# Patient Record
Sex: MALE | Race: BLACK OR AFRICAN AMERICAN | Employment: OTHER | URBAN - NONMETROPOLITAN AREA
[De-identification: names, ages, dates, MRNs, and addresses within clinical notes are randomized per-mention and may not be internally consistent; named-entity substitution may affect disease eponyms.]

---

## 2017-01-16 ENCOUNTER — OFFICE VISIT (OUTPATIENT)
Age: 52
End: 2017-01-16

## 2017-01-16 VITALS
SYSTOLIC BLOOD PRESSURE: 130 MMHG | TEMPERATURE: 97.3 F | BODY MASS INDEX: 38.36 KG/M2 | HEART RATE: 90 BPM | WEIGHT: 315 LBS | RESPIRATION RATE: 16 BRPM | HEIGHT: 76 IN | DIASTOLIC BLOOD PRESSURE: 70 MMHG | OXYGEN SATURATION: 99 %

## 2017-01-16 DIAGNOSIS — L02.419 AXILLARY ABSCESS: Primary | ICD-10-CM

## 2017-01-16 PROCEDURE — 99203 OFFICE O/P NEW LOW 30 MIN: CPT | Performed by: SURGERY

## 2017-01-16 RX ORDER — DOXYCYCLINE HYCLATE 100 MG/1
100 CAPSULE ORAL 2 TIMES DAILY
COMMUNITY
End: 2017-03-08 | Stop reason: ALTCHOICE

## 2017-01-16 RX ORDER — SULFAMETHOXAZOLE AND TRIMETHOPRIM 800; 160 MG/1; MG/1
1 TABLET ORAL 2 TIMES DAILY
Qty: 10 TABLET | Refills: 0 | Status: SHIPPED | OUTPATIENT
Start: 2017-01-16 | End: 2017-01-21

## 2017-01-16 ASSESSMENT — ENCOUNTER SYMPTOMS
DIARRHEA: 0
COUGH: 0
BACK PAIN: 0
EYE PAIN: 0
CONSTIPATION: 0
SHORTNESS OF BREATH: 0
VOMITING: 0
PHOTOPHOBIA: 0
SORE THROAT: 0
TROUBLE SWALLOWING: 0
RHINORRHEA: 0
STRIDOR: 0
CHEST TIGHTNESS: 0
VOICE CHANGE: 0
NAUSEA: 0
CHOKING: 0
ROS SKIN COMMENTS: LEFT AXILLA
RECTAL PAIN: 0
ABDOMINAL PAIN: 0
BLOOD IN STOOL: 0
EYE ITCHING: 0
COLOR CHANGE: 0
EYE REDNESS: 0
EYE DISCHARGE: 0
FACIAL SWELLING: 0
SINUS PRESSURE: 0
ANAL BLEEDING: 0
WHEEZING: 0
APNEA: 0
ABDOMINAL DISTENTION: 0

## 2017-01-30 ENCOUNTER — OFFICE VISIT (OUTPATIENT)
Dept: CARDIOLOGY | Age: 52
End: 2017-01-30

## 2017-01-30 VITALS
HEIGHT: 76 IN | SYSTOLIC BLOOD PRESSURE: 144 MMHG | WEIGHT: 315 LBS | BODY MASS INDEX: 38.36 KG/M2 | HEART RATE: 76 BPM | DIASTOLIC BLOOD PRESSURE: 84 MMHG

## 2017-01-30 DIAGNOSIS — I25.10 CORONARY ARTERY DISEASE INVOLVING NATIVE CORONARY ARTERY OF NATIVE HEART WITHOUT ANGINA PECTORIS: ICD-10-CM

## 2017-01-30 DIAGNOSIS — R06.09 DYSPNEA ON EXERTION: ICD-10-CM

## 2017-01-30 DIAGNOSIS — E78.01 FAMILIAL HYPERCHOLESTEROLEMIA: ICD-10-CM

## 2017-01-30 DIAGNOSIS — I10 ESSENTIAL HYPERTENSION: Primary | ICD-10-CM

## 2017-01-30 DIAGNOSIS — E66.01 MORBID OBESITY DUE TO EXCESS CALORIES (HCC): ICD-10-CM

## 2017-01-30 DIAGNOSIS — I42.9 CARDIOMYOPATHY (HCC): ICD-10-CM

## 2017-01-30 PROCEDURE — 99213 OFFICE O/P EST LOW 20 MIN: CPT | Performed by: NUCLEAR MEDICINE

## 2017-01-30 RX ORDER — TRAMADOL HYDROCHLORIDE 50 MG/1
50 TABLET ORAL EVERY 8 HOURS PRN
COMMUNITY
End: 2017-03-08 | Stop reason: ALTCHOICE

## 2017-01-30 RX ORDER — LISINOPRIL 10 MG/1
10 TABLET ORAL DAILY
Qty: 30 TABLET | Refills: 11 | Status: ON HOLD | OUTPATIENT
Start: 2017-01-30 | End: 2020-11-28 | Stop reason: HOSPADM

## 2017-02-01 ENCOUNTER — TELEPHONE (OUTPATIENT)
Dept: CARDIOLOGY | Age: 52
End: 2017-02-01

## 2017-03-08 ENCOUNTER — OFFICE VISIT (OUTPATIENT)
Dept: PULMONOLOGY | Age: 52
End: 2017-03-08

## 2017-03-08 VITALS
SYSTOLIC BLOOD PRESSURE: 124 MMHG | HEIGHT: 76 IN | HEART RATE: 75 BPM | WEIGHT: 315 LBS | OXYGEN SATURATION: 95 % | DIASTOLIC BLOOD PRESSURE: 72 MMHG | BODY MASS INDEX: 38.36 KG/M2

## 2017-03-08 DIAGNOSIS — G47.33 OBSTRUCTIVE SLEEP APNEA: Primary | ICD-10-CM

## 2017-03-08 PROCEDURE — 99213 OFFICE O/P EST LOW 20 MIN: CPT | Performed by: PHYSICIAN ASSISTANT

## 2017-03-08 RX ORDER — IBUPROFEN 800 MG/1
800 TABLET ORAL EVERY 6 HOURS PRN
COMMUNITY
End: 2017-03-15

## 2017-03-09 ENCOUNTER — OFFICE VISIT (OUTPATIENT)
Dept: PHYSICAL MEDICINE AND REHAB | Age: 52
End: 2017-03-09

## 2017-03-09 VITALS
WEIGHT: 315 LBS | HEIGHT: 76 IN | BODY MASS INDEX: 38.36 KG/M2 | HEART RATE: 86 BPM | SYSTOLIC BLOOD PRESSURE: 117 MMHG | DIASTOLIC BLOOD PRESSURE: 76 MMHG

## 2017-03-09 DIAGNOSIS — G89.4 CHRONIC PAIN SYNDROME: ICD-10-CM

## 2017-03-09 DIAGNOSIS — M47.816 SPONDYLOSIS OF LUMBAR REGION WITHOUT MYELOPATHY OR RADICULOPATHY: Primary | ICD-10-CM

## 2017-03-09 PROCEDURE — 99205 OFFICE O/P NEW HI 60 MIN: CPT | Performed by: PAIN MEDICINE

## 2017-03-09 ASSESSMENT — ENCOUNTER SYMPTOMS
CONSTIPATION: 0
BACK PAIN: 1
SORE THROAT: 0
EYE PAIN: 0
PHOTOPHOBIA: 0
ABDOMINAL PAIN: 0
DIARRHEA: 0
SHORTNESS OF BREATH: 0
RHINORRHEA: 0
CHEST TIGHTNESS: 0
BOWEL INCONTINENCE: 0
SINUS PRESSURE: 0
VOMITING: 0
COLOR CHANGE: 0
COUGH: 0
NAUSEA: 0
WHEEZING: 0

## 2017-08-17 ENCOUNTER — APPOINTMENT (OUTPATIENT)
Dept: ULTRASOUND IMAGING | Age: 52
End: 2017-08-17
Payer: MEDICARE

## 2017-08-17 ENCOUNTER — APPOINTMENT (OUTPATIENT)
Dept: GENERAL RADIOLOGY | Age: 52
End: 2017-08-17
Payer: MEDICARE

## 2017-08-17 ENCOUNTER — HOSPITAL ENCOUNTER (EMERGENCY)
Age: 52
Discharge: HOME | End: 2017-08-17
Attending: FAMILY MEDICINE
Payer: MEDICARE

## 2017-08-17 VITALS
TEMPERATURE: 97.6 F | WEIGHT: 315 LBS | HEIGHT: 76 IN | OXYGEN SATURATION: 96 % | RESPIRATION RATE: 16 BRPM | DIASTOLIC BLOOD PRESSURE: 67 MMHG | HEART RATE: 69 BPM | BODY MASS INDEX: 38.36 KG/M2 | SYSTOLIC BLOOD PRESSURE: 114 MMHG

## 2017-08-17 DIAGNOSIS — M17.11 PRIMARY OSTEOARTHRITIS OF RIGHT KNEE: Primary | ICD-10-CM

## 2017-08-17 DIAGNOSIS — S76.311A HAMSTRING STRAIN, RIGHT, INITIAL ENCOUNTER: ICD-10-CM

## 2017-08-17 PROCEDURE — 99284 EMERGENCY DEPT VISIT MOD MDM: CPT

## 2017-08-17 PROCEDURE — 76882 US LMTD JT/FCL EVL NVASC XTR: CPT

## 2017-08-17 PROCEDURE — 73564 X-RAY EXAM KNEE 4 OR MORE: CPT

## 2017-08-17 RX ORDER — IBUPROFEN 800 MG/1
400 TABLET ORAL EVERY 6 HOURS PRN
Qty: 40 TABLET | Refills: 0 | Status: SHIPPED | OUTPATIENT
Start: 2017-08-17 | End: 2020-10-13 | Stop reason: ALTCHOICE

## 2017-08-17 ASSESSMENT — ENCOUNTER SYMPTOMS
VOMITING: 0
RHINORRHEA: 0
PHOTOPHOBIA: 0
STRIDOR: 0
SHORTNESS OF BREATH: 0
BACK PAIN: 0
EYE DISCHARGE: 0
ABDOMINAL DISTENTION: 0
DIARRHEA: 0
COLOR CHANGE: 0
NAUSEA: 0
COUGH: 0
FACIAL SWELLING: 0
EYE REDNESS: 0

## 2017-08-17 ASSESSMENT — PAIN SCALES - GENERAL: PAINLEVEL_OUTOF10: 8

## 2017-08-17 ASSESSMENT — PAIN DESCRIPTION - ORIENTATION: ORIENTATION: RIGHT;LOWER

## 2017-08-17 ASSESSMENT — PAIN DESCRIPTION - LOCATION: LOCATION: KNEE;OTHER (COMMENT)

## 2017-08-17 ASSESSMENT — PAIN DESCRIPTION - PAIN TYPE: TYPE: ACUTE PAIN

## 2017-09-08 ENCOUNTER — HOSPITAL ENCOUNTER (OUTPATIENT)
Age: 52
Discharge: HOME | End: 2017-09-08
Payer: MEDICARE

## 2017-09-08 ENCOUNTER — OFFICE VISIT (OUTPATIENT)
Dept: CARDIOLOGY CLINIC | Age: 52
End: 2017-09-08
Payer: MEDICARE

## 2017-09-08 VITALS
DIASTOLIC BLOOD PRESSURE: 64 MMHG | BODY MASS INDEX: 38.36 KG/M2 | HEART RATE: 82 BPM | WEIGHT: 315 LBS | OXYGEN SATURATION: 97 % | SYSTOLIC BLOOD PRESSURE: 100 MMHG | HEIGHT: 76 IN

## 2017-09-08 DIAGNOSIS — I50.9 CHRONIC CONGESTIVE HEART FAILURE, UNSPECIFIED CONGESTIVE HEART FAILURE TYPE: Primary | ICD-10-CM

## 2017-09-08 DIAGNOSIS — I50.9 CHRONIC CONGESTIVE HEART FAILURE, UNSPECIFIED CONGESTIVE HEART FAILURE TYPE: ICD-10-CM

## 2017-09-08 LAB
ANION GAP SERPL CALCULATED.3IONS-SCNC: 10 MEQ/L (ref 8–16)
BUN BLDV-MCNC: 14 MG/DL (ref 7–22)
CALCIUM SERPL-MCNC: 8.9 MG/DL (ref 8.5–10.5)
CHLORIDE BLD-SCNC: 104 MEQ/L (ref 98–111)
CO2: 28 MEQ/L (ref 23–33)
CREAT SERPL-MCNC: 0.9 MG/DL (ref 0.4–1.2)
GFR SERPL CREATININE-BSD FRML MDRD: > 90 ML/MIN/1.73M2
GLUCOSE BLD-MCNC: 90 MG/DL (ref 70–108)
POTASSIUM SERPL-SCNC: 4.3 MEQ/L (ref 3.5–5.2)
SODIUM BLD-SCNC: 142 MEQ/L (ref 135–145)

## 2017-09-08 PROCEDURE — 99213 OFFICE O/P EST LOW 20 MIN: CPT | Performed by: NURSE PRACTITIONER

## 2017-09-08 PROCEDURE — 36415 COLL VENOUS BLD VENIPUNCTURE: CPT

## 2017-09-08 PROCEDURE — 80048 BASIC METABOLIC PNL TOTAL CA: CPT

## 2017-09-08 RX ORDER — METFORMIN HYDROCHLORIDE 500 MG/1
500 TABLET, EXTENDED RELEASE ORAL
COMMUNITY

## 2017-09-08 ASSESSMENT — ENCOUNTER SYMPTOMS
WHEEZING: 0
CHEST TIGHTNESS: 0
NAUSEA: 0
ABDOMINAL PAIN: 0
COLOR CHANGE: 0
APNEA: 1
COUGH: 0
ABDOMINAL DISTENTION: 1
SHORTNESS OF BREATH: 1

## 2017-09-11 ENCOUNTER — TELEPHONE (OUTPATIENT)
Dept: CARDIOLOGY CLINIC | Age: 52
End: 2017-09-11

## 2017-12-08 ENCOUNTER — OFFICE VISIT (OUTPATIENT)
Dept: CARDIOLOGY CLINIC | Age: 52
End: 2017-12-08
Payer: MEDICARE

## 2017-12-08 VITALS
WEIGHT: 315 LBS | OXYGEN SATURATION: 96 % | SYSTOLIC BLOOD PRESSURE: 121 MMHG | RESPIRATION RATE: 18 BRPM | BODY MASS INDEX: 38.36 KG/M2 | DIASTOLIC BLOOD PRESSURE: 82 MMHG | HEIGHT: 76 IN | HEART RATE: 72 BPM

## 2017-12-08 DIAGNOSIS — I50.32 CHF (CONGESTIVE HEART FAILURE), NYHA CLASS II, CHRONIC, DIASTOLIC (HCC): Primary | ICD-10-CM

## 2017-12-08 PROCEDURE — 99213 OFFICE O/P EST LOW 20 MIN: CPT | Performed by: NURSE PRACTITIONER

## 2017-12-08 RX ORDER — GLUCOSAMINE HCL 500 MG
1000 TABLET ORAL
COMMUNITY
End: 2018-12-20 | Stop reason: ALTCHOICE

## 2017-12-08 ASSESSMENT — ENCOUNTER SYMPTOMS
CHEST TIGHTNESS: 0
SHORTNESS OF BREATH: 0
COLOR CHANGE: 0
WHEEZING: 0
ABDOMINAL PAIN: 0
NAUSEA: 0
APNEA: 0
ABDOMINAL DISTENTION: 0
COUGH: 0

## 2017-12-08 NOTE — PROGRESS NOTES
Heart Failure Clinic       Visit Date: 12/8/2017  Cardiologist:  Dr. Shadi Hernandez   Primary Care Physician: Dr. Shahida Edward, CNP    Mirza Ibrahim is a 46 y.o. male who presents today for:  Chief Complaint   Patient presents with    Congestive Heart Failure     3 mo f/u      HPI:   Mirza Ibrahim is a 46 y.o. male who presents to the office for a follow up in the heart failure clinic. Jessica Hutchison is compliant with his medications. He drinks a lot of fluid a day, he stated around 5 liters. He has been trying to eat healthier and has lost 9 pounds since September. He does not add salt to his food however he continues to eat processed foods like hot dogs (4 at a time), bologna, Doritos. He has incorporated a vegetable salad but tops it with blue cheese dressing. We looked up how much sodium is in a hot dog and was found to be 810 mg without a bun. He has not been looking at the food labels. He has KRISTEN and is compliant with his CPAP. He sleep in bed with 1 pillow. He has been taking extra Bumex on the days he drinks more fluid either one or two extra 0.5 mg in the evening. He fatigues and gets SOB with one flight of stairs and walking short distances. He is able to perform ADLs without difficulty.      Patient has:  Last hospital admission related to Heart Failure:  None in the past year  Chest Pain: no  Worsening SOB/orthopnea/PND: no  Edema: sometimes  Any extra diuretic use since last visit: yes  Weight gain: unknown  Compliant checking home weight: no  Fatigue: sometimes  Abdominal bloating: yes  Appetite: good  Difficulty sleeping: KRISTEN compliant with CPAP  Cough: no  Compliant checking blood pressure: no  Any refills on CHF medications needed at this time: no    Past Medical History:   Diagnosis Date    Anxiety     Arrhythmia     Arthritis     CAD (coronary artery disease)     Chest pain     HX OF:    [facility]     Chronic back pain     [facility]     Depression     Diverticula of colon     Fatigue     Heart attack     Hyperlipidemia     Hypertension     Panic attacks     Pneumonia     Sleep apnea     SOB (shortness of breath)     Spondylosis     sponylolisthesis L5    Swelling      Past Surgical History:   Procedure Laterality Date    CARDIAC CATHETERIZATION  01/30/2008    EF 20%    CARDIOVASCULAR STRESS TEST  02/16/2009    EF 52%    PRE-MALIGNANT / BENIGN SKIN LESION EXCISION  5/17/12    mole on abd-left arm-Dr. Cletis Epley    TRANSTHORACIC ECHOCARDIOGRAM  07/22/2011    EF 55-65%     Family History   Problem Relation Age of Onset    High Cholesterol Father     High Blood Pressure Father     Coronary Art Dis Father     Cancer Maternal Grandmother      colon     Social History   Substance Use Topics    Smoking status: Passive Smoke Exposure - Never Smoker    Smokeless tobacco: Never Used    Alcohol use No      Comment: quit drinking      Current Outpatient Prescriptions   Medication Sig Dispense Refill    Cholecalciferol (VITAMIN D3) 3000 units TABS Take 1,000 Units by mouth      Multiple Vitamins-Minerals (MULTIVITAMIN ADULT PO) Take 1 tablet by mouth Daily      metFORMIN (GLUCOPHAGE-XR) 500 MG extended release tablet Take 500 mg by mouth daily (with breakfast)      ibuprofen (ADVIL;MOTRIN) 800 MG tablet Take 0.5 tablets by mouth every 6 hours as needed for Pain 40 tablet 0    lisinopril (PRINIVIL;ZESTRIL) 10 MG tablet Take 1 tablet by mouth daily 30 tablet 11    clonazePAM (KLONOPIN) 0.5 MG tablet Take 0.5 mg by mouth 2 times daily as needed      allopurinol (ZYLOPRIM) 300 MG tablet Take 1 tablet by mouth daily 30 tablet 5    atorvastatin (LIPITOR) 20 MG tablet TAKE 1 TABLET BY MOUTH ONE TIME A DAY 30 tablet 5    bumetanide (BUMEX) 0.5 MG tablet Take 2 tablets daily. If weight gain 3Ibs in 1 day or 5Ibs in 1 week, take 2 tablets in AM and PM as needed.  (Patient taking differently: Take 2 tablets in the am. One tablet in pm. If if he has a weight gain of 2-3 pounds in 24 hours or 5 pounds in a week  · Limit sodium in diet to around 1500 mg/day  · Monitor BP  · Activity as tolerated     Patient was instructed to call the [address] for changes in the following symptoms:   Weight gain of 2-3 pounds in 1 day or 5 pounds in 1 week  Increased shortness of breath  Shortness of breath while laying down  Cough  Chest pain  Swelling in feet, ankles or legs  Tenderness or bloating in the abdomen  Fatigue   Decreased appetite or nausea   Confusion      Return in about 3 months (around 3/8/2018). or sooner if needed   Follow up with Dr Raf Fitzpatrick at previously scheduled appt and get BMP a few days prior     Patient given educational materials - see patient instructions. We discussed the importance of weighing oneself and recording daily. We also discussed the importance of a low sodium diet, higher sodium foods to avoid and better low sodium food options. Patient verbalizes understanding of plan of care using teach back method, and is agreeable to the treatment plan.        Electronically signed by Dionne Russell CNP on 12/8/2017 at 10:21 AM

## 2017-12-08 NOTE — PATIENT INSTRUCTIONS
Continue:  · Continue current medications  · Daily weights and record  · Fluid restriction of 2 Liters per day  · Limit sodium in diet to around 1500 mg/day  · Monitor BP  · Activity as tolerated     Call the Heart Failure Clinic for any of the following symptoms: [phone]  Weight gain of 2-3 pounds in 1 day or 5 pounds in 1 week  Increased shortness of breath  Shortness of breath while laying down  Cough  Chest pain  Swelling in feet, ankles or legs  Tenderness or bloating in the abdomen  Fatigue   Decreased appetite or nausea   Confusion

## 2018-01-29 ENCOUNTER — HOSPITAL ENCOUNTER (OUTPATIENT)
Age: 53
Discharge: HOME | End: 2018-01-29
Payer: MEDICARE

## 2018-01-29 DIAGNOSIS — I50.32 CHF (CONGESTIVE HEART FAILURE), NYHA CLASS II, CHRONIC, DIASTOLIC (HCC): ICD-10-CM

## 2018-01-29 LAB
ANION GAP SERPL CALCULATED.3IONS-SCNC: 12 MEQ/L (ref 8–16)
BUN BLDV-MCNC: 20 MG/DL (ref 7–22)
CALCIUM SERPL-MCNC: 9.2 MG/DL (ref 8.5–10.5)
CHLORIDE BLD-SCNC: 102 MEQ/L (ref 98–111)
CO2: 28 MEQ/L (ref 23–33)
CREAT SERPL-MCNC: 1 MG/DL (ref 0.4–1.2)
GFR SERPL CREATININE-BSD FRML MDRD: > 90 ML/MIN/1.73M2
GLUCOSE BLD-MCNC: 99 MG/DL (ref 70–108)
POTASSIUM SERPL-SCNC: 4.7 MEQ/L (ref 3.5–5.2)
SODIUM BLD-SCNC: 142 MEQ/L (ref 135–145)

## 2018-01-29 PROCEDURE — 36415 COLL VENOUS BLD VENIPUNCTURE: CPT

## 2018-01-29 PROCEDURE — 80048 BASIC METABOLIC PNL TOTAL CA: CPT

## 2018-02-05 ENCOUNTER — OFFICE VISIT (OUTPATIENT)
Dept: CARDIOLOGY CLINIC | Age: 53
End: 2018-02-05
Payer: MEDICARE

## 2018-02-05 VITALS
HEART RATE: 84 BPM | WEIGHT: 315 LBS | SYSTOLIC BLOOD PRESSURE: 132 MMHG | BODY MASS INDEX: 38.36 KG/M2 | DIASTOLIC BLOOD PRESSURE: 62 MMHG | HEIGHT: 76 IN

## 2018-02-05 DIAGNOSIS — I10 ESSENTIAL HYPERTENSION: ICD-10-CM

## 2018-02-05 DIAGNOSIS — G47.33 OBSTRUCTIVE SLEEP APNEA SYNDROME: ICD-10-CM

## 2018-02-05 DIAGNOSIS — I50.42 CHRONIC COMBINED SYSTOLIC AND DIASTOLIC CONGESTIVE HEART FAILURE (HCC): Primary | ICD-10-CM

## 2018-02-05 PROCEDURE — 99213 OFFICE O/P EST LOW 20 MIN: CPT | Performed by: NUCLEAR MEDICINE

## 2018-02-05 PROCEDURE — 93000 ELECTROCARDIOGRAM COMPLETE: CPT | Performed by: NUCLEAR MEDICINE

## 2018-02-05 NOTE — PROGRESS NOTES
Patient here for 1 yr follow up. Patient complains of frequent dizziness. Patient denies sob, chest pain, swelling and palpitations.

## 2018-02-05 NOTE — PROGRESS NOTES
metFORMIN (GLUCOPHAGE-XR) 500 MG extended release tablet Take 500 mg by mouth daily (with breakfast)      ibuprofen (ADVIL;MOTRIN) 800 MG tablet Take 0.5 tablets by mouth every 6 hours as needed for Pain 40 tablet 0    lisinopril (PRINIVIL;ZESTRIL) 10 MG tablet Take 1 tablet by mouth daily 30 tablet 11    lidocaine (XYLOCAINE) 5 % ointment Apply topically as needed. 1 Tube 0    clonazePAM (KLONOPIN) 0.5 MG tablet Take 0.5 mg by mouth 2 times daily as needed      allopurinol (ZYLOPRIM) 300 MG tablet Take 1 tablet by mouth daily 30 tablet 5    atorvastatin (LIPITOR) 20 MG tablet TAKE 1 TABLET BY MOUTH ONE TIME A DAY 30 tablet 5    bumetanide (BUMEX) 0.5 MG tablet Take 2 tablets daily. If weight gain 3Ibs in 1 day or 5Ibs in 1 week, take 2 tablets in AM and PM as needed. (Patient taking differently: Take 2 tablets in the am. One tablet in pm. If weight gain 2-3Ibs in 1 day or 5Ibs in 1 week, take an extra tablet at pm) 60 tablet 5    carvedilol (COREG) 25 MG tablet Take 1 tablet by mouth 2 times daily 60 tablet 5    ranitidine (ZANTAC) 150 MG tablet TAKE ONE TABLET BY MOUTH TWICE DAILY 60 tablet 5    spironolactone (ALDACTONE) 25 MG tablet Take 0.5 tablets by mouth daily 15 tablet 5    meclizine (ANTIVERT) 25 MG tablet Take 1 tablet by mouth 3 times daily as needed 60 tablet 5    albuterol-ipratropium (COMBIVENT RESPIMAT)  MCG/ACT AERS inhaler Inhale 1 puff into the lungs every 6 hours as needed for Wheezing 1 Inhaler 5    carBAMazepine (TEGRETOL) 200 MG tablet Take 200 mg by mouth 2 times daily      Blood Pressure KIT This is a prescription for a blood pressure cuff for at home use. 1 kit 0    risperiDONE (RISPERDAL) 0.5 MG tablet Take 0.5 mg by mouth 2 times daily.  citalopram (CELEXA) 20 MG tablet Take 1 tablet by mouth daily. 30 tablet 5    aspirin EC 81 MG EC tablet Take 81 mg by mouth daily. With food; blood thinner       No current facility-administered medications for this visit. Allergies   Allergen Reactions    Mobic [Meloxicam]      Health Maintenance   Topic Date Due    Hepatitis C screen  1965    HIV screen  01/23/1980    DTaP/Tdap/Td vaccine (1 - Tdap) 05/14/2013    Flu vaccine (1) 09/01/2017    Potassium monitoring  01/29/2019    Creatinine monitoring  01/29/2019    Colon cancer screen colonoscopy  10/19/2021    Lipid screen  03/15/2022    Pneumococcal med risk  Completed       Subjective:  Review of Systems  General:   No fever, no chills, No fatigue or weight loss  Pulmonary:    some dyspnea, no wheezing  Cardiac:    Denies recent chest pain,   GI:     No nausea or vomiting, no abdominal pain  Neuro:     No dizziness or light headedness,   Musculoskeletal:  No recent active issues  Extremities:   No edema, good peripheral pulses      Objective:  Physical Exam  /62   Pulse 84   Ht 6' 4\" (1.93 m)   Wt (!) 343 lb (155.6 kg)   BMI 41.75 kg/m²   General:   Well developed, well nourished  Lungs:    Clear to auscultation  Heart:    Normal S1 S2, Slight murmur. no rubs, no gallops  Abdomen:   Soft, non tender, no organomegalies, positive bowel sounds  Extremities:   mild edema, no cyanosis, good peripheral pulses  Neurological:   Awake, alert, oriented. No obvious focal deficits  Musculoskelatal:  No obvious deformities    Assessment:     1. Chronic combined systolic and diastolic congestive heart failure (HCC)  EKG 12 Lead   2. Essential hypertension     3. Obstructive sleep apnea syndrome     cardiac fair for now   ECG in office was done today. I reviewed the ECG. No acute findings      Plan:  No Follow-up on file. As above  Continue risk factor modification and medical management  Thank you for allowing me to participate in the care of your patient.  Please don't hesitate to contact me regarding any further issues related to the patient care    Orders Placed:  Orders Placed This Encounter   Procedures    EKG 12 Lead     Order Specific Question:   Reason for

## 2018-03-08 ENCOUNTER — TELEPHONE (OUTPATIENT)
Dept: CARDIOLOGY CLINIC | Age: 53
End: 2018-03-08

## 2018-03-08 ENCOUNTER — OFFICE VISIT (OUTPATIENT)
Dept: PULMONOLOGY | Age: 53
End: 2018-03-08
Payer: MEDICARE

## 2018-03-08 VITALS
SYSTOLIC BLOOD PRESSURE: 122 MMHG | BODY MASS INDEX: 38.36 KG/M2 | WEIGHT: 315 LBS | OXYGEN SATURATION: 95 % | HEIGHT: 76 IN | HEART RATE: 86 BPM | DIASTOLIC BLOOD PRESSURE: 86 MMHG

## 2018-03-08 DIAGNOSIS — Z99.89 OSA ON CPAP: Primary | ICD-10-CM

## 2018-03-08 DIAGNOSIS — J20.9 ACUTE BRONCHITIS, UNSPECIFIED ORGANISM: ICD-10-CM

## 2018-03-08 DIAGNOSIS — G47.33 OSA ON CPAP: Primary | ICD-10-CM

## 2018-03-08 PROCEDURE — 99214 OFFICE O/P EST MOD 30 MIN: CPT | Performed by: PHYSICIAN ASSISTANT

## 2018-03-08 ASSESSMENT — ENCOUNTER SYMPTOMS
HEARTBURN: 0
SPUTUM PRODUCTION: 0
GASTROINTESTINAL NEGATIVE: 1
NAUSEA: 0
WHEEZING: 0
EYES NEGATIVE: 1
ORTHOPNEA: 0
SHORTNESS OF BREATH: 0
SINUS PAIN: 0
SORE THROAT: 0
COUGH: 1

## 2018-03-08 NOTE — PROGRESS NOTES
[facility]      Fer Santiago                                         [id number]  3/8/2018   Chief Complaint   Patient presents with    Sleep Apnea     KRISTEN 1 yr f/u [facility], states he is still tired, thinks its because hes disabled and doesnt do much hopefully when weather gets better he will be able to do more        Pt of Dr. Chastity Medina    PAP Download:   Original or initial  AHI: 99.4     Date of initial study: 5/9/16    [x] Compliant  100%   []  Noncompliant 0 %     PAP Type CPAP   Level  10   Avg Hrs/Day 9:50  AHI: 0.9   Recorded compliance dates , February 5, 2018  to March 6, 2018   Machine/Mfg: ResMed Interface:  Nasal pillows    Provider:  [x]-HME  []Evgeny  []Wilver  []Markare         [][facility] []Other:   Neck Size: 19.75  Mallampati Mallampati 4  ESS:  16    Here is a scan of the most recent download:        Presentation:   Christiano Lunsford presents for sleep medicine follow up for obstructive sleep apnea  Since the last visit, Christiano Lunsford is doing reasonably well with their sleep machine. Other comments: He is doing well with PAP. Equipment issues: The pressure is  acceptable, the mask is acceptable and he  is  using the humidity. Sleep issues:  Do you feel better? Yes  More rested? Yes   Better concentration? yes    Progress History:   Since last visit any new medical issues? No  New ER or hospitlal visits? No  Any new or changes in medicines? No  Any new sleep medicines?  No        Past Medical History:   Diagnosis Date    Anxiety     Arrhythmia     Arthritis     CAD (coronary artery disease)     Chest pain     HX OF:    CHF (congestive heart failure) (HCC)     Chronic back pain     COPD (chronic obstructive pulmonary disease) (HCC)     Depression     Diverticula of colon     Fatigue     Heart attack     Hyperlipidemia     Hypertension     Panic attacks     Pneumonia     Sleep apnea     SOB (shortness of breath)     Spondylosis     sponylolisthesis L5    (!) 343 lb (155.6 kg)   12/08/17 (!) 341 lb (154.7 kg)     Vitals: /86 (Site: Left Arm, Position: Sitting)   Pulse 86   Ht 6' 4\" (1.93 m)   Wt (!) 351 lb 3.2 oz (159.3 kg)   SpO2 95%   BMI 42.75 kg/m²       Physical Exam   Constitutional: He is oriented to person, place, and time and well-developed, well-nourished, and in no distress. HENT:   Head: Normocephalic and atraumatic. Mouth/Throat: No oropharyngeal exudate. Eyes: Conjunctivae and EOM are normal. Pupils are equal, round, and reactive to light. Neck: Normal range of motion. Neck supple. Cardiovascular: Normal rate, regular rhythm and normal heart sounds. Exam reveals no friction rub. No murmur heard. Pulmonary/Chest: Effort normal and breath sounds normal. No respiratory distress. He has no wheezes. He has no rales. Abdominal: Bowel sounds are normal. He exhibits no distension. There is no tenderness. Musculoskeletal: Normal range of motion. He exhibits no edema or tenderness. Neurological: He is alert and oriented to person, place, and time. Skin: Skin is warm and dry. No rash noted. Psychiatric: Affect and judgment normal.   Nursing note and vitals reviewed. ASSESSMENT/DIAGNOSIS    1. KRISTEN on CPAP     2. Acute bronchitis, unspecified organism              Plan   Do you need any equipment today? Yes update supplies  - He was treated with steroids for bronchitis and no longer wheezing  - He  was advised to continue current positive airway pressure therapy with above described pressure. - He  advised to keep good compliance with current recommended pressure to get optimal results and clinical improvement  - Recommend 7-9 hours of sleep with PAP  - He was advised to call [employer] regarding supplies if needed. - He call my office for earlier appointment if needed for worsening of sleep symptoms.   - He was instructed on weight loss  - Juliette Hwang was educated about my impression and plan.  Patient verbalizes

## 2018-03-15 ENCOUNTER — OFFICE VISIT (OUTPATIENT)
Dept: CARDIOLOGY CLINIC | Age: 53
End: 2018-03-15
Payer: MEDICARE

## 2018-03-15 VITALS
WEIGHT: 315 LBS | BODY MASS INDEX: 38.36 KG/M2 | SYSTOLIC BLOOD PRESSURE: 118 MMHG | HEART RATE: 76 BPM | DIASTOLIC BLOOD PRESSURE: 86 MMHG | OXYGEN SATURATION: 97 % | HEIGHT: 76 IN

## 2018-03-15 DIAGNOSIS — I50.32 CHF (CONGESTIVE HEART FAILURE), NYHA CLASS II, CHRONIC, DIASTOLIC (HCC): Primary | ICD-10-CM

## 2018-03-15 PROCEDURE — 99213 OFFICE O/P EST LOW 20 MIN: CPT | Performed by: NURSE PRACTITIONER

## 2018-03-15 ASSESSMENT — ENCOUNTER SYMPTOMS
APNEA: 0
ABDOMINAL PAIN: 0
WHEEZING: 0
SHORTNESS OF BREATH: 1
CHEST TIGHTNESS: 0
NAUSEA: 0
ABDOMINAL DISTENTION: 0
COLOR CHANGE: 0
COUGH: 0

## 2018-03-15 NOTE — PROGRESS NOTES
Heart Failure Clinic       Visit Date: 3/15/2018  Cardiologist:  Dr. Candelario Montoya  Primary Care Physician: Dr. Angela Jane, CNP    Evertet Thompson is a 48 y.o. male who presents today for:  Chief Complaint   Patient presents with    Congestive Heart Failure       HPI:   Everett Thompson is a 48 y.o. male who presents to the office for a follow up in the heart failure clinic. He is compliant with his medications. He continues to eat processed foods like bologna and hot dogs. He drinks 4 or 5 liters of fluid a day. He will take an \"extra\" Bumex if he fells like he drank more and does not urinate like he should. He is compliant with his CPAP. He sleeps in bed with 1 pillow. He can perform ADLs without SOB or fatigue. He does get SOB using the stairs.      Patient has:  Last hospital admission related to Heart Failure:  > 1 year   Chest Pain: no  Worsening SOB/orthopnea/PND: no  Edema: no  Any extra diuretic use since last visit: elian see hpi  Weight gain: yes - he attributes to recent Prednisone use for an URI  Compliant checking home weight: yes  Fatigue: no  Abdominal bloating: no  Appetite: good  Difficulty sleeping: KRISTEN compliant with CPAP  Cough: no  Compliant checking blood pressure: no  Any refills on CHF medications needed at this time: no    Past Medical History:   Diagnosis Date    Anxiety     Arrhythmia     Arthritis     CAD (coronary artery disease)     Chest pain     HX OF:    CHF (congestive heart failure) (HCC)     Chronic back pain     COPD (chronic obstructive pulmonary disease) (HCC)     Depression     Diverticula of colon     Fatigue     Heart attack     Hyperlipidemia     Hypertension     Panic attacks     Pneumonia     Sleep apnea     SOB (shortness of breath)     Spondylosis     sponylolisthesis L5    Swelling      Past Surgical History:   Procedure Laterality Date    CARDIAC CATHETERIZATION  01/30/2008    EF 20%    CARDIOVASCULAR STRESS TEST  02/16/2009    EF 52%    PRE-MALIGNANT / HGB 14.4 03/28/2016    HCT 45.7 03/28/2016     03/28/2016     CMP:    Lab Results   Component Value Date     01/29/2018    K 4.7 01/29/2018     01/29/2018    CO2 28 01/29/2018    BUN 20 01/29/2018    CREATININE 1.0 01/29/2018    LABGLOM >90 01/29/2018    GLUCOSE 99 01/29/2018    GLUCOSE 107 05/16/2012    CALCIUM 9.2 01/29/2018     Hepatic Function Panel:    Lab Results   Component Value Date    ALKPHOS 95 03/15/2017    ALT 32 03/15/2017    AST 20 03/15/2017    PROT 7.6 03/15/2017    BILITOT 0.7 03/15/2017    BILIDIR <0.2 03/15/2017    LABALBU 4.0 03/15/2017    LABALBU 4.4 09/14/2011     Magnesium:    Lab Results   Component Value Date    MG 1.8 06/27/2016     PT/INR:    Lab Results   Component Value Date    INR 0.98 04/21/2014     Lipids:    Lab Results   Component Value Date    TRIG 104 03/15/2017    HDL 37 03/15/2017    [address] 90 03/15/2017       ASSESSMENT AND PLAN:   The patient's condition/symptoms are Stable: No clinical evidence of fluid overload today.  Continue current medical regimen without changes at present time.     1. CHF (congestive heart failure), NYHA class II, chronic, diastolic (HCC)       He states he is going to make a conscious effort to lose weight and eat healthier     Continue:  · Continue current medications - Lisinopril, Bumex, Coreg, Aldactone  · Daily weights  · Fluid restriction of 2 Liters per day -- he was again advised not to take extra Bumex on the days he drinks more fluid   · Limit sodium in diet to around 5868-7769 mg/day  · Monitor BP  · Activity as tolerated     Patient was instructed to call the [facility] for changes in the following symptoms:   Weight gain of 2-3 pounds in 1 day or 5 pounds in 1 week  Increased shortness of breath  Shortness of breath while laying down  Cough  Chest pain  Swelling in feet, ankles or legs  Tenderness or bloating in the abdomen  Fatigue   Decreased appetite or nausea   Confusion      Return in about 3 months (around 6/15/2018). or sooner if needed     Patient given educational materials. HF zone sheet and \"where's the sodium\" sheets given - see patient instructions. We discussed the importance of weighing oneself and recording daily. We also discussed the importance of a low sodium diet, higher sodium foods to avoid and better low sodium food options. Patient verbalizes understanding of plan of care using teach back method, and is agreeable to the treatment plan.        Electronically signed by Parrish Vega CNP on 3/15/2018 at 1:10 PM

## 2018-03-15 NOTE — PATIENT INSTRUCTIONS
Continue:  · Continue current medications  · Daily weights and record  · Fluid restriction of 2 Liters per day  · Limit sodium in diet to around 4948-0906 mg/day  · Monitor BP  · Activity as tolerated     Call the Heart Failure Clinic for any of the following symptoms: [phone]  Weight gain of 2-3 pounds in 1 day or 5 pounds in 1 week  Increased shortness of breath  Shortness of breath while laying down  Cough  Chest pain  Swelling in feet, ankles or legs  Tenderness or bloating in the abdomen  Fatigue   Decreased appetite or nausea   Confusion

## 2018-04-16 ENCOUNTER — HOSPITAL ENCOUNTER (OUTPATIENT)
Age: 53
Discharge: HOME | End: 2018-04-16
Payer: MEDICARE

## 2018-04-16 LAB
ALBUMIN SERPL-MCNC: 3.8 G/DL (ref 3.5–5.1)
ALP BLD-CCNC: 93 U/L (ref 38–126)
ALT SERPL-CCNC: 20 U/L (ref 11–66)
ANION GAP SERPL CALCULATED.3IONS-SCNC: 11 MEQ/L (ref 8–16)
AST SERPL-CCNC: 16 U/L (ref 5–40)
AVERAGE GLUCOSE: 120 MG/DL (ref 70–126)
BILIRUB SERPL-MCNC: 0.2 MG/DL (ref 0.3–1.2)
BUN BLDV-MCNC: 18 MG/DL (ref 7–22)
CALCIUM SERPL-MCNC: 8.8 MG/DL (ref 8.5–10.5)
CARBAMAZEPINE, TOTAL: 4.8 MCG/ML (ref 2–10)
CHLORIDE BLD-SCNC: 102 MEQ/L (ref 98–111)
CHOLESTEROL, TOTAL: 146 MG/DL (ref 100–199)
CO2: 27 MEQ/L (ref 23–33)
CREAT SERPL-MCNC: 0.8 MG/DL (ref 0.4–1.2)
GFR SERPL CREATININE-BSD FRML MDRD: > 90 ML/MIN/1.73M2
GLUCOSE BLD-MCNC: 99 MG/DL (ref 70–108)
HBA1C MFR BLD: 6 % (ref 4.4–6.4)
HCT VFR BLD CALC: 38.7 % (ref 42–52)
HEMOGLOBIN: 12.7 GM/DL (ref 14–18)
MCH RBC QN AUTO: 27 PG (ref 27–31)
MCHC RBC AUTO-ENTMCNC: 32.8 GM/DL (ref 33–37)
MCV RBC AUTO: 82.3 FL (ref 80–94)
PDW BLD-RTO: 15.8 % (ref 11.5–14.5)
PLATELET # BLD: 161 THOU/MM3 (ref 130–400)
PMV BLD AUTO: 9.6 FL (ref 7.4–10.4)
POTASSIUM SERPL-SCNC: 4.4 MEQ/L (ref 3.5–5.2)
RBC # BLD: 4.7 MILL/MM3 (ref 4.7–6.1)
SODIUM BLD-SCNC: 140 MEQ/L (ref 135–145)
TOTAL PROTEIN: 7.1 G/DL (ref 6.1–8)
TRIGL SERPL-MCNC: 64 MG/DL (ref 0–199)
WBC # BLD: 5.7 THOU/MM3 (ref 4.8–10.8)

## 2018-04-16 PROCEDURE — 82465 ASSAY BLD/SERUM CHOLESTEROL: CPT

## 2018-04-16 PROCEDURE — 80053 COMPREHEN METABOLIC PANEL: CPT

## 2018-04-16 PROCEDURE — 36415 COLL VENOUS BLD VENIPUNCTURE: CPT

## 2018-04-16 PROCEDURE — 85027 COMPLETE CBC AUTOMATED: CPT

## 2018-04-16 PROCEDURE — 80156 ASSAY CARBAMAZEPINE TOTAL: CPT

## 2018-04-16 PROCEDURE — 84478 ASSAY OF TRIGLYCERIDES: CPT

## 2018-04-16 PROCEDURE — 83036 HEMOGLOBIN GLYCOSYLATED A1C: CPT

## 2018-06-09 ENCOUNTER — HOSPITAL ENCOUNTER (EMERGENCY)
Age: 53
Discharge: HOME | End: 2018-06-10
Attending: EMERGENCY MEDICINE
Payer: MEDICARE

## 2018-06-09 DIAGNOSIS — H10.501 BLEPHAROCONJUNCTIVITIS OF RIGHT EYE, UNSPECIFIED BLEPHAROCONJUNCTIVITIS TYPE: Primary | ICD-10-CM

## 2018-06-09 DIAGNOSIS — M79.18 MYOFASCIAL MUSCLE PAIN: ICD-10-CM

## 2018-06-09 PROCEDURE — 99284 EMERGENCY DEPT VISIT MOD MDM: CPT

## 2018-06-09 ASSESSMENT — PAIN SCALES - GENERAL: PAINLEVEL_OUTOF10: 7

## 2018-06-09 ASSESSMENT — PAIN DESCRIPTION - LOCATION: LOCATION: BACK

## 2018-06-09 ASSESSMENT — PAIN DESCRIPTION - DESCRIPTORS: DESCRIPTORS: CONSTANT;PRESSURE

## 2018-06-10 ENCOUNTER — APPOINTMENT (OUTPATIENT)
Dept: GENERAL RADIOLOGY | Age: 53
End: 2018-06-10
Payer: MEDICARE

## 2018-06-10 VITALS
WEIGHT: 315 LBS | SYSTOLIC BLOOD PRESSURE: 122 MMHG | TEMPERATURE: 98.2 F | HEART RATE: 71 BPM | OXYGEN SATURATION: 98 % | RESPIRATION RATE: 21 BRPM | BODY MASS INDEX: 38.36 KG/M2 | HEIGHT: 76 IN | DIASTOLIC BLOOD PRESSURE: 71 MMHG

## 2018-06-10 LAB
ALBUMIN SERPL-MCNC: 3.8 G/DL (ref 3.5–5.1)
ALP BLD-CCNC: 108 U/L (ref 38–126)
ALT SERPL-CCNC: 26 U/L (ref 11–66)
ANION GAP SERPL CALCULATED.3IONS-SCNC: 11 MEQ/L (ref 8–16)
ANISOCYTOSIS: ABNORMAL
AST SERPL-CCNC: 19 U/L (ref 5–40)
BASOPHILS # BLD: 0.5 %
BASOPHILS ABSOLUTE: 0 THOU/MM3 (ref 0–0.1)
BILIRUB SERPL-MCNC: 0.3 MG/DL (ref 0.3–1.2)
BUN BLDV-MCNC: 18 MG/DL (ref 7–22)
CALCIUM SERPL-MCNC: 8.7 MG/DL (ref 8.5–10.5)
CHLORIDE BLD-SCNC: 102 MEQ/L (ref 98–111)
CO2: 27 MEQ/L (ref 23–33)
CREAT SERPL-MCNC: 0.9 MG/DL (ref 0.4–1.2)
EKG ATRIAL RATE: 73 BPM
EKG P AXIS: 44 DEGREES
EKG P-R INTERVAL: 182 MS
EKG Q-T INTERVAL: 390 MS
EKG QRS DURATION: 116 MS
EKG QTC CALCULATION (BAZETT): 429 MS
EKG R AXIS: -9 DEGREES
EKG T AXIS: 32 DEGREES
EKG VENTRICULAR RATE: 73 BPM
EOSINOPHIL # BLD: 2.7 %
EOSINOPHILS ABSOLUTE: 0.2 THOU/MM3 (ref 0–0.4)
GFR SERPL CREATININE-BSD FRML MDRD: > 90 ML/MIN/1.73M2
GLUCOSE BLD-MCNC: 100 MG/DL (ref 70–108)
HCT VFR BLD CALC: 40.5 % (ref 42–52)
HEMOGLOBIN: 13.4 GM/DL (ref 14–18)
LIPASE: 23.2 U/L (ref 5.6–51.3)
LYMPHOCYTES # BLD: 30.9 %
LYMPHOCYTES ABSOLUTE: 2.3 THOU/MM3 (ref 1–4.8)
MCH RBC QN AUTO: 27.3 PG (ref 27–31)
MCHC RBC AUTO-ENTMCNC: 33 GM/DL (ref 33–37)
MCV RBC AUTO: 82.7 FL (ref 80–94)
MONOCYTES # BLD: 11.9 %
MONOCYTES ABSOLUTE: 0.9 THOU/MM3 (ref 0.4–1.3)
NUCLEATED RED BLOOD CELLS: 0 /100 WBC
OSMOLALITY CALCULATION: 281.4 MOSMOL/KG (ref 275–300)
PDW BLD-RTO: 16.4 % (ref 11.5–14.5)
PLATELET # BLD: 158 THOU/MM3 (ref 130–400)
PMV BLD AUTO: 9.8 FL (ref 7.4–10.4)
POTASSIUM SERPL-SCNC: 4.5 MEQ/L (ref 3.5–5.2)
RBC # BLD: 4.9 MILL/MM3 (ref 4.7–6.1)
SEG NEUTROPHILS: 54 %
SEGMENTED NEUTROPHILS ABSOLUTE COUNT: 4.1 THOU/MM3 (ref 1.8–7.7)
SODIUM BLD-SCNC: 140 MEQ/L (ref 135–145)
TOTAL PROTEIN: 7.3 G/DL (ref 6.1–8)
TROPONIN T: < 0.01 NG/ML
WBC # BLD: 7.5 THOU/MM3 (ref 4.8–10.8)

## 2018-06-10 PROCEDURE — 93005 ELECTROCARDIOGRAM TRACING: CPT | Performed by: EMERGENCY MEDICINE

## 2018-06-10 PROCEDURE — 83690 ASSAY OF LIPASE: CPT

## 2018-06-10 PROCEDURE — 71045 X-RAY EXAM CHEST 1 VIEW: CPT

## 2018-06-10 PROCEDURE — 93010 ELECTROCARDIOGRAM REPORT: CPT | Performed by: INTERNAL MEDICINE

## 2018-06-10 PROCEDURE — 6370000000 HC RX 637 (ALT 250 FOR IP): Performed by: EMERGENCY MEDICINE

## 2018-06-10 PROCEDURE — 96372 THER/PROPH/DIAG INJ SC/IM: CPT

## 2018-06-10 PROCEDURE — 85025 COMPLETE CBC W/AUTO DIFF WBC: CPT

## 2018-06-10 PROCEDURE — 80053 COMPREHEN METABOLIC PANEL: CPT

## 2018-06-10 PROCEDURE — 84484 ASSAY OF TROPONIN QUANT: CPT

## 2018-06-10 PROCEDURE — 36415 COLL VENOUS BLD VENIPUNCTURE: CPT

## 2018-06-10 PROCEDURE — 6360000002 HC RX W HCPCS: Performed by: EMERGENCY MEDICINE

## 2018-06-10 RX ORDER — LIDOCAINE 50 MG/G
1 PATCH TOPICAL ONCE
Status: DISCONTINUED | OUTPATIENT
Start: 2018-06-10 | End: 2018-06-10 | Stop reason: HOSPADM

## 2018-06-10 RX ORDER — CYCLOBENZAPRINE HCL 10 MG
10 TABLET ORAL 3 TIMES DAILY PRN
Qty: 30 TABLET | Refills: 0 | Status: SHIPPED | OUTPATIENT
Start: 2018-06-10 | End: 2018-06-20

## 2018-06-10 RX ORDER — LIDOCAINE 50 MG/G
1 PATCH TOPICAL DAILY
Qty: 15 PATCH | Refills: 0 | Status: SHIPPED | OUTPATIENT
Start: 2018-06-10 | End: 2018-12-20 | Stop reason: ALTCHOICE

## 2018-06-10 RX ORDER — POLYMYXIN B SULFATE AND TRIMETHOPRIM 1; 10000 MG/ML; [USP'U]/ML
1 SOLUTION OPHTHALMIC EVERY 4 HOURS
Qty: 10 ML | Refills: 0 | Status: SHIPPED | OUTPATIENT
Start: 2018-06-10 | End: 2018-06-20

## 2018-06-10 RX ORDER — KETOROLAC TROMETHAMINE 30 MG/ML
30 INJECTION, SOLUTION INTRAMUSCULAR; INTRAVENOUS ONCE
Status: COMPLETED | OUTPATIENT
Start: 2018-06-10 | End: 2018-06-10

## 2018-06-10 RX ADMIN — KETOROLAC TROMETHAMINE 30 MG: 30 INJECTION, SOLUTION INTRAMUSCULAR at 01:27

## 2018-06-10 ASSESSMENT — ENCOUNTER SYMPTOMS
EYE DISCHARGE: 1
EYE PAIN: 1
EYE ITCHING: 0
RHINORRHEA: 0
COUGH: 0
SHORTNESS OF BREATH: 0
ABDOMINAL DISTENTION: 0
DIARRHEA: 0
WHEEZING: 0
VOMITING: 0
ABDOMINAL PAIN: 0
EYE REDNESS: 1
NAUSEA: 0
BACK PAIN: 1

## 2018-06-10 ASSESSMENT — PAIN SCALES - GENERAL
PAINLEVEL_OUTOF10: 7
PAINLEVEL_OUTOF10: 7
PAINLEVEL_OUTOF10: 4

## 2018-06-13 ENCOUNTER — OFFICE VISIT (OUTPATIENT)
Dept: CARDIOLOGY CLINIC | Age: 53
End: 2018-06-13
Payer: MEDICARE

## 2018-06-13 VITALS
HEART RATE: 79 BPM | DIASTOLIC BLOOD PRESSURE: 70 MMHG | WEIGHT: 315 LBS | OXYGEN SATURATION: 95 % | HEIGHT: 76 IN | SYSTOLIC BLOOD PRESSURE: 116 MMHG | BODY MASS INDEX: 38.36 KG/M2

## 2018-06-13 DIAGNOSIS — I50.32 CHF (CONGESTIVE HEART FAILURE), NYHA CLASS II, CHRONIC, DIASTOLIC (HCC): Primary | ICD-10-CM

## 2018-06-13 PROCEDURE — 99213 OFFICE O/P EST LOW 20 MIN: CPT | Performed by: NURSE PRACTITIONER

## 2018-06-13 RX ORDER — PROMETHAZINE HYDROCHLORIDE AND CODEINE PHOSPHATE 6.25; 1 MG/5ML; MG/5ML
5 SYRUP ORAL 4 TIMES DAILY PRN
COMMUNITY
End: 2018-12-20 | Stop reason: ALTCHOICE

## 2018-06-13 ASSESSMENT — ENCOUNTER SYMPTOMS
WHEEZING: 0
COUGH: 0
ABDOMINAL DISTENTION: 0
ABDOMINAL PAIN: 0
APNEA: 0
SHORTNESS OF BREATH: 1
NAUSEA: 0
COLOR CHANGE: 0
CHEST TIGHTNESS: 0

## 2018-09-24 ENCOUNTER — OFFICE VISIT (OUTPATIENT)
Dept: CARDIOLOGY CLINIC | Age: 53
End: 2018-09-24
Payer: MEDICARE

## 2018-09-24 ENCOUNTER — TELEPHONE (OUTPATIENT)
Dept: CARDIOLOGY CLINIC | Age: 53
End: 2018-09-24

## 2018-09-24 VITALS
WEIGHT: 315 LBS | HEIGHT: 76 IN | BODY MASS INDEX: 38.36 KG/M2 | HEART RATE: 75 BPM | OXYGEN SATURATION: 94 % | SYSTOLIC BLOOD PRESSURE: 126 MMHG | DIASTOLIC BLOOD PRESSURE: 78 MMHG

## 2018-09-24 DIAGNOSIS — I50.32 CHF (CONGESTIVE HEART FAILURE), NYHA CLASS II, CHRONIC, DIASTOLIC (HCC): Primary | ICD-10-CM

## 2018-09-24 PROCEDURE — 99213 OFFICE O/P EST LOW 20 MIN: CPT | Performed by: NURSE PRACTITIONER

## 2018-09-24 RX ORDER — TRAZODONE HYDROCHLORIDE 50 MG/1
50 TABLET ORAL NIGHTLY
COMMUNITY
Start: 2018-09-14 | End: 2019-02-04 | Stop reason: ALTCHOICE

## 2018-09-24 ASSESSMENT — ENCOUNTER SYMPTOMS
ABDOMINAL PAIN: 0
WHEEZING: 0
CHEST TIGHTNESS: 0
NAUSEA: 0
COLOR CHANGE: 0
APNEA: 0
COUGH: 0
ABDOMINAL DISTENTION: 0
SHORTNESS OF BREATH: 1

## 2018-09-24 NOTE — TELEPHONE ENCOUNTER
Discussed [facility] program with patient   Patient is interested   Name given to [facility]

## 2018-09-24 NOTE — PROGRESS NOTES
[facility]       Visit Date: 9/24/2018  Cardiologist:  Dr. Jovana Cain  Primary Care Physician: Dr. Olga Lidia Reynoso primary care provider on file. Aylin Capps is a 48 y.o. male who presents today for:  Chief Complaint   Patient presents with    Congestive Heart Failure       HPI:   Aylin Capps is a 48 y.o. male who presents to the office for a follow up visit in the heart failure clinic. He is up 6 pounds since his clinic visit in June. He garcia snot weigh himself daily. He has not noticed any edema, no orthopnea or PND. He can sleep in a chair. He can perform ADLs without SOB or fatigue. He had been walking but has stopped this. He has \"exercise equipment\" in his house but does not use it. He states he does get SOB with a flight of stairs or waling any distance.     Patient has:  Last hospital admission related to Heart Failure:  > 1 year  Chest Pain: no  Worsening SOB/orthopnea/PND: no  Edema: no  Any extra diuretic use: no  Weight gain: yes on the clinic scale  Compliant checking home weight: no  Fatigue: no  Abdominal bloating: no  Appetite: good  Difficulty sleeping: KRISTEN compliant with CPAP - follows with CPM  Cough: no  Compliant checking blood pressure: no  Any refills on CHF medications needed at this time: no    Past Medical History:   Diagnosis Date    Anxiety     Arrhythmia     Arthritis     CAD (coronary artery disease)     Chest pain     HX OF:    CHF (congestive heart failure) (HCC)     Chronic back pain     COPD (chronic obstructive pulmonary disease) (HCC)     Depression     Diverticula of colon     Fatigue     Heart attack (HCC)     Hyperlipidemia     Hypertension     Panic attacks     Pneumonia     Sleep apnea     SOB (shortness of breath)     Spondylosis     sponylolisthesis L5    Swelling      Past Surgical History:   Procedure Laterality Date    CARDIAC CATHETERIZATION  01/30/2008    EF 20%    CARDIOVASCULAR STRESS TEST  02/16/2009    EF 52%    PRE-MALIGNANT / BENIGN to light. Neck: Normal range of motion. No JVD present. Cardiovascular: Normal rate and normal heart sounds. No murmur heard. Pulmonary/Chest: Effort normal. No respiratory distress. He has no rales. Abdominal: Soft. He exhibits no distension. There is no tenderness. Musculoskeletal: He exhibits no edema or tenderness. Neurological: He is alert and oriented to person, place, and time. Skin: Skin is warm and dry. Psychiatric: He has a normal mood and affect. His behavior is normal.   Vitals reviewed. Wt Readings from Last 3 Encounters:   09/24/18 (!) 350 lb (158.8 kg)   06/13/18 (!) 344 lb (156 kg)   06/09/18 (!) 342 lb (155.1 kg)     BP Readings from Last 3 Encounters:   09/24/18 126/78   06/13/18 116/70   06/10/18 122/71     Pulse Readings from Last 3 Encounters:   09/24/18 75   06/13/18 79   06/10/18 71     Body mass index is 42.6 kg/m². ECHO:   1/31/17   Summary   limited echo   Technically difficult examination.   Ejection fraction is visually estimated at 45%.   There was mild global hypokinesis of the left ventricle.      Signature      ----------------------------------------------------------------   Electronically signed by Bernard Cintron MD (Interpreting   physician) on 02/01/2017 at 07:41 AM   ----------------------------------------------------------------      Findings      Mitral Valve   Structurally normal mitral valve.      Aortic Valve   The aortic valve leaflets were not well visualized.   Aortic valve appears tricuspid.      Tricuspid Valve   Tricuspid valve was not well visualized.      Pulmonic Valve   The pulmonic valve was not well visualized .      Left Atrium   Normal size left atrium.      Left Ventricle   Ejection fraction is visually estimated at 45%.   There was mild global hypokinesis of the left ventricle.     Results reviewed:  BNP:   Lab Results   Component Value Date    BNP 11.7 02/16/2014     CBC:   Lab Results   Component Value Date    WBC 7.5 06/10/2018    RBC 4.90 06/10/2018    RBC 4.76 09/14/2011    HGB 13.4 06/10/2018    HCT 40.5 06/10/2018     06/10/2018     CMP:    Lab Results   Component Value Date     06/10/2018    K 4.5 06/10/2018     06/10/2018    CO2 27 06/10/2018    BUN 18 06/10/2018    CREATININE 0.9 06/10/2018    LABGLOM >90 06/10/2018    GLUCOSE 100 06/10/2018    GLUCOSE 107 05/16/2012    CALCIUM 8.7 06/10/2018     Hepatic Function Panel:    Lab Results   Component Value Date    ALKPHOS 108 06/10/2018    ALT 26 06/10/2018    AST 19 06/10/2018    PROT 7.3 06/10/2018    BILITOT 0.3 06/10/2018    BILIDIR <0.2 03/15/2017    LABALBU 3.8 06/10/2018    LABALBU 4.4 09/14/2011     Magnesium:    Lab Results   Component Value Date    MG 1.8 06/27/2016     PT/INR:    Lab Results   Component Value Date    INR 0.98 04/21/2014     Lipids:    Lab Results   Component Value Date    TRIG 64 04/16/2018    HDL 37 03/15/2017    [address] 90 03/15/2017       ASSESSMENT AND PLAN:   The patient's condition/symptoms are Stable: No clinical evidence of fluid overload today. Continue current medical regimen without changes at present time.      Diagnosis Orders   1. CHF (congestive heart failure), NYHA class II, chronic, diastolic (HCC)         Continue:  · Continue current medications - Lisinopril 10 mg daily, Bumex 1 mg daily, Aldactone 12.5 mg daily, Coreg 25 mg bid. He has not had to take an extra Bumex since his last clinic visit. We again reinforced the need to follow a low sodium diet and weigh himself daily. HF Zones were reviewed. We also discussed starting a waling program again which he states he is willing to do.    · Daily weights  · Fluid restriction of 2 Liters per day  · Limit sodium in diet to around 6902-6564 mg/day  · Monitor BP  · Activity as tolerated     Patient was instructed to call the [facility] for changes in the following symptoms:   Weight gain of 3 pounds in 1 day or 5 pounds in 1 week  Increased shortness of

## 2018-10-30 ENCOUNTER — TELEPHONE (OUTPATIENT)
Dept: CARDIAC REHAB | Age: 53
End: 2018-10-30

## 2018-10-30 NOTE — TELEPHONE ENCOUNTER
LVM regarding nutrition workshops for diastolic HF. Openings available Nov 6th and Nov 20th at 2p. Call back number provided to confirm.

## 2018-12-20 ENCOUNTER — TELEPHONE (OUTPATIENT)
Dept: CARDIOLOGY CLINIC | Age: 53
End: 2018-12-20

## 2018-12-20 ENCOUNTER — OFFICE VISIT (OUTPATIENT)
Dept: CARDIOLOGY CLINIC | Age: 53
End: 2018-12-20
Payer: MEDICARE

## 2018-12-20 VITALS
BODY MASS INDEX: 38.36 KG/M2 | OXYGEN SATURATION: 95 % | HEART RATE: 77 BPM | DIASTOLIC BLOOD PRESSURE: 68 MMHG | WEIGHT: 315 LBS | HEIGHT: 76 IN | SYSTOLIC BLOOD PRESSURE: 108 MMHG

## 2018-12-20 DIAGNOSIS — I50.32 CHF (CONGESTIVE HEART FAILURE), NYHA CLASS II, CHRONIC, DIASTOLIC (HCC): Primary | ICD-10-CM

## 2018-12-20 DIAGNOSIS — E13.9 DIABETES 1.5, MANAGED AS TYPE 2 (HCC): ICD-10-CM

## 2018-12-20 LAB
ANION GAP SERPL CALCULATED.3IONS-SCNC: 9 MEQ/L (ref 8–16)
AVERAGE GLUCOSE: 120 MG/DL (ref 70–126)
BUN BLDV-MCNC: 25 MG/DL (ref 7–22)
CALCIUM SERPL-MCNC: 9 MG/DL (ref 8.5–10.5)
CHLORIDE BLD-SCNC: 103 MEQ/L (ref 98–111)
CO2: 25 MEQ/L (ref 23–33)
CREAT SERPL-MCNC: 1 MG/DL (ref 0.4–1.2)
GFR SERPL CREATININE-BSD FRML MDRD: 78 ML/MIN/1.73M2
GLUCOSE BLD-MCNC: 95 MG/DL (ref 70–108)
HBA1C MFR BLD: 6 % (ref 4.4–6.4)
POTASSIUM SERPL-SCNC: 5 MEQ/L (ref 3.5–5.2)
SODIUM BLD-SCNC: 137 MEQ/L (ref 135–145)

## 2018-12-20 PROCEDURE — 99213 OFFICE O/P EST LOW 20 MIN: CPT | Performed by: NURSE PRACTITIONER

## 2018-12-20 PROCEDURE — 36415 COLL VENOUS BLD VENIPUNCTURE: CPT | Performed by: NURSE PRACTITIONER

## 2018-12-20 ASSESSMENT — ENCOUNTER SYMPTOMS
CHEST TIGHTNESS: 0
ABDOMINAL DISTENTION: 0
WHEEZING: 0
APNEA: 0
NAUSEA: 0
COUGH: 0
ABDOMINAL PAIN: 0
COLOR CHANGE: 0
SHORTNESS OF BREATH: 1

## 2018-12-20 NOTE — PROGRESS NOTES
Venipuncture obtained from right arm. Patient tolerated procedure without complications or complaints.

## 2018-12-20 NOTE — PROGRESS NOTES
PRE-MALIGNANT / BENIGN SKIN LESION EXCISION  5/17/12    mole on abd-left arm-Dr. Mariusz Sheikh    TRANSTHORACIC ECHOCARDIOGRAM  07/22/2011    EF 55-65%     Family History   Problem Relation Age of Onset    High Cholesterol Father     High Blood Pressure Father     Coronary Art Dis Father     Cancer Maternal Grandmother         colon     Social History   Substance Use Topics    Smoking status: Passive Smoke Exposure - Never Smoker    Smokeless tobacco: Never Used    Alcohol use No      Comment: quit drinking      Current Outpatient Prescriptions   Medication Sig Dispense Refill    traZODone (DESYREL) 50 MG tablet Take 50 mg by mouth nightly      umeclidinium-vilanterol (ANORO ELLIPTA) 62.5-25 MCG/INH AEPB inhaler Inhale 1 puff into the lungs daily      metFORMIN (GLUCOPHAGE-XR) 500 MG extended release tablet Take 500 mg by mouth daily (with breakfast)      ibuprofen (ADVIL;MOTRIN) 800 MG tablet Take 0.5 tablets by mouth every 6 hours as needed for Pain 40 tablet 0    lisinopril (PRINIVIL;ZESTRIL) 10 MG tablet Take 1 tablet by mouth daily 30 tablet 11    clonazePAM (KLONOPIN) 0.5 MG tablet Take 0.5 mg by mouth 2 times daily as needed      allopurinol (ZYLOPRIM) 300 MG tablet Take 1 tablet by mouth daily 30 tablet 5    atorvastatin (LIPITOR) 20 MG tablet TAKE 1 TABLET BY MOUTH ONE TIME A DAY 30 tablet 5    bumetanide (BUMEX) 0.5 MG tablet Take 2 tablets daily. If weight gain 3Ibs in 1 day or 5Ibs in 1 week, take 2 tablets in AM and PM as needed.  60 tablet 5    carvedilol (COREG) 25 MG tablet Take 1 tablet by mouth 2 times daily 60 tablet 5    ranitidine (ZANTAC) 150 MG tablet TAKE ONE TABLET BY MOUTH TWICE DAILY 60 tablet 5    spironolactone (ALDACTONE) 25 MG tablet Take 0.5 tablets by mouth daily 15 tablet 5    meclizine (ANTIVERT) 25 MG tablet Take 1 tablet by mouth 3 times daily as needed 60 tablet 5    carBAMazepine (TEGRETOL) 200 MG tablet Take 400 mg by mouth nightly       risperiDONE edema or tenderness. Neurological: He is alert and oriented to person, place, and time. Skin: Skin is warm and dry. Psychiatric: He has a normal mood and affect. His behavior is normal.   Vitals reviewed. Wt Readings from Last 3 Encounters:   12/20/18 (!) 350 lb (158.8 kg)   09/24/18 (!) 350 lb (158.8 kg)   06/13/18 (!) 344 lb (156 kg)     BP Readings from Last 3 Encounters:   12/20/18 108/68   09/24/18 126/78   06/13/18 116/70     Pulse Readings from Last 3 Encounters:   12/20/18 77   09/24/18 75   06/13/18 79     Body mass index is 42.6 kg/m². ECHO:   1/31/17  Summary   limited echo   Technically difficult examination.   Ejection fraction is visually estimated at 45%.   There was mild global hypokinesis of the left ventricle.      Signature      ----------------------------------------------------------------   Electronically signed by Jeovany Huber MD (Interpreting   physician) on 02/01/2017 at 07:41 AM   ----------------------------------------------------------------      Findings      Mitral Valve   Structurally normal mitral valve.      Aortic Valve   The aortic valve leaflets were not well visualized.   Aortic valve appears tricuspid.      Tricuspid Valve   Tricuspid valve was not well visualized.      Pulmonic Valve   The pulmonic valve was not well visualized .      Left Atrium   Normal size left atrium.      Left Ventricle   Ejection fraction is visually estimated at 45%.   There was mild global hypokinesis of the left ventricle.     Results reviewed:  BNP:   Lab Results   Component Value Date    BNP 11.7 02/16/2014    PROBNP 28.4 06/27/2016     CBC:   Lab Results   Component Value Date    WBC 7.5 06/10/2018    RBC 4.90 06/10/2018    RBC 4.76 09/14/2011    HGB 13.4 06/10/2018    HCT 40.5 06/10/2018     06/10/2018     CMP:    Lab Results   Component Value Date     06/10/2018    K 4.5 06/10/2018     06/10/2018    CO2 27 06/10/2018    BUN 18 06/10/2018    CREATININE 0.9 and better low sodium food options. Discussed use, benefit, and side effects of prescribed medications. All patient questions answered. Patient verbalizes understanding of plan of care using teach back method, and is agreeable to the treatment plan.        Electronicallysigned by MARCO Caballero CNP on 12/20/2018 at 8:52 AM

## 2018-12-20 NOTE — TELEPHONE ENCOUNTER
Please inform Julio his lab results are normal and his blood sugar is wnl. He can discuss any further concerns regarding this with his PCP.   Please fax results to his PCP  Thank you

## 2019-02-04 ENCOUNTER — OFFICE VISIT (OUTPATIENT)
Dept: CARDIOLOGY CLINIC | Age: 54
End: 2019-02-04
Payer: MEDICARE

## 2019-02-04 VITALS
WEIGHT: 315 LBS | DIASTOLIC BLOOD PRESSURE: 78 MMHG | HEART RATE: 80 BPM | HEIGHT: 76 IN | BODY MASS INDEX: 38.36 KG/M2 | SYSTOLIC BLOOD PRESSURE: 118 MMHG

## 2019-02-04 DIAGNOSIS — I10 ESSENTIAL HYPERTENSION: ICD-10-CM

## 2019-02-04 DIAGNOSIS — I42.0 DILATED CARDIOMYOPATHY (HCC): Primary | ICD-10-CM

## 2019-02-04 PROCEDURE — 99213 OFFICE O/P EST LOW 20 MIN: CPT | Performed by: NUCLEAR MEDICINE

## 2019-02-04 RX ORDER — SILDENAFIL 100 MG/1
100 TABLET, FILM COATED ORAL PRN
COMMUNITY
End: 2019-02-04 | Stop reason: CLARIF

## 2019-02-04 RX ORDER — DOCUSATE SODIUM 100 MG/1
100 CAPSULE, LIQUID FILLED ORAL 2 TIMES DAILY
COMMUNITY
End: 2019-07-09

## 2019-02-04 RX ORDER — TRAMADOL HYDROCHLORIDE 50 MG/1
50 TABLET ORAL EVERY 8 HOURS PRN
COMMUNITY
End: 2022-09-26

## 2019-02-08 ENCOUNTER — HOSPITAL ENCOUNTER (OUTPATIENT)
Dept: NON INVASIVE DIAGNOSTICS | Age: 54
Discharge: HOME | End: 2019-02-08
Payer: MEDICARE

## 2019-02-08 DIAGNOSIS — I10 ESSENTIAL HYPERTENSION: ICD-10-CM

## 2019-02-08 DIAGNOSIS — I42.0 DILATED CARDIOMYOPATHY (HCC): ICD-10-CM

## 2019-02-08 LAB
LV EF: 55 %
LVEF MODALITY: NORMAL

## 2019-02-08 PROCEDURE — 93306 TTE W/DOPPLER COMPLETE: CPT

## 2019-03-14 ENCOUNTER — OFFICE VISIT (OUTPATIENT)
Dept: PULMONOLOGY | Age: 54
End: 2019-03-14
Payer: MEDICARE

## 2019-03-14 VITALS
SYSTOLIC BLOOD PRESSURE: 98 MMHG | WEIGHT: 315 LBS | HEIGHT: 76 IN | DIASTOLIC BLOOD PRESSURE: 72 MMHG | BODY MASS INDEX: 38.36 KG/M2 | HEART RATE: 88 BPM | OXYGEN SATURATION: 98 %

## 2019-03-14 DIAGNOSIS — E66.01 MORBID OBESITY (HCC): ICD-10-CM

## 2019-03-14 DIAGNOSIS — Z99.89 OSA ON CPAP: Primary | ICD-10-CM

## 2019-03-14 DIAGNOSIS — I42.8 CARDIOMYOPATHY, NONISCHEMIC (HCC): ICD-10-CM

## 2019-03-14 DIAGNOSIS — G47.33 OSA ON CPAP: Primary | ICD-10-CM

## 2019-03-14 PROCEDURE — 99213 OFFICE O/P EST LOW 20 MIN: CPT | Performed by: PHYSICIAN ASSISTANT

## 2019-03-14 ASSESSMENT — ENCOUNTER SYMPTOMS
NAUSEA: 0
WHEEZING: 0
BACK PAIN: 0
ALLERGIC/IMMUNOLOGIC NEGATIVE: 1
SHORTNESS OF BREATH: 0
STRIDOR: 0
CHEST TIGHTNESS: 0
EYES NEGATIVE: 1
DIARRHEA: 0
COUGH: 0

## 2019-03-26 ENCOUNTER — OFFICE VISIT (OUTPATIENT)
Dept: CARDIOLOGY CLINIC | Age: 54
End: 2019-03-26
Payer: MEDICARE

## 2019-03-26 VITALS
DIASTOLIC BLOOD PRESSURE: 71 MMHG | WEIGHT: 315 LBS | SYSTOLIC BLOOD PRESSURE: 104 MMHG | BODY MASS INDEX: 38.36 KG/M2 | HEIGHT: 76 IN | HEART RATE: 70 BPM | OXYGEN SATURATION: 96 %

## 2019-03-26 DIAGNOSIS — I50.32 CHF (CONGESTIVE HEART FAILURE), NYHA CLASS II, CHRONIC, DIASTOLIC (HCC): Primary | ICD-10-CM

## 2019-03-26 PROCEDURE — 99213 OFFICE O/P EST LOW 20 MIN: CPT | Performed by: NURSE PRACTITIONER

## 2019-03-26 ASSESSMENT — ENCOUNTER SYMPTOMS
COUGH: 0
WHEEZING: 0
ABDOMINAL PAIN: 0
NAUSEA: 0
CHEST TIGHTNESS: 0
ABDOMINAL DISTENTION: 0
SHORTNESS OF BREATH: 1
COLOR CHANGE: 0
APNEA: 0

## 2019-07-09 ENCOUNTER — OFFICE VISIT (OUTPATIENT)
Dept: CARDIOLOGY CLINIC | Age: 54
End: 2019-07-09
Payer: MEDICARE

## 2019-07-09 VITALS
WEIGHT: 315 LBS | DIASTOLIC BLOOD PRESSURE: 80 MMHG | BODY MASS INDEX: 38.36 KG/M2 | HEIGHT: 76 IN | SYSTOLIC BLOOD PRESSURE: 118 MMHG | HEART RATE: 81 BPM | OXYGEN SATURATION: 97 %

## 2019-07-09 DIAGNOSIS — I50.32 CHF (CONGESTIVE HEART FAILURE), NYHA CLASS II, CHRONIC, DIASTOLIC (HCC): Primary | ICD-10-CM

## 2019-07-09 LAB
ANION GAP SERPL CALCULATED.3IONS-SCNC: 13 MEQ/L (ref 8–16)
BUN BLDV-MCNC: 19 MG/DL (ref 7–22)
CALCIUM SERPL-MCNC: 9.3 MG/DL (ref 8.5–10.5)
CHLORIDE BLD-SCNC: 101 MEQ/L (ref 98–111)
CO2: 28 MEQ/L (ref 23–33)
CREAT SERPL-MCNC: 1 MG/DL (ref 0.4–1.2)
GFR SERPL CREATININE-BSD FRML MDRD: 78 ML/MIN/1.73M2
GLUCOSE BLD-MCNC: 106 MG/DL (ref 70–108)
POTASSIUM SERPL-SCNC: 4.6 MEQ/L (ref 3.5–5.2)
SODIUM BLD-SCNC: 142 MEQ/L (ref 135–145)

## 2019-07-09 PROCEDURE — 99213 OFFICE O/P EST LOW 20 MIN: CPT | Performed by: NURSE PRACTITIONER

## 2019-07-09 PROCEDURE — 36415 COLL VENOUS BLD VENIPUNCTURE: CPT | Performed by: NURSE PRACTITIONER

## 2019-07-09 ASSESSMENT — ENCOUNTER SYMPTOMS
CHEST TIGHTNESS: 0
SHORTNESS OF BREATH: 1
ABDOMINAL DISTENTION: 0
ABDOMINAL PAIN: 0
NAUSEA: 0
COUGH: 0
APNEA: 0
COLOR CHANGE: 0
WHEEZING: 0

## 2019-07-09 NOTE — PROGRESS NOTES
person, place, and time. Skin: Skin is warm and dry. Psychiatric: He has a normal mood and affect. His behavior is normal.   Vitals reviewed. Wt Readings from Last 3 Encounters:   07/09/19 (!) 352 lb 6.4 oz (159.8 kg)   03/26/19 (!) 354 lb 9.6 oz (160.8 kg)   03/14/19 (!) 352 lb 12.8 oz (160 kg)     BP Readings from Last 3 Encounters:   07/09/19 118/80   03/26/19 104/71   03/14/19 98/72     Pulse Readings from Last 3 Encounters:   07/09/19 81   03/26/19 70   03/14/19 88     Body mass index is 42.9 kg/m². ECHO:   2/8/19   Summary   Ejection fraction is visually estimated at 55%.   Overall left ventricular function is normal.      Signature      ----------------------------------------------------------------   Electronically signed by Akua Steinberg MD (Interpreting   physician) on 02/08/2019 at 04:44 PM   ----------------------------------------------------------------      Findings      Mitral Valve   The mitral valve structure was normal with normal leaflet separation.   DOPPLER: The transmitral velocity was within the normal range with no   evidence for mitral stenosis. There was no evidence of mitral   regurgitation.      Aortic Valve   The aortic valve was trileaflet with normal thickness and cuspal   separation. DOPPLER: Transaortic velocity was within the normal range with   no evidence of aortic stenosis. There was no evidence of aortic   regurgitation.      Tricuspid Valve   The tricuspid valve structure was normal with normal leaflet separation.   DOPPLER: There was no evidence of tricuspid stenosis.  There was no   evidence of tricuspid regurgitation.      Pulmonic Valve   The pulmonic valve was not well visualized .      Left Atrium   Left atrial size was normal.      Left Ventricle   Ejection fraction is visually estimated at 55%.   Overall left ventricular function is normal.      Right Atrium   Right atrial size was normal.      Right Ventricle  [place] dilated right reviewed. · Daily weights  · Fluid restriction of 2 Liters per day  · Limit sodium in diet to around 4004-7611 mg/day  · Monitor BP  · Activity as tolerated     Patient was instructed to call the [address] for changes in the following symptoms:   Weight gain of 3 pounds in 1 day or 5 pounds in 1 week  Increased shortness of breath  Shortness of breath while laying down  Cough  Chest pain  Swelling in feet, ankles or legs  Tenderness or bloating in the abdomen  Fatigue   Decreased appetite or nausea   Confusion      Return in about 3 months (around 10/9/2019). or sooner if needed     Patient given educational materials - see patient instructions. We discussed the importance of weighing oneself and recording daily. We also discussed the importance of a lowsodium diet, higher sodium foods to avoid and better low sodium food options. Discussed use, benefit, and side effects of prescribed medications. All patient questions answered. Patient verbalizes understanding of plan of care using teach back method, and is agreeable to the treatment plan.        Electronicallysigned by MARCO Ashby CNP on 7/9/2019 at 9:11 AM

## 2019-08-02 ENCOUNTER — HOSPITAL ENCOUNTER (OUTPATIENT)
Age: 54
Discharge: HOME | End: 2019-08-02
Payer: MEDICARE

## 2019-08-02 LAB
ALBUMIN SERPL-MCNC: 4.2 G/DL (ref 3.5–5.1)
ALP BLD-CCNC: 88 U/L (ref 38–126)
ALT SERPL-CCNC: 18 U/L (ref 11–66)
ANION GAP SERPL CALCULATED.3IONS-SCNC: 14 MEQ/L (ref 8–16)
AST SERPL-CCNC: 17 U/L (ref 5–40)
BASOPHILS # BLD: 0.5 %
BASOPHILS ABSOLUTE: 0 THOU/MM3 (ref 0–0.1)
BILIRUB SERPL-MCNC: 0.3 MG/DL (ref 0.3–1.2)
BUN BLDV-MCNC: 17 MG/DL (ref 7–22)
CALCIUM SERPL-MCNC: 9 MG/DL (ref 8.5–10.5)
CARBAMAZEPINE, TOTAL: 6.5 MCG/ML (ref 2–10)
CHLORIDE BLD-SCNC: 101 MEQ/L (ref 98–111)
CHOLESTEROL, TOTAL: 132 MG/DL (ref 100–199)
CO2: 25 MEQ/L (ref 23–33)
CREAT SERPL-MCNC: 0.9 MG/DL (ref 0.4–1.2)
EOSINOPHIL # BLD: 2.2 %
EOSINOPHILS ABSOLUTE: 0.1 THOU/MM3 (ref 0–0.4)
ERYTHROCYTE [DISTWIDTH] IN BLOOD BY AUTOMATED COUNT: 15.9 % (ref 11.5–14.5)
ERYTHROCYTE [DISTWIDTH] IN BLOOD BY AUTOMATED COUNT: 49.3 FL (ref 35–45)
GFR SERPL CREATININE-BSD FRML MDRD: > 90 ML/MIN/1.73M2
GLUCOSE BLD-MCNC: 101 MG/DL (ref 70–108)
HCT VFR BLD CALC: 40.7 % (ref 42–52)
HDLC SERPL-MCNC: 41 MG/DL
HEMOGLOBIN: 12.7 GM/DL (ref 14–18)
IMMATURE GRANS (ABS): 0.02 THOU/MM3 (ref 0–0.07)
IMMATURE GRANULOCYTES: 0.3 %
LDL CHOLESTEROL CALCULATED: 73 MG/DL
LYMPHOCYTES # BLD: 36.4 %
LYMPHOCYTES ABSOLUTE: 2.2 THOU/MM3 (ref 1–4.8)
MCH RBC QN AUTO: 26.8 PG (ref 26–33)
MCHC RBC AUTO-ENTMCNC: 31.2 GM/DL (ref 32.2–35.5)
MCV RBC AUTO: 85.9 FL (ref 80–94)
MONOCYTES # BLD: 9.9 %
MONOCYTES ABSOLUTE: 0.6 THOU/MM3 (ref 0.4–1.3)
NUCLEATED RED BLOOD CELLS: 0 /100 WBC
PLATELET # BLD: 174 THOU/MM3 (ref 130–400)
PMV BLD AUTO: 11.8 FL (ref 9.4–12.4)
POTASSIUM SERPL-SCNC: 4.4 MEQ/L (ref 3.5–5.2)
RBC # BLD: 4.74 MILL/MM3 (ref 4.7–6.1)
SEG NEUTROPHILS: 50.7 %
SEGMENTED NEUTROPHILS ABSOLUTE COUNT: 3 THOU/MM3 (ref 1.8–7.7)
SODIUM BLD-SCNC: 140 MEQ/L (ref 135–145)
T4 FREE: 0.9 NG/DL (ref 0.93–1.76)
TOTAL PROTEIN: 7.4 G/DL (ref 6.1–8)
TRIGL SERPL-MCNC: 89 MG/DL (ref 0–199)
TSH SERPL DL<=0.05 MIU/L-ACNC: 2.22 UIU/ML (ref 0.4–4.2)
WBC # BLD: 6 THOU/MM3 (ref 4.8–10.8)

## 2019-08-02 PROCEDURE — 80061 LIPID PANEL: CPT

## 2019-08-02 PROCEDURE — 80053 COMPREHEN METABOLIC PANEL: CPT

## 2019-08-02 PROCEDURE — 80156 ASSAY CARBAMAZEPINE TOTAL: CPT

## 2019-08-02 PROCEDURE — 84443 ASSAY THYROID STIM HORMONE: CPT

## 2019-08-02 PROCEDURE — 85025 COMPLETE CBC W/AUTO DIFF WBC: CPT

## 2019-08-02 PROCEDURE — 84439 ASSAY OF FREE THYROXINE: CPT

## 2019-08-02 PROCEDURE — 36415 COLL VENOUS BLD VENIPUNCTURE: CPT

## 2019-10-23 ENCOUNTER — APPOINTMENT (OUTPATIENT)
Dept: INTERVENTIONAL RADIOLOGY/VASCULAR | Age: 54
End: 2019-10-23
Payer: MEDICARE

## 2019-10-23 ENCOUNTER — APPOINTMENT (OUTPATIENT)
Dept: GENERAL RADIOLOGY | Age: 54
End: 2019-10-23
Payer: MEDICARE

## 2019-10-23 ENCOUNTER — HOSPITAL ENCOUNTER (EMERGENCY)
Age: 54
Discharge: HOME | End: 2019-10-23
Payer: MEDICARE

## 2019-10-23 VITALS
OXYGEN SATURATION: 98 % | DIASTOLIC BLOOD PRESSURE: 74 MMHG | SYSTOLIC BLOOD PRESSURE: 137 MMHG | HEART RATE: 96 BPM | RESPIRATION RATE: 20 BRPM | TEMPERATURE: 98 F

## 2019-10-23 DIAGNOSIS — M25.562 ACUTE PAIN OF LEFT KNEE: Primary | ICD-10-CM

## 2019-10-23 PROCEDURE — 93971 EXTREMITY STUDY: CPT

## 2019-10-23 PROCEDURE — 73564 X-RAY EXAM KNEE 4 OR MORE: CPT

## 2019-10-23 PROCEDURE — L1830 KO IMMOB CANVAS LONG PRE OTS: HCPCS

## 2019-10-23 PROCEDURE — 6370000000 HC RX 637 (ALT 250 FOR IP): Performed by: PHYSICIAN ASSISTANT

## 2019-10-23 PROCEDURE — 99283 EMERGENCY DEPT VISIT LOW MDM: CPT

## 2019-10-23 RX ORDER — ACETAMINOPHEN 500 MG
500 TABLET ORAL EVERY 6 HOURS PRN
Qty: 120 TABLET | Refills: 0 | Status: SHIPPED | OUTPATIENT
Start: 2019-10-23

## 2019-10-23 RX ORDER — HYDROCODONE BITARTRATE AND ACETAMINOPHEN 5; 325 MG/1; MG/1
1 TABLET ORAL ONCE
Status: COMPLETED | OUTPATIENT
Start: 2019-10-23 | End: 2019-10-23

## 2019-10-23 RX ADMIN — HYDROCODONE BITARTRATE AND ACETAMINOPHEN 1 TABLET: 5; 325 TABLET ORAL at 19:07

## 2019-10-23 ASSESSMENT — ENCOUNTER SYMPTOMS
BACK PAIN: 0
SHORTNESS OF BREATH: 0
VOMITING: 0
DIARRHEA: 0

## 2019-10-23 ASSESSMENT — PAIN DESCRIPTION - LOCATION: LOCATION: KNEE

## 2019-10-23 ASSESSMENT — PAIN SCALES - GENERAL: PAINLEVEL_OUTOF10: 6

## 2019-10-28 ENCOUNTER — OFFICE VISIT (OUTPATIENT)
Dept: CARDIOLOGY CLINIC | Age: 54
End: 2019-10-28
Payer: MEDICARE

## 2019-10-28 VITALS
BODY MASS INDEX: 38.36 KG/M2 | DIASTOLIC BLOOD PRESSURE: 70 MMHG | WEIGHT: 315 LBS | HEIGHT: 76 IN | HEART RATE: 68 BPM | SYSTOLIC BLOOD PRESSURE: 104 MMHG | OXYGEN SATURATION: 95 %

## 2019-10-28 DIAGNOSIS — I10 ESSENTIAL HYPERTENSION: ICD-10-CM

## 2019-10-28 DIAGNOSIS — I50.32 CHF (CONGESTIVE HEART FAILURE), NYHA CLASS II, CHRONIC, DIASTOLIC (HCC): Primary | ICD-10-CM

## 2019-10-28 PROCEDURE — 99213 OFFICE O/P EST LOW 20 MIN: CPT | Performed by: NURSE PRACTITIONER

## 2019-10-28 ASSESSMENT — ENCOUNTER SYMPTOMS
SHORTNESS OF BREATH: 0
ABDOMINAL DISTENTION: 0
NAUSEA: 0
COLOR CHANGE: 0
ABDOMINAL PAIN: 0
COUGH: 0
WHEEZING: 0
CHEST TIGHTNESS: 0
APNEA: 0

## 2019-11-26 ENCOUNTER — TELEPHONE (OUTPATIENT)
Dept: CARDIOLOGY CLINIC | Age: 54
End: 2019-11-26

## 2019-12-01 ENCOUNTER — HOSPITAL ENCOUNTER (EMERGENCY)
Age: 54
Discharge: HOME | End: 2019-12-01
Payer: MEDICARE

## 2019-12-01 ENCOUNTER — APPOINTMENT (OUTPATIENT)
Dept: CT IMAGING | Age: 54
End: 2019-12-01
Payer: MEDICARE

## 2019-12-01 VITALS
TEMPERATURE: 98.3 F | DIASTOLIC BLOOD PRESSURE: 91 MMHG | SYSTOLIC BLOOD PRESSURE: 145 MMHG | RESPIRATION RATE: 16 BRPM | OXYGEN SATURATION: 97 % | HEART RATE: 78 BPM

## 2019-12-01 DIAGNOSIS — K52.9 GASTROENTERITIS: Primary | ICD-10-CM

## 2019-12-01 LAB
ALBUMIN SERPL-MCNC: 4.2 G/DL (ref 3.5–5.1)
ALP BLD-CCNC: 80 U/L (ref 38–126)
ALT SERPL-CCNC: 31 U/L (ref 11–66)
ANION GAP SERPL CALCULATED.3IONS-SCNC: 17 MEQ/L (ref 8–16)
AST SERPL-CCNC: 25 U/L (ref 5–40)
BACTERIA: ABNORMAL /HPF
BASOPHILS # BLD: 0.3 %
BASOPHILS ABSOLUTE: 0 THOU/MM3 (ref 0–0.1)
BILIRUB SERPL-MCNC: 0.8 MG/DL (ref 0.3–1.2)
BILIRUBIN DIRECT: < 0.2 MG/DL (ref 0–0.3)
BILIRUBIN URINE: NEGATIVE
BLOOD, URINE: ABNORMAL
BUN BLDV-MCNC: 22 MG/DL (ref 7–22)
CALCIUM SERPL-MCNC: 8.7 MG/DL (ref 8.5–10.5)
CASTS 2: ABNORMAL /LPF
CASTS UA: ABNORMAL /LPF
CHARACTER, URINE: CLEAR
CHLORIDE BLD-SCNC: 104 MEQ/L (ref 98–111)
CO2: 17 MEQ/L (ref 23–33)
COLOR: YELLOW
CREAT SERPL-MCNC: 0.9 MG/DL (ref 0.4–1.2)
CRYSTALS, UA: ABNORMAL
EOSINOPHIL # BLD: 1.8 %
EOSINOPHILS ABSOLUTE: 0.1 THOU/MM3 (ref 0–0.4)
EPITHELIAL CELLS, UA: ABNORMAL /HPF
ERYTHROCYTE [DISTWIDTH] IN BLOOD BY AUTOMATED COUNT: 16.3 % (ref 11.5–14.5)
ERYTHROCYTE [DISTWIDTH] IN BLOOD BY AUTOMATED COUNT: 49.6 FL (ref 35–45)
FLU A ANTIGEN: NEGATIVE
FLU B ANTIGEN: NEGATIVE
GLUCOSE BLD-MCNC: 109 MG/DL (ref 70–108)
GLUCOSE URINE: NEGATIVE MG/DL
HCT VFR BLD CALC: 45.9 % (ref 42–52)
HEMOGLOBIN: 14.3 GM/DL (ref 14–18)
IMMATURE GRANS (ABS): 0.02 THOU/MM3 (ref 0–0.07)
IMMATURE GRANULOCYTES: 0.3 %
KETONES, URINE: NEGATIVE
LEUKOCYTE ESTERASE, URINE: NEGATIVE
LIPASE: 16.2 U/L (ref 5.6–51.3)
LYMPHOCYTES # BLD: 20.9 %
LYMPHOCYTES ABSOLUTE: 1.5 THOU/MM3 (ref 1–4.8)
MAGNESIUM: 1.9 MG/DL (ref 1.6–2.4)
MCH RBC QN AUTO: 26.6 PG (ref 26–33)
MCHC RBC AUTO-ENTMCNC: 31.2 GM/DL (ref 32.2–35.5)
MCV RBC AUTO: 85.5 FL (ref 80–94)
MISCELLANEOUS 2: ABNORMAL
MONOCYTES # BLD: 8.9 %
MONOCYTES ABSOLUTE: 0.6 THOU/MM3 (ref 0.4–1.3)
NITRITE, URINE: NEGATIVE
NUCLEATED RED BLOOD CELLS: 0 /100 WBC
OSMOLALITY CALCULATION: 279.6 MOSMOL/KG (ref 275–300)
PH UA: 5 (ref 5–9)
PLATELET # BLD: 176 THOU/MM3 (ref 130–400)
PMV BLD AUTO: 11.1 FL (ref 9.4–12.4)
POTASSIUM SERPL-SCNC: 3.9 MEQ/L (ref 3.5–5.2)
PROTEIN UA: NEGATIVE
RBC # BLD: 5.37 MILL/MM3 (ref 4.7–6.1)
RBC URINE: ABNORMAL /HPF
RENAL EPITHELIAL, UA: ABNORMAL
SEG NEUTROPHILS: 67.8 %
SEGMENTED NEUTROPHILS ABSOLUTE COUNT: 4.9 THOU/MM3 (ref 1.8–7.7)
SODIUM BLD-SCNC: 138 MEQ/L (ref 135–145)
SPECIFIC GRAVITY, URINE: > 1.03 (ref 1–1.03)
TOTAL PROTEIN: 7.8 G/DL (ref 6.1–8)
TROPONIN T: < 0.01 NG/ML
UROBILINOGEN, URINE: 0.2 EU/DL (ref 0–1)
WBC # BLD: 7.2 THOU/MM3 (ref 4.8–10.8)
WBC UA: ABNORMAL /HPF
YEAST: ABNORMAL

## 2019-12-01 PROCEDURE — 6360000004 HC RX CONTRAST MEDICATION: Performed by: PHYSICIAN ASSISTANT

## 2019-12-01 PROCEDURE — 74177 CT ABD & PELVIS W/CONTRAST: CPT

## 2019-12-01 PROCEDURE — 85025 COMPLETE CBC W/AUTO DIFF WBC: CPT

## 2019-12-01 PROCEDURE — 36415 COLL VENOUS BLD VENIPUNCTURE: CPT

## 2019-12-01 PROCEDURE — 83690 ASSAY OF LIPASE: CPT

## 2019-12-01 PROCEDURE — 83735 ASSAY OF MAGNESIUM: CPT

## 2019-12-01 PROCEDURE — 2580000003 HC RX 258: Performed by: PHYSICIAN ASSISTANT

## 2019-12-01 PROCEDURE — 87804 INFLUENZA ASSAY W/OPTIC: CPT

## 2019-12-01 PROCEDURE — 81001 URINALYSIS AUTO W/SCOPE: CPT

## 2019-12-01 PROCEDURE — 82248 BILIRUBIN DIRECT: CPT

## 2019-12-01 PROCEDURE — 80053 COMPREHEN METABOLIC PANEL: CPT

## 2019-12-01 PROCEDURE — 99284 EMERGENCY DEPT VISIT MOD MDM: CPT

## 2019-12-01 PROCEDURE — 84484 ASSAY OF TROPONIN QUANT: CPT

## 2019-12-01 RX ORDER — LOPERAMIDE HYDROCHLORIDE 2 MG/1
2 CAPSULE ORAL 4 TIMES DAILY PRN
Qty: 20 CAPSULE | Refills: 0 | Status: SHIPPED | OUTPATIENT
Start: 2019-12-01 | End: 2019-12-06

## 2019-12-01 RX ORDER — DICYCLOMINE HYDROCHLORIDE 10 MG/1
10 CAPSULE ORAL EVERY 6 HOURS PRN
Qty: 20 CAPSULE | Refills: 0 | Status: SHIPPED | OUTPATIENT
Start: 2019-12-01

## 2019-12-01 RX ORDER — ONDANSETRON 4 MG/1
4 TABLET, ORALLY DISINTEGRATING ORAL EVERY 8 HOURS PRN
Qty: 20 TABLET | Refills: 0 | Status: SHIPPED | OUTPATIENT
Start: 2019-12-01

## 2019-12-01 RX ORDER — SODIUM CHLORIDE 9 MG/ML
INJECTION, SOLUTION INTRAVENOUS CONTINUOUS
Status: DISCONTINUED | OUTPATIENT
Start: 2019-12-01 | End: 2019-12-01 | Stop reason: HOSPADM

## 2019-12-01 RX ORDER — 0.9 % SODIUM CHLORIDE 0.9 %
500 INTRAVENOUS SOLUTION INTRAVENOUS ONCE
Status: DISCONTINUED | OUTPATIENT
Start: 2019-12-01 | End: 2019-12-01 | Stop reason: HOSPADM

## 2019-12-01 RX ADMIN — IOPAMIDOL 80 ML: 755 INJECTION, SOLUTION INTRAVENOUS at 12:58

## 2019-12-01 RX ADMIN — SODIUM CHLORIDE: 9 INJECTION, SOLUTION INTRAVENOUS at 12:32

## 2019-12-01 ASSESSMENT — ENCOUNTER SYMPTOMS
COLOR CHANGE: 0
SHORTNESS OF BREATH: 0
CONSTIPATION: 0
BACK PAIN: 0
ABDOMINAL PAIN: 1
NAUSEA: 0
VOMITING: 0

## 2019-12-04 ASSESSMENT — ENCOUNTER SYMPTOMS
BLOOD IN STOOL: 0
DIARRHEA: 1

## 2019-12-20 ENCOUNTER — NURSE ONLY (OUTPATIENT)
Dept: LAB | Age: 54
End: 2019-12-20

## 2019-12-20 DIAGNOSIS — I50.32 CHF (CONGESTIVE HEART FAILURE), NYHA CLASS II, CHRONIC, DIASTOLIC (HCC): ICD-10-CM

## 2019-12-20 LAB
ANION GAP SERPL CALCULATED.3IONS-SCNC: 12 MEQ/L (ref 8–16)
BUN BLDV-MCNC: 18 MG/DL (ref 7–22)
CALCIUM SERPL-MCNC: 8.6 MG/DL (ref 8.5–10.5)
CHLORIDE BLD-SCNC: 105 MEQ/L (ref 98–111)
CO2: 24 MEQ/L (ref 23–33)
CREAT SERPL-MCNC: 1 MG/DL (ref 0.4–1.2)
GFR SERPL CREATININE-BSD FRML MDRD: 78 ML/MIN/1.73M2
GFR SERPL CREATININE-BSD FRML MDRD: 88 ML/MIN/1.73M2
GLUCOSE BLD-MCNC: 107 MG/DL (ref 70–108)
POTASSIUM SERPL-SCNC: 4.5 MEQ/L (ref 3.5–5.2)
SODIUM BLD-SCNC: 141 MEQ/L (ref 135–145)

## 2019-12-23 ENCOUNTER — TELEPHONE (OUTPATIENT)
Dept: CARDIOLOGY CLINIC | Age: 54
End: 2019-12-23

## 2020-02-05 ENCOUNTER — OFFICE VISIT (OUTPATIENT)
Dept: CARDIOLOGY CLINIC | Age: 55
End: 2020-02-05
Payer: MEDICARE

## 2020-02-05 VITALS
WEIGHT: 315 LBS | HEIGHT: 76 IN | SYSTOLIC BLOOD PRESSURE: 138 MMHG | HEART RATE: 80 BPM | DIASTOLIC BLOOD PRESSURE: 70 MMHG | BODY MASS INDEX: 38.36 KG/M2

## 2020-02-05 PROCEDURE — 93000 ELECTROCARDIOGRAM COMPLETE: CPT | Performed by: NUCLEAR MEDICINE

## 2020-02-05 PROCEDURE — 99213 OFFICE O/P EST LOW 20 MIN: CPT | Performed by: NUCLEAR MEDICINE

## 2020-02-05 NOTE — PROGRESS NOTES
[address] [facility],[address] [address]  Dept: [phone]  Dept Fax: [phone]  Loc: [phone]    Visit Date: 2/5/2020    Carin Kevin is a 54 y.o. male who presents todayfor:  Chief Complaint   Patient presents with    1 Year Follow Up    Cardiomyopathy    Hypertension   known CMP and HTN   No chest pain  No changes in breathing  Severe obesity   Does have baseline dyspnea  Follows with CHF clinic  BP is stable  No dizziness  no syncope        HPI:  HPI  Past Medical History:   Diagnosis Date    Anxiety     Arrhythmia     Arthritis     CAD (coronary artery disease)     Chest pain     HX OF:    CHF (congestive heart failure) (HCC)     Chronic back pain     COPD (chronic obstructive pulmonary disease) (HCC)     Depression     Diverticula of colon     Fatigue     Heart attack (HCC)     Hyperlipidemia     Hypertension     Panic attacks     Pneumonia     Sleep apnea     SOB (shortness of breath)     Spondylosis     sponylolisthesis L5    Swelling       Past Surgical History:   Procedure Laterality Date    CARDIAC CATHETERIZATION  01/30/2008    EF 20%    CARDIOVASCULAR STRESS TEST  02/16/2009    EF 52%    PRE-MALIGNANT / BENIGN SKIN LESION EXCISION  5/17/12    mole on abd-left arm-Dr. Chaudhry Ek    TRANSTHORACIC ECHOCARDIOGRAM  07/22/2011    EF 55-65%     Family History   Problem Relation Age of Onset    High Cholesterol Father     High Blood Pressure Father     Coronary Art Dis Father     Cancer Maternal Grandmother         colon     Social History     Tobacco Use    Smoking status: Passive Smoke Exposure - Never Smoker    Smokeless tobacco: Never Used   Substance Use Topics    Alcohol use: No     Alcohol/week: 0.0 standard drinks     Comment: quit drinking       Current Outpatient Medications   Medication Sig Dispense Refill    dicyclomine (BENTYL) 10 MG capsule Take 1 capsule by mouth every 6 hours as needed (cramps) 20 capsule 0    ondansetron (ZOFRAN ODT) 4 MG disintegrating tablet Take 1 tablet by mouth every 8 hours as needed for Nausea 20 tablet 0    acetaminophen (APAP EXTRA STRENGTH) 500 MG tablet Take 1 tablet by mouth every 6 hours as needed for Pain 120 tablet 0    traMADol (ULTRAM) 50 MG tablet Take 50 mg by mouth every 8 hours as needed for Pain. Wilman Leyvaem  umeclidinium-vilanterol (ANORO ELLIPTA) 62.5-25 MCG/INH AEPB inhaler Inhale 1 puff into the lungs daily      metFORMIN (GLUCOPHAGE-XR) 500 MG extended release tablet Take 500 mg by mouth daily (with breakfast)      ibuprofen (ADVIL;MOTRIN) 800 MG tablet Take 0.5 tablets by mouth every 6 hours as needed for Pain (Patient taking differently: Take 800 mg by mouth every 8 hours as needed for Pain ) 40 tablet 0    lisinopril (PRINIVIL;ZESTRIL) 10 MG tablet Take 1 tablet by mouth daily 30 tablet 11    allopurinol (ZYLOPRIM) 300 MG tablet Take 1 tablet by mouth daily 30 tablet 5    atorvastatin (LIPITOR) 20 MG tablet TAKE 1 TABLET BY MOUTH ONE TIME A DAY 30 tablet 5    bumetanide (BUMEX) 0.5 MG tablet Take 2 tablets daily. If weight gain 3Ibs in 1 day or 5Ibs in 1 week, take 2 tablets in AM and PM as needed. 60 tablet 5    carvedilol (COREG) 25 MG tablet Take 1 tablet by mouth 2 times daily 60 tablet 5    ranitidine (ZANTAC) 150 MG tablet TAKE ONE TABLET BY MOUTH TWICE DAILY 60 tablet 5    spironolactone (ALDACTONE) 25 MG tablet Take 0.5 tablets by mouth daily 15 tablet 5    meclizine (ANTIVERT) 25 MG tablet Take 1 tablet by mouth 3 times daily as needed 60 tablet 5    carBAMazepine (TEGRETOL) 200 MG tablet Take 200 mg by mouth 2 times daily       Blood Pressure KIT This is a prescription for a blood pressure cuff for at home use. 1 kit 0    risperiDONE (RISPERDAL) 0.5 MG tablet Take 0.5 mg by mouth 2 times daily       citalopram (CELEXA) 20 MG tablet Take 1 tablet by mouth daily.  30 tablet 5    aspirin EC 81 MG EC tablet Take 81 mg by mouth findings      Plan:  No follow-ups on file. As above  Continue risk factor modification and medical management  Thank you for allowing me to participate in the care of your patient. Please don't hesitate to contact me regarding any further issues related to the patient care    Orders Placed:  Orders Placed This Encounter   Procedures    EKG 12 Lead     Order Specific Question:   Reason for Exam?     Answer: Other    EKG 12 Lead     Order Specific Question:   Reason for Exam?     Answer: Other       Medications Prescribed:  No orders of the defined types were placed in this encounter. Discussed use, benefit, and side effects of prescribed medications. All patient questions answered. Pt voicedunderstanding. Instructed to continue current medications, diet and exercise. Continue risk factor modification and medical management. Patient agreed with treatment plan. Follow up as directed.     Electronically signedby Cecelia Nice MD on 2/5/2020 at 9:35 AM

## 2020-02-12 LAB
BUN BLDV-MCNC: 18 MG/DL
CALCIUM SERPL-MCNC: 0.9 MG/DL
CHLORIDE BLD-SCNC: 102 MMOL/L
CO2: 28 MMOL/L
CREAT SERPL-MCNC: 0.9 MG/DL
GFR CALCULATED: 60
GLUCOSE BLD-MCNC: 86 MG/DL
POTASSIUM SERPL-SCNC: 4 MMOL/L
SODIUM BLD-SCNC: 137 MMOL/L

## 2020-04-15 ENCOUNTER — TELEPHONE (OUTPATIENT)
Dept: CARDIOLOGY CLINIC | Age: 55
End: 2020-04-15

## 2020-04-15 NOTE — TELEPHONE ENCOUNTER
Patient unable to do VV  States does not use mychart account  Appointment r/s    Reviewed the HF zones   Instructed when to call the office,  Stressed important to take daily weights. Patient verbalized understanding.

## 2020-05-28 ENCOUNTER — TELEPHONE (OUTPATIENT)
Dept: CARDIOLOGY CLINIC | Age: 55
End: 2020-05-28

## 2020-05-28 NOTE — TELEPHONE ENCOUNTER
Called to patient regarding missed appointment on 5/28 with Agueda Macdonald  No answer, unable to leave message   Emergency contact # no longer in service

## 2020-05-28 NOTE — LETTER
[facility]  [facility]  [address] [place] [id number]  Phone: [phone]  Fax: [phone]    MARCO Barrientos CNP      Ilene 10, 2020    [address] [facility] [facility] [address]  [address] [address]      Dear Ana Teresa: We tried to contact you in regards to your appointment with our office on May 27 that we missed you at. Your health and medical care are important to us. Please call our office back to reschedule this appointment at [phone]. If you have any questions or concerns, please don't hesitate to call.     Sincerely,        MARCO Barrientos CNP

## 2020-06-10 NOTE — TELEPHONE ENCOUNTER
Left message for patient to call office   No answer and left message to call office   Letter mailed to patient   Note put on Dr. Hughes Core appointment

## 2020-08-11 ENCOUNTER — OFFICE VISIT (OUTPATIENT)
Dept: CARDIOLOGY CLINIC | Age: 55
End: 2020-08-11
Payer: MEDICARE

## 2020-08-11 VITALS
DIASTOLIC BLOOD PRESSURE: 60 MMHG | WEIGHT: 315 LBS | SYSTOLIC BLOOD PRESSURE: 106 MMHG | HEART RATE: 94 BPM | HEIGHT: 76 IN | BODY MASS INDEX: 38.36 KG/M2 | OXYGEN SATURATION: 94 %

## 2020-08-11 PROCEDURE — 99214 OFFICE O/P EST MOD 30 MIN: CPT | Performed by: NURSE PRACTITIONER

## 2020-08-11 RX ORDER — ACETAMINOPHEN 160 MG
TABLET,DISINTEGRATING ORAL 2 TIMES DAILY
COMMUNITY
End: 2021-10-07

## 2020-08-11 ASSESSMENT — ENCOUNTER SYMPTOMS
ABDOMINAL PAIN: 0
NAUSEA: 0
COUGH: 0
WHEEZING: 0
COLOR CHANGE: 0
ABDOMINAL DISTENTION: 0
SHORTNESS OF BREATH: 0
APNEA: 0
CHEST TIGHTNESS: 0

## 2020-08-11 NOTE — PROGRESS NOTES
Family History   Problem Relation Age of Onset    High Cholesterol Father     High Blood Pressure Father     Coronary Art Dis Father     Cancer Maternal Grandmother         colon     Social History     Tobacco Use    Smoking status: Passive Smoke Exposure - Never Smoker    Smokeless tobacco: Never Used   Substance Use Topics    Alcohol use: No     Alcohol/week: 0.0 standard drinks     Comment: quit drinking      Current Outpatient Medications   Medication Sig Dispense Refill    Cholecalciferol (VITAMIN D3) 50 MCG (2000 UT) CAPS Take by mouth 2 times daily      Folic Acid (FOLATE PO) Take by mouth 2 times daily 1333mcg tablets      acetaminophen (APAP EXTRA STRENGTH) 500 MG tablet Take 1 tablet by mouth every 6 hours as needed for Pain 120 tablet 0    traMADol (ULTRAM) 50 MG tablet Take 50 mg by mouth every 8 hours as needed for Pain. Ricka Distad  umeclidinium-vilanterol (ANORO ELLIPTA) 62.5-25 MCG/INH AEPB inhaler Inhale 1 puff into the lungs daily      metFORMIN (GLUCOPHAGE-XR) 500 MG extended release tablet Take 500 mg by mouth daily (with breakfast)      ibuprofen (ADVIL;MOTRIN) 800 MG tablet Take 0.5 tablets by mouth every 6 hours as needed for Pain (Patient taking differently: Take 800 mg by mouth every 8 hours as needed for Pain ) 40 tablet 0    lisinopril (PRINIVIL;ZESTRIL) 10 MG tablet Take 1 tablet by mouth daily 30 tablet 11    allopurinol (ZYLOPRIM) 300 MG tablet Take 1 tablet by mouth daily 30 tablet 5    atorvastatin (LIPITOR) 20 MG tablet TAKE 1 TABLET BY MOUTH ONE TIME A DAY 30 tablet 5    bumetanide (BUMEX) 0.5 MG tablet Take 2 tablets daily. If weight gain 3Ibs in 1 day or 5Ibs in 1 week, take 2 tablets in AM and PM as needed.  60 tablet 5    carvedilol (COREG) 25 MG tablet Take 1 tablet by mouth 2 times daily 60 tablet 5    spironolactone (ALDACTONE) 25 MG tablet Take 0.5 tablets by mouth daily 15 tablet 5    meclizine (ANTIVERT) 25 MG tablet Take 1 tablet by mouth 3 times daily as velocity was within the normal range with no   evidence for mitral stenosis. There was no evidence of mitral   regurgitation. Aortic Valve   The aortic valve was trileaflet with normal thickness and cuspal   separation. DOPPLER: Transaortic velocity was within the normal range with   no evidence of aortic stenosis. There was no evidence of aortic   regurgitation. Tricuspid Valve   The tricuspid valve structure was normal with normal leaflet separation. DOPPLER: There was no evidence of tricuspid stenosis. There was no   evidence of tricuspid regurgitation. Pulmonic Valve   The pulmonic valve was not well visualized . Left Atrium   Left atrial size was normal.      Left Ventricle   Ejection fraction is visually estimated at 55%. Overall left ventricular function is normal.      Right Atrium   Right atrial size was normal.      Right Ventricle   Mildly dilated right ventricle. Pericardial Effusion   The pericardium was normal in appearance with no evidence of a pericardial   effusion. Pleural Effusion   No evidence of pleural effusion. Aorta / Great Vessels   -Aortic root dimension within normal limits.   -The Pulmonary artery is within normal limits. -IVC size is within normal limits with normal respiratory phasic changes.          Results reviewed:  BNP:   Lab Results   Component Value Date    BNP 11.7 02/16/2014    PROBNP 28.4 06/27/2016     CBC:   Lab Results   Component Value Date    WBC 7.2 12/01/2019    RBC 5.37 12/01/2019    RBC 4.76 09/14/2011    HGB 14.3 12/01/2019    HCT 45.9 12/01/2019     12/01/2019     CMP:    Lab Results   Component Value Date     12/20/2019    K 4.5 12/20/2019     12/20/2019    CO2 24 12/20/2019    BUN 18 12/20/2019    CREATININE 1.0 12/20/2019    LABGLOM 78 12/20/2019    GLUCOSE 107 12/20/2019    GLUCOSE 107 05/16/2012    CALCIUM 8.6 12/20/2019     Hepatic Function Panel:    Lab Results   Component Value Date    ALKPHOS 80 12/01/2019    ALT 31 12/01/2019    AST 25 12/01/2019    PROT 7.8 12/01/2019    BILITOT 0.8 12/01/2019    BILIDIR <0.2 12/01/2019    LABALBU 4.2 12/01/2019    LABALBU 4.4 09/14/2011     Magnesium:    Lab Results   Component Value Date    MG 1.9 12/01/2019     PT/INR:    Lab Results   Component Value Date    INR 0.98 04/21/2014     Lipids:    Lab Results   Component Value Date    TRIG 89 08/02/2019    HDL 41 08/02/2019    LDLCALC 73 08/02/2019       ASSESSMENT AND PLAN:   The patient's condition/symptoms are Stable      Diagnosis Orders   1. CHF (congestive heart failure), NYHA class II, chronic, diastolic (HCC)  Basic Metabolic Panel   2. Essential hypertension     3. Dilated cardiomyopathy ([facility] [address]     [facility]. [facility]         Plan:  · GDMT   · ACE/ARB/ARNi: Lisinopril 10 mg daily  · Beta Blocker: Coreg 25 mg bid  · Aldosterone antagonist: Aldactone 12.5 mg daily  · Diuretic/Potassium: Bumex 1 mg daily  · Hydralazine/Nitrate: no  · Other: Lipitor, ASA 81 mg  · No signs of fluid overload. BMP next month. We discussed doing more activity, walking his dog. We discussed weight loss strategies. We reviewed HF Zones. Labs from Feb 2020 reviewed. · HF Zones reviewed. · Daily weights  · Fluid restriction of 2 Liters per day (64 oz)   · Limit sodium in diet to around 8872-8494 mg/day  · Monitor BP  · Activity as tolerated     Patient was instructed to call the [facility] for changes in the following symptoms:    Weight gain of 3 pounds in 1 day or 5 pounds in 1 week   Increased shortness of breath   Shortness of breath while laying down   Cough   Chest pain   Swelling in feet, ankles or legs   Tenderness or bloating in the abdomen   Fatigue    Decreased appetite or feeling \"full\"   Nausea    Confusion      Return in about 9 months (around 5/11/2021). or sooner if needed     Patient given educational materials - see patient instructions.      We discussed the importance of weighing oneself and recording daily. We also discussed the importance of a low sodium diet, higher sodium foods to avoid and appropriate low sodium food choices. Discussed use, benefit, and side effects of prescribed medications. All patient questions answered. Patient verbalizes understanding of plan of care using teach back method, and is agreeable to the treatment plan. Greater then 30 minutes of time was spent reviewing the chart and educating the patient about HF, medications, diet, exercise, and discussing the plan of care. I personally spent more then 50% of the appt time face to face with the patient counseling/coordinating patient's care.       Electronicallysigned by MARCO Damian CNP on 8/11/2020 at 9:38 AM

## 2020-09-23 ENCOUNTER — NURSE ONLY (OUTPATIENT)
Dept: LAB | Age: 55
End: 2020-09-23

## 2020-09-23 LAB
ANION GAP SERPL CALCULATED.3IONS-SCNC: 10 MEQ/L (ref 8–16)
BUN BLDV-MCNC: 19 MG/DL (ref 7–22)
CALCIUM SERPL-MCNC: 9 MG/DL (ref 8.5–10.5)
CHLORIDE BLD-SCNC: 101 MEQ/L (ref 98–111)
CO2: 27 MEQ/L (ref 23–33)
CREAT SERPL-MCNC: 1 MG/DL (ref 0.4–1.2)
GLUCOSE BLD-MCNC: 110 MG/DL (ref 70–108)
POTASSIUM SERPL-SCNC: 4.1 MEQ/L (ref 3.5–5.2)
SODIUM BLD-SCNC: 138 MEQ/L (ref 135–145)

## 2020-09-24 ENCOUNTER — TELEPHONE (OUTPATIENT)
Dept: CARDIOLOGY CLINIC | Age: 55
End: 2020-09-24

## 2020-10-08 ENCOUNTER — TELEPHONE (OUTPATIENT)
Dept: PULMONOLOGY | Age: 55
End: 2020-10-08

## 2020-10-13 ENCOUNTER — HOSPITAL ENCOUNTER (EMERGENCY)
Age: 55
Discharge: HOME | End: 2020-10-13
Payer: MEDICARE

## 2020-10-13 VITALS
SYSTOLIC BLOOD PRESSURE: 139 MMHG | RESPIRATION RATE: 16 BRPM | OXYGEN SATURATION: 98 % | DIASTOLIC BLOOD PRESSURE: 85 MMHG | HEART RATE: 70 BPM | TEMPERATURE: 98.3 F

## 2020-10-13 LAB
ALBUMIN SERPL-MCNC: 4.1 G/DL (ref 3.5–5.1)
ALP BLD-CCNC: 84 U/L (ref 38–126)
ALT SERPL-CCNC: 29 U/L (ref 11–66)
ANION GAP SERPL CALCULATED.3IONS-SCNC: 12 MEQ/L (ref 8–16)
AST SERPL-CCNC: 23 U/L (ref 5–40)
BASOPHILS # BLD: 0.5 %
BASOPHILS ABSOLUTE: 0 THOU/MM3 (ref 0–0.1)
BILIRUB SERPL-MCNC: 0.8 MG/DL (ref 0.3–1.2)
BILIRUBIN URINE: NEGATIVE
BLOOD, URINE: NEGATIVE
BUN BLDV-MCNC: 18 MG/DL (ref 7–22)
CALCIUM SERPL-MCNC: 9.2 MG/DL (ref 8.5–10.5)
CHARACTER, URINE: CLEAR
CHLORIDE BLD-SCNC: 101 MEQ/L (ref 98–111)
CO2: 27 MEQ/L (ref 23–33)
COLOR: YELLOW
CREAT SERPL-MCNC: 0.9 MG/DL (ref 0.4–1.2)
EOSINOPHIL # BLD: 2.1 %
EOSINOPHILS ABSOLUTE: 0.1 THOU/MM3 (ref 0–0.4)
ERYTHROCYTE [DISTWIDTH] IN BLOOD BY AUTOMATED COUNT: 15.1 % (ref 11.5–14.5)
ERYTHROCYTE [DISTWIDTH] IN BLOOD BY AUTOMATED COUNT: 49.1 FL (ref 35–45)
GLUCOSE BLD-MCNC: 93 MG/DL (ref 70–108)
GLUCOSE URINE: NEGATIVE MG/DL
HCT VFR BLD CALC: 40.4 % (ref 42–52)
HEMOGLOBIN: 13.1 GM/DL (ref 14–18)
IMMATURE GRANS (ABS): 0.01 THOU/MM3 (ref 0–0.07)
IMMATURE GRANULOCYTES: 0.2 %
KETONES, URINE: NEGATIVE
LEUKOCYTE ESTERASE, URINE: NEGATIVE
LYMPHOCYTES # BLD: 36.2 %
LYMPHOCYTES ABSOLUTE: 1.6 THOU/MM3 (ref 1–4.8)
MCH RBC QN AUTO: 28.9 PG (ref 26–33)
MCHC RBC AUTO-ENTMCNC: 32.4 GM/DL (ref 32.2–35.5)
MCV RBC AUTO: 89 FL (ref 80–94)
MONOCYTES # BLD: 11.5 %
MONOCYTES ABSOLUTE: 0.5 THOU/MM3 (ref 0.4–1.3)
NITRITE, URINE: NEGATIVE
NUCLEATED RED BLOOD CELLS: 0 /100 WBC
OSMOLALITY CALCULATION: 281 MOSMOL/KG (ref 275–300)
PH UA: 6.5 (ref 5–9)
PLATELET # BLD: 143 THOU/MM3 (ref 130–400)
PMV BLD AUTO: 11.6 FL (ref 9.4–12.4)
POTASSIUM SERPL-SCNC: 4.5 MEQ/L (ref 3.5–5.2)
PROTEIN UA: NEGATIVE
RBC # BLD: 4.54 MILL/MM3 (ref 4.7–6.1)
SEG NEUTROPHILS: 49.5 %
SEGMENTED NEUTROPHILS ABSOLUTE COUNT: 2.1 THOU/MM3 (ref 1.8–7.7)
SODIUM BLD-SCNC: 140 MEQ/L (ref 135–145)
SPECIFIC GRAVITY, URINE: 1.02 (ref 1–1.03)
TOTAL PROTEIN: 7.4 G/DL (ref 6.1–8)
UROBILINOGEN, URINE: 1 EU/DL (ref 0–1)
WBC # BLD: 4.3 THOU/MM3 (ref 4.8–10.8)

## 2020-10-13 PROCEDURE — 99285 EMERGENCY DEPT VISIT HI MDM: CPT

## 2020-10-13 PROCEDURE — 36415 COLL VENOUS BLD VENIPUNCTURE: CPT

## 2020-10-13 PROCEDURE — 6360000002 HC RX W HCPCS: Performed by: PHYSICIAN ASSISTANT

## 2020-10-13 PROCEDURE — 99284 EMERGENCY DEPT VISIT MOD MDM: CPT

## 2020-10-13 PROCEDURE — 96374 THER/PROPH/DIAG INJ IV PUSH: CPT

## 2020-10-13 PROCEDURE — 80053 COMPREHEN METABOLIC PANEL: CPT

## 2020-10-13 PROCEDURE — 81003 URINALYSIS AUTO W/O SCOPE: CPT

## 2020-10-13 PROCEDURE — 85025 COMPLETE CBC W/AUTO DIFF WBC: CPT

## 2020-10-13 PROCEDURE — 96375 TX/PRO/DX INJ NEW DRUG ADDON: CPT

## 2020-10-13 RX ORDER — KETOROLAC TROMETHAMINE 30 MG/ML
30 INJECTION, SOLUTION INTRAMUSCULAR; INTRAVENOUS ONCE
Status: COMPLETED | OUTPATIENT
Start: 2020-10-13 | End: 2020-10-13

## 2020-10-13 RX ORDER — ORPHENADRINE CITRATE 100 MG/1
100 TABLET, EXTENDED RELEASE ORAL 2 TIMES DAILY
Qty: 14 TABLET | Refills: 0 | Status: SHIPPED | OUTPATIENT
Start: 2020-10-13 | End: 2021-05-11

## 2020-10-13 RX ORDER — KETOROLAC TROMETHAMINE 10 MG/1
10 TABLET, FILM COATED ORAL EVERY 6 HOURS PRN
Qty: 15 TABLET | Refills: 0 | Status: SHIPPED | OUTPATIENT
Start: 2020-10-13 | End: 2021-10-07

## 2020-10-13 RX ORDER — ORPHENADRINE CITRATE 30 MG/ML
60 INJECTION INTRAMUSCULAR; INTRAVENOUS ONCE
Status: COMPLETED | OUTPATIENT
Start: 2020-10-13 | End: 2020-10-13

## 2020-10-13 RX ORDER — DEXAMETHASONE SODIUM PHOSPHATE 4 MG/ML
10 INJECTION, SOLUTION INTRA-ARTICULAR; INTRALESIONAL; INTRAMUSCULAR; INTRAVENOUS; SOFT TISSUE ONCE
Status: COMPLETED | OUTPATIENT
Start: 2020-10-13 | End: 2020-10-13

## 2020-10-13 RX ADMIN — ORPHENADRINE CITRATE 60 MG: 30 INJECTION INTRAMUSCULAR; INTRAVENOUS at 09:41

## 2020-10-13 RX ADMIN — DEXAMETHASONE SODIUM PHOSPHATE 10 MG: 4 INJECTION, SOLUTION INTRAMUSCULAR; INTRAVENOUS at 11:12

## 2020-10-13 RX ADMIN — KETOROLAC TROMETHAMINE 30 MG: 30 INJECTION, SOLUTION INTRAMUSCULAR; INTRAVENOUS at 09:41

## 2020-10-13 ASSESSMENT — PAIN SCALES - GENERAL
PAINLEVEL_OUTOF10: 10
PAINLEVEL_OUTOF10: 10

## 2020-10-13 ASSESSMENT — ENCOUNTER SYMPTOMS
NAUSEA: 0
ABDOMINAL PAIN: 0
VOMITING: 0
COUGH: 0
BACK PAIN: 1
RHINORRHEA: 0
DIARRHEA: 0
SHORTNESS OF BREATH: 0
PHOTOPHOBIA: 0

## 2020-10-13 ASSESSMENT — PAIN DESCRIPTION - ORIENTATION: ORIENTATION: LEFT

## 2020-10-13 ASSESSMENT — PAIN DESCRIPTION - DESCRIPTORS: DESCRIPTORS: ACHING

## 2020-10-13 ASSESSMENT — PAIN DESCRIPTION - LOCATION: LOCATION: FLANK

## 2020-10-13 ASSESSMENT — PAIN DESCRIPTION - PAIN TYPE: TYPE: ACUTE PAIN

## 2020-10-13 NOTE — ED PROVIDER NOTES
[facility] EMERGENCY DEPT      CHIEF COMPLAINT       Chief Complaint   Patient presents with    Flank Pain     left       Nurses Notes reviewed and I agree except as noted in the HPI. HISTORY OF PRESENT ILLNESS    Romell Ganser is a 54 y.o. male who presents for left low back pain. Patient has had symptoms for 2 weeks of left low back pain radiating to the left flank, left lower quadrant, and then down the left lateral aspect of the leg to his knee. He describes the pain as sharp and aching. He had this 1 time previously but could not remember his diagnosis. He denies injury. The pain is worse with changing positions and walking and decreased after he walks around a while. He is able to find a comfortable position. He denies nausea, vomiting, diarrhea, constipation, urinary symptoms, incontinence of bowel or bladder, paresthesias, fever, chills, or other complaints except as mentioned above in a 10 point review of systems. Location/Symptom: Left low back pain radiating to the flank, left lower quadrant, and down the left lower extremity  Timing/Onset: 2 weeks ago  Context/Setting: Spontaneous onset with no history of trauma  Quality: Sharp, aching  Duration: Constant  Modifying Factors: Worsened with changing position, certain movements, and starting to walk; improved after walking for a while or finding a comfortable position  Severity: Mild to moderate    REVIEW OF SYSTEMS     Review of Systems   Constitutional: Negative for activity change, chills and fever. HENT: Negative for congestion, ear pain and rhinorrhea. Eyes: Negative for photophobia. Respiratory: Negative for cough and shortness of breath. Cardiovascular: Negative for chest pain. Gastrointestinal: Negative for abdominal pain, diarrhea, nausea and vomiting. No incontinence of bowel    Endocrine: Negative for polyuria. Genitourinary: Negative for difficulty urinating, dysuria and frequency.         No incontinence of bladder   Musculoskeletal: Positive for back pain. Negative for gait problem and neck pain. Skin: Negative for rash. Neurological: Negative for weakness and numbness. Psychiatric/Behavioral: Negative for confusion and sleep disturbance. PAST MEDICAL HISTORY    has a past medical history of Anxiety, Arrhythmia, Arthritis, CAD (coronary artery disease), Chest pain, CHF (congestive heart failure) ([facility] [address] Chronic back pain, COPD (chronic obstructive pulmonary disease) [facility] Depression, Diverticula of colon, Fatigue, Heart attack ([facility] Hyperlipidemia, Hypertension, Panic attacks, Pneumonia, Sleep apnea, SOB (shortness of breath), Spondylosis, and Swelling. SURGICAL HISTORY      has a past surgical history that includes Cardiac catheterization (01/30/2008); cardiovascular stress test (02/16/2009); transthoracic echocardiogram (07/22/2011); and pre-malignant / benign skin lesion excision (5/17/12). CURRENT MEDICATIONS       Previous Medications    ACETAMINOPHEN (APAP EXTRA STRENGTH) 500 MG TABLET    Take 1 tablet by mouth every 6 hours as needed for Pain    ALLOPURINOL (ZYLOPRIM) 300 MG TABLET    Take 1 tablet by mouth daily    ASPIRIN EC 81 MG EC TABLET    Take 81 mg by mouth daily. With food; blood thinner    ATORVASTATIN (LIPITOR) 20 MG TABLET    TAKE 1 TABLET BY MOUTH ONE TIME A DAY    BLOOD PRESSURE KIT    This is a prescription for a blood pressure cuff for at home use. BUMETANIDE (BUMEX) 0.5 MG TABLET    Take 2 tablets daily. If weight gain 3Ibs in 1 day or 5Ibs in 1 week, take 2 tablets in AM and PM as needed. CARBAMAZEPINE (TEGRETOL) 200 MG TABLET    Take 200 mg by mouth 2 times daily     CARVEDILOL (COREG) 25 MG TABLET    Take 1 tablet by mouth 2 times daily    CHOLECALCIFEROL (VITAMIN D3) 50 MCG (2000 UT) CAPS    Take by mouth 2 times daily    CITALOPRAM (CELEXA) 20 MG TABLET    Take 1 tablet by mouth daily.     DICYCLOMINE (BENTYL) 10 MG CAPSULE    Take 1 capsule by mouth every 6 hours as needed (cramps)    FOLIC ACID (FOLATE PO)    Take by mouth 2 times daily 1333mcg tablets    LISINOPRIL (PRINIVIL;ZESTRIL) 10 MG TABLET    Take 1 tablet by mouth daily    MECLIZINE (ANTIVERT) 25 MG TABLET    Take 1 tablet by mouth 3 times daily as needed    METFORMIN (GLUCOPHAGE-XR) 500 MG EXTENDED RELEASE TABLET    Take 500 mg by mouth daily (with breakfast)    ONDANSETRON (ZOFRAN ODT) 4 MG DISINTEGRATING TABLET    Take 1 tablet by mouth every 8 hours as needed for Nausea    RANITIDINE (ZANTAC) 150 MG TABLET    TAKE ONE TABLET BY MOUTH TWICE DAILY    RISPERIDONE (RISPERDAL) 0.5 MG TABLET    Take 0.5 mg by mouth 2 times daily     SPIRONOLACTONE (ALDACTONE) 25 MG TABLET    Take 0.5 tablets by mouth daily    TRAMADOL (ULTRAM) 50 MG TABLET    Take 50 mg by mouth every 8 hours as needed for Pain. Shaaron Money UMECLIDINIUM-VILANTEROL (ANORO ELLIPTA) 62.5-25 MCG/INH AEPB INHALER    Inhale 1 puff into the lungs daily       ALLERGIES     is allergic to losartan potassium and mobic [meloxicam]. FAMILY HISTORY     He indicated that his mother is alive. He indicated that his father is . He indicated that his maternal grandmother is . family history includes Cancer in his maternal grandmother; Coronary Art Dis in his father; High Blood Pressure in his father; High Cholesterol in his father. SOCIAL HISTORY    reports that he is a non-smoker but has been exposed to tobacco smoke. He has never used smokeless tobacco. He reports that he does not drink alcohol or use drugs. PHYSICAL EXAM     INITIAL VITALS:  oral temperature is 98.3 °F (36.8 °C). His blood pressure is 154/96 (abnormal) and his pulse is 74. His respiration is 18 and oxygen saturation is 97%. Physical Exam  Vitals signs and nursing note reviewed. Constitutional:       General: He is not in acute distress. Appearance: Normal appearance. He is well-developed. He is morbidly obese. He is not toxic-appearing or diaphoretic.    HENT:      Head: Normocephalic and atraumatic. Right Ear: Hearing normal.      Left Ear: Hearing normal.      Nose: Nose normal. No rhinorrhea. Mouth/Throat:      Pharynx: Uvula midline. Eyes:      Conjunctiva/sclera: Conjunctivae normal.      Pupils: Pupils are equal, round, and reactive to light. Neck:      Musculoskeletal: No neck rigidity. Vascular: No JVD. Trachea: No tracheal deviation. Cardiovascular:      Rate and Rhythm: Normal rate and regular rhythm. Pulses:           Dorsalis pedis pulses are 2+ on the right side and 2+ on the left side. Posterior tibial pulses are 2+ on the right side and 2+ on the left side. Heart sounds: Normal heart sounds. Pulmonary:      Effort: Pulmonary effort is normal. No respiratory distress. Breath sounds: Normal breath sounds. No decreased breath sounds or wheezing. Abdominal:      General: There is no distension. Palpations: Abdomen is not rigid. There is no pulsatile mass. Tenderness: There is no abdominal tenderness. There is no guarding or rebound. Hernia: No hernia is present. Musculoskeletal: Normal range of motion. Lumbar back: He exhibits tenderness (Left lower paraspinal and over the left SI joint. Left flank was tender as well). He exhibits normal range of motion and no bony tenderness. Back:       Comments: Well perfused; good strength appreciated in all muscle groups; there is good plantar and dorsiflexion of the feet and toes. SLR is negative in the supine position. Lymphadenopathy:      Cervical: No cervical adenopathy. Skin:     General: Skin is warm and dry. Coloration: Skin is not pale. Findings: No rash. Neurological:      Mental Status: He is alert. GCS: GCS eye subscore is 4. GCS verbal subscore is 5. GCS motor subscore is 6. Sensory: No sensory deficit. Coordination: Coordination normal.      Gait: Gait normal.      Comments: There is no saddle anesthesia. Psychiatric:         Speech: Speech normal.         Behavior: Behavior normal. Behavior is cooperative. Thought Content: Thought content normal.         DIFFERENTIAL DIAGNOSIS:   Including but not limited to: Degenerative disc disease, lumbosacral strain, lumbar radiculopathy, less likely but considered ureterolithiasis, diverticulitis    DIAGNOSTIC RESULTS     EKG: All EKG's are interpreted by [facility] Department Physician who either signs or Co-signs this chart in the absence of a cardiologist.  None    RADIOLOGY: non-plain film images(s) such as CT,Ultrasound and MRI are read by the radiologist.  Plain radiographic images are visualized and preliminarily interpreted by the emergency physician unless otherwise stated below. No orders to display       LABS:   Labs Reviewed   CBC WITH AUTO DIFFERENTIAL - Abnormal; Notable for the following components:       Result Value    WBC 4.3 (*)     RBC 4.54 (*)     Hemoglobin 13.1 (*)     Hematocrit 40.4 (*)     RDW-CV 15.1 (*)     RDW-SD 49.1 (*)     All other components within normal limits   COMPREHENSIVE METABOLIC PANEL   URINE RT REFLEX TO CULTURE   ANION GAP   GLOMERULAR FILTRATION RATE, ESTIMATED   OSMOLALITY       EMERGENCY DEPARTMENT COURSE:   Vitals:    Vitals:    10/13/20 0914   BP: (!) 154/96   Pulse: 74   Resp: 18   Temp: 98.3 °F (36.8 °C)   TempSrc: Oral   SpO2: 97%       MDM:  The patient was seen and evaluated within the ED today for left low back pain radiating to the left lower extremity. On exam, I appreciated reproducible tenderness to the left paraspinal lower lumbar area extending to the left SI joint and flank. The patient was neurovascularly intact. Old records were reviewed. Within the department, I observed the patient's vital signs to be within acceptable range. Radiological studies were discussed with the patient. Patient symptoms were not consistent with kidney stone, but he informed me he was worried about his kidneys.   We discussed lab work and although CT was not warranted that was offered. Patient declined CT scan at this time. Laboratory work was reassuring. Within the department, the patient was treated with Toradol and Norflex. I observed the patient's condition to modestly improve during the duration of their stay. The patient remained stable in the ER with good vital signs, neurovascularly intact, and no distress evident. Patient demonstrated a steady, independent gait. I have considered cauda equina, ureterolithiasis and this patient's presentation is not consistent with such entities and therefore, no further testing was warranted. The patient was comfortable with the plan of discharge home and to follow up with Dr. Casey Beltran. Anticipatory guidance was given. The patient was instructed to return to the emergency department for any worsening of their symptoms. Patient was discharged from the emergency department in good condition with all questions answered. See disposition below. I have given the patient strict written and verbal instructions about care at home, follow-up, and signs and symptoms of worsening of condition and they did verbalize understanding. CRITICAL CARE:   None    CONSULTS:  None    PROCEDURES:  None    FINAL IMPRESSION      1. Lumbar radiculopathy    2. Acute left-sided low back pain without sciatica          DISPOSITION/PLAN     1. Lumbar radiculopathy    2.  Acute left-sided low back pain without sciatica        PATIENT REFERRED TO:  Lino Bahena MD  [address]  [facility] [facility] [place] [address]    Schedule an appointment as soon as possible for a visit in 2 days        DISCHARGE MEDICATIONS:  New Prescriptions    KETOROLAC (TORADOL) 10 MG TABLET    Take 1 tablet by mouth every 6 hours as needed for Pain    ORPHENADRINE (NORFLEX) 100 MG EXTENDED RELEASE TABLET    Take 1 tablet by mouth 2 times daily       (Please note that portions of this note were completed with a voice recognition program. Efforts were made to edit the dictations but occasionally words are mis-transcribed.)    Poncho Younger PA-C 10/13/20 10:46 AM    LASHAWN Portillo PA-C  10/13/20 1049

## 2020-10-13 NOTE — ED NOTES
Patient presents to Ed with complaint of left flank pain for past two weeks. .  Patient denies nausea or vomiting. Patient denies blood in urine. Respirations unlabored.      Cong Bethea RN  10/13/20 [id number]

## 2020-10-14 ENCOUNTER — OFFICE VISIT (OUTPATIENT)
Dept: PULMONOLOGY | Age: 55
End: 2020-10-14
Payer: MEDICARE

## 2020-10-14 VITALS
TEMPERATURE: 98.2 F | HEART RATE: 92 BPM | HEIGHT: 76 IN | WEIGHT: 315 LBS | SYSTOLIC BLOOD PRESSURE: 130 MMHG | BODY MASS INDEX: 38.36 KG/M2 | OXYGEN SATURATION: 97 % | DIASTOLIC BLOOD PRESSURE: 80 MMHG

## 2020-10-14 LAB
GFR SERPL CREATININE-BSD FRML MDRD: 77 ML/MIN/1.73M2
GFR SERPL CREATININE-BSD FRML MDRD: 87 ML/MIN/1.73M2

## 2020-10-14 PROCEDURE — 99213 OFFICE O/P EST LOW 20 MIN: CPT | Performed by: NURSE PRACTITIONER

## 2020-10-14 ASSESSMENT — ENCOUNTER SYMPTOMS
BACK PAIN: 1
WHEEZING: 0
SHORTNESS OF BREATH: 0
CHEST TIGHTNESS: 0
COUGH: 0
VOMITING: 0
DIARRHEA: 0
STRIDOR: 0
NAUSEA: 0

## 2020-10-14 NOTE — PROGRESS NOTES
[facility]      Irma Quick         [id number]  10/14/2020   Chief Complaint   Patient presents with    Follow-up     1yr sleep f/u [facility]        Pt of Dr. Preston Nguyen     PAP Download:   Salas Bettencourt or initial AHI: 99.4     Date of initial study: 5/9/16      Compliant  100%     Noncompliant 0 %     PAP Type AirSense 10 Level  [id number]   Avg Hrs/Day 11hrs 40min   AHI: 1.0   Recorded compliance dates , 9/14/20-10/13/20   Machine/Mfg:   [x] ResMed    [] Respironics/Dreamstation   Interface:   [] Nasal    [x] Nasal pillows   [] FFM      Provider:      [x] [facility]     [x]Evgeny     [] Wilver    [] [facility]    [] [place]               [] [facility]      [] Adaptive    [] [address]      [] Other    Neck Size: 19.75  Mallampati Mallampati 4  ESS:  20  SAQLI: 51  Here is a scan of the most recent download:            Presentation:   Taisha Kulkarni presents for sleep medicine follow up for obstructive sleep apnea  Since the last visit, Taisha Kulkarni reports good compliance and benefit from PAP therapy, denies issues with mask or machine would like to have new PAP supplies. Equipment issues: The pressure is  acceptable, the mask is acceptable     Sleep issues:  Do you feel better? Yes  More rested? Yes   Better concentration? yes    Progress History:   Since last visit any new medical issues? Yes back pain  New ER or hospitlal visits? Yes for back pain eval discharged same day  Any new or changes in medicines? Yes pain meds  Any new sleep medicines?  No        Past Medical History:   Diagnosis Date    Anxiety     Arrhythmia     Arthritis     CAD (coronary artery disease)     Chest pain     HX OF:    CHF (congestive heart failure) (HCC)     Chronic back pain     COPD (chronic obstructive pulmonary disease) (HCC)     Depression     Diverticula of colon     Fatigue     Heart attack (HCC)     Hyperlipidemia     Hypertension     Panic attacks     Pneumonia     Sleep apnea     SOB (shortness of breath)     Spondylosis     sponylolisthesis L5    Swelling        Past Surgical History:   Procedure Laterality Date    CARDIAC CATHETERIZATION  01/30/2008    EF 20%    CARDIOVASCULAR STRESS TEST  02/16/2009    EF 52%    PRE-MALIGNANT / BENIGN SKIN LESION EXCISION  5/17/12    mole on abd-left arm-Dr. Tracy Stockton    TRANSTHORACIC ECHOCARDIOGRAM  07/22/2011    EF 55-65%       Social History     Tobacco Use    Smoking status: Passive Smoke Exposure - Never Smoker    Smokeless tobacco: Never Used   Substance Use Topics    Alcohol use: No     Alcohol/week: 0.0 standard drinks     Comment: quit drinking     Drug use: No       Allergies   Allergen Reactions    Losartan Potassium     Mobic [Meloxicam]        Current Outpatient Medications   Medication Sig Dispense Refill    ketorolac (TORADOL) 10 MG tablet Take 1 tablet by mouth every 6 hours as needed for Pain 15 tablet 0    orphenadrine (NORFLEX) 100 MG extended release tablet Take 1 tablet by mouth 2 times daily 14 tablet 0    Cholecalciferol (VITAMIN D3) 50 MCG (2000 UT) CAPS Take by mouth 2 times daily      Folic Acid (FOLATE PO) Take by mouth 2 times daily 1333mcg tablets      dicyclomine (BENTYL) 10 MG capsule Take 1 capsule by mouth every 6 hours as needed (cramps) 20 capsule 0    ondansetron (ZOFRAN ODT) 4 MG disintegrating tablet Take 1 tablet by mouth every 8 hours as needed for Nausea 20 tablet 0    acetaminophen (APAP EXTRA STRENGTH) 500 MG tablet Take 1 tablet by mouth every 6 hours as needed for Pain 120 tablet 0    traMADol (ULTRAM) 50 MG tablet Take 50 mg by mouth every 8 hours as needed for Pain. Tremaine Brooks       umeclidinium-vilanterol (ANORO ELLIPTA) 62.5-25 MCG/INH AEPB inhaler Inhale 1 puff into the lungs daily      metFORMIN (GLUCOPHAGE-XR) 500 MG extended release tablet Take 500 mg by mouth daily (with breakfast)      lisinopril (PRINIVIL;ZESTRIL) 10 MG tablet Take 1 tablet by mouth daily 30 tablet 11    allopurinol (ZYLOPRIM) 300 MG tablet Take 1 tablet by mouth daily 30 tablet 5    atorvastatin (LIPITOR) 20 MG tablet TAKE 1 TABLET BY MOUTH ONE TIME A DAY 30 tablet 5    bumetanide (BUMEX) 0.5 MG tablet Take 2 tablets daily. If weight gain 3Ibs in 1 day or 5Ibs in 1 week, take 2 tablets in AM and PM as needed. 60 tablet 5    carvedilol (COREG) 25 MG tablet Take 1 tablet by mouth 2 times daily 60 tablet 5    ranitidine (ZANTAC) 150 MG tablet TAKE ONE TABLET BY MOUTH TWICE DAILY 60 tablet 5    spironolactone (ALDACTONE) 25 MG tablet Take 0.5 tablets by mouth daily 15 tablet 5    meclizine (ANTIVERT) 25 MG tablet Take 1 tablet by mouth 3 times daily as needed 60 tablet 5    carBAMazepine (TEGRETOL) 200 MG tablet Take 200 mg by mouth 2 times daily       Blood Pressure KIT This is a prescription for a blood pressure cuff for at home use. 1 kit 0    risperiDONE (RISPERDAL) 0.5 MG tablet Take 0.5 mg by mouth 2 times daily       citalopram (CELEXA) 20 MG tablet Take 1 tablet by mouth daily. 30 tablet 5    aspirin EC 81 MG EC tablet Take 81 mg by mouth daily. With food; blood thinner       No current facility-administered medications for this visit. Family History   Problem Relation Age of Onset    High Cholesterol Father     High Blood Pressure Father     Coronary Art Dis Father     Cancer Maternal Grandmother         colon        Review of Systems -   Review of Systems   Constitutional: Negative for chills, fever and unexpected weight change. Respiratory: Negative for cough, chest tightness, shortness of breath, wheezing and stridor. Cardiovascular: Negative for chest pain and leg swelling. Gastrointestinal: Negative for diarrhea, nausea and vomiting. Genitourinary: Negative for dysuria. Musculoskeletal: Positive for back pain. Physical Exam:    BMI:  Body mass index is 44.06 kg/m².     Wt Readings from Last 3 Encounters:   10/14/20 (!) 362 lb (164.2 kg)   08/11/20 (!) 353 lb 12.8 oz (160.5 kg) 02/05/20 (!) 353 lb (160.1 kg)     Weight gained 9 lbs over 2  Vitals: /80 (Site: Left Lower Arm, Position: Sitting, Cuff Size: Medium Adult)   Pulse 92   Temp 98.2 °F (36.8 °C) (Temporal)   Ht 6' 4\" (1.93 m)   Wt (!) 362 lb (164.2 kg)   SpO2 97% Comment: r/a  BMI 44.06 kg/m²       Physical Exam  Vitals signs and nursing note reviewed. Constitutional:       General: He is not in acute distress. Appearance: He is well-developed. He is obese. Comments: BMI 44   HENT:      Head: Normocephalic and atraumatic. Neck:      Musculoskeletal: Neck supple. Trachea: No tracheal deviation. Cardiovascular:      Rate and Rhythm: Normal rate and regular rhythm. Heart sounds: Normal heart sounds. No murmur. Pulmonary:      Effort: Pulmonary effort is normal. No respiratory distress. Breath sounds: Normal breath sounds. No stridor. No wheezing or rales. Chest:      Chest wall: No tenderness. Abdominal:      General: Bowel sounds are normal. There is no distension. Palpations: Abdomen is soft. Skin:     General: Skin is warm and dry. Capillary Refill: Capillary refill takes less than 2 seconds. Neurological:      Mental Status: He is alert and oriented to person, place, and time. Psychiatric:         Behavior: Behavior normal.         Thought Content: Thought content normal.           ASSESSMENT/DIAGNOSIS     Diagnosis Orders   1. KRISTEN on CPAP  DME Order for CPAP as OP   2. Morbid obesity with BMI of 40.0-44.9, adult [facility])              Plan   Do you need any equipment today? Yes PAP supplies  - Advised to continue current positive airway pressure therapy with above described pressure.    - Advised to keep goodcompliance with current recommended pressure to get optimal results and clinical improvement  - Recommend 7-9 hours of sleep with PAP  - Instructed to call DME company regarding supplies if needed.   -Patient to call my office for earlier appointment if needed for worsening of sleep symptoms.   -Discussed weight loss  - Educated about my impression and plan. Patient verbalizesunderstanding.   We will see back in: 1 year with download     Information added by my medical assistant/LPN was reviewed today

## 2020-11-27 ENCOUNTER — APPOINTMENT (OUTPATIENT)
Dept: GENERAL RADIOLOGY | Age: 55
DRG: 177 | End: 2020-11-27
Payer: MEDICARE

## 2020-11-27 ENCOUNTER — HOSPITAL ENCOUNTER (INPATIENT)
Age: 55
LOS: 1 days | Discharge: HOME | DRG: 177 | End: 2020-11-28
Attending: FAMILY MEDICINE | Admitting: INTERNAL MEDICINE
Payer: MEDICARE

## 2020-11-27 PROBLEM — U07.1 COVID-19: Status: ACTIVE | Noted: 2020-11-27

## 2020-11-27 LAB
ALBUMIN SERPL-MCNC: 4 G/DL (ref 3.5–5.1)
ALP BLD-CCNC: 52 U/L (ref 38–126)
ALT SERPL-CCNC: 31 U/L (ref 11–66)
ANION GAP SERPL CALCULATED.3IONS-SCNC: 14 MEQ/L (ref 8–16)
APTT: 33.6 SECONDS (ref 22–38)
AST SERPL-CCNC: 31 U/L (ref 5–40)
ATYPICAL LYMPHOCYTES: ABNORMAL %
BASOPHILS # BLD: 0.2 %
BASOPHILS ABSOLUTE: 0 THOU/MM3 (ref 0–0.1)
BILIRUB SERPL-MCNC: 0.6 MG/DL (ref 0.3–1.2)
BILIRUBIN DIRECT: < 0.2 MG/DL (ref 0–0.3)
BUN BLDV-MCNC: 17 MG/DL (ref 7–22)
C-REACTIVE PROTEIN: 9.39 MG/DL (ref 0–1)
CALCIUM SERPL-MCNC: 9.3 MG/DL (ref 8.5–10.5)
CHLORIDE BLD-SCNC: 98 MEQ/L (ref 98–111)
CO2: 26 MEQ/L (ref 23–33)
CREAT SERPL-MCNC: 1.2 MG/DL (ref 0.4–1.2)
D-DIMER QUANTITATIVE: 321 NG/ML FEU (ref 0–500)
EKG ATRIAL RATE: 112 BPM
EKG P AXIS: 42 DEGREES
EKG P-R INTERVAL: 166 MS
EKG Q-T INTERVAL: 332 MS
EKG QRS DURATION: 106 MS
EKG QTC CALCULATION (BAZETT): 453 MS
EKG R AXIS: 38 DEGREES
EKG T AXIS: 8 DEGREES
EKG VENTRICULAR RATE: 112 BPM
EOSINOPHIL # BLD: 0.9 %
EOSINOPHILS ABSOLUTE: 0 THOU/MM3 (ref 0–0.4)
ERYTHROCYTE [DISTWIDTH] IN BLOOD BY AUTOMATED COUNT: 14.6 % (ref 11.5–14.5)
ERYTHROCYTE [DISTWIDTH] IN BLOOD BY AUTOMATED COUNT: 46.9 FL (ref 35–45)
FERRITIN: 1013 NG/ML (ref 22–322)
GFR SERPL CREATININE-BSD FRML MDRD: 76 ML/MIN/1.73M2
GLUCOSE BLD-MCNC: 115 MG/DL (ref 70–108)
GLUCOSE BLD-MCNC: 130 MG/DL (ref 70–108)
GLUCOSE BLD-MCNC: 134 MG/DL (ref 70–108)
HCT VFR BLD CALC: 43.8 % (ref 42–52)
HEMOGLOBIN: 14.2 GM/DL (ref 14–18)
IMMATURE GRANS (ABS): 0.01 THOU/MM3 (ref 0–0.07)
IMMATURE GRANULOCYTES: 0.2 %
INR BLD: 1.09 (ref 0.85–1.13)
LACTIC ACID: 1.3 MMOL/L (ref 0.5–2.2)
LD: 298 U/L (ref 100–190)
LIPASE: 31.9 U/L (ref 5.6–51.3)
LYMPHOCYTES # BLD: 28.1 %
LYMPHOCYTES ABSOLUTE: 1.2 THOU/MM3 (ref 1–4.8)
MCH RBC QN AUTO: 28.6 PG (ref 26–33)
MCHC RBC AUTO-ENTMCNC: 32.4 GM/DL (ref 32.2–35.5)
MCV RBC AUTO: 88.3 FL (ref 80–94)
MONOCYTES # BLD: 8.8 %
MONOCYTES ABSOLUTE: 0.4 THOU/MM3 (ref 0.4–1.3)
NUCLEATED RED BLOOD CELLS: 0 /100 WBC
OSMOLALITY CALCULATION: 278.1 MOSMOL/KG (ref 275–300)
PLATELET # BLD: 131 THOU/MM3 (ref 130–400)
PLATELET ESTIMATE: ADEQUATE
PMV BLD AUTO: 12.8 FL (ref 9.4–12.4)
POTASSIUM SERPL-SCNC: 5 MEQ/L (ref 3.5–5.2)
PRO-BNP: 18.2 PG/ML (ref 0–900)
PROCALCITONIN: 0.09 NG/ML (ref 0.01–0.09)
RBC # BLD: 4.96 MILL/MM3 (ref 4.7–6.1)
SARS-COV-2, NAAT: DETECTED
SCAN OF BLOOD SMEAR: NORMAL
SEG NEUTROPHILS: 61.8 %
SEGMENTED NEUTROPHILS ABSOLUTE COUNT: 2.7 THOU/MM3 (ref 1.8–7.7)
SODIUM BLD-SCNC: 138 MEQ/L (ref 135–145)
TOTAL PROTEIN: 8.2 G/DL (ref 6.1–8)
TROPONIN T: < 0.01 NG/ML
WBC # BLD: 4.4 THOU/MM3 (ref 4.8–10.8)

## 2020-11-27 PROCEDURE — 84145 PROCALCITONIN (PCT): CPT

## 2020-11-27 PROCEDURE — XW033E5 INTRODUCTION OF REMDESIVIR ANTI-INFECTIVE INTO PERIPHERAL VEIN, PERCUTANEOUS APPROACH, NEW TECHNOLOGY GROUP 5: ICD-10-PCS | Performed by: INTERNAL MEDICINE

## 2020-11-27 PROCEDURE — G0378 HOSPITAL OBSERVATION PER HR: HCPCS

## 2020-11-27 PROCEDURE — 85379 FIBRIN DEGRADATION QUANT: CPT

## 2020-11-27 PROCEDURE — 86901 BLOOD TYPING SEROLOGIC RH(D): CPT

## 2020-11-27 PROCEDURE — U0002 COVID-19 LAB TEST NON-CDC: HCPCS

## 2020-11-27 PROCEDURE — 96372 THER/PROPH/DIAG INJ SC/IM: CPT

## 2020-11-27 PROCEDURE — 2580000003 HC RX 258: Performed by: FAMILY MEDICINE

## 2020-11-27 PROCEDURE — 6360000002 HC RX W HCPCS: Performed by: INTERNAL MEDICINE

## 2020-11-27 PROCEDURE — 6370000000 HC RX 637 (ALT 250 FOR IP): Performed by: INTERNAL MEDICINE

## 2020-11-27 PROCEDURE — 80053 COMPREHEN METABOLIC PANEL: CPT

## 2020-11-27 PROCEDURE — 83880 ASSAY OF NATRIURETIC PEPTIDE: CPT

## 2020-11-27 PROCEDURE — 1200000003 HC TELEMETRY R&B

## 2020-11-27 PROCEDURE — 99223 1ST HOSP IP/OBS HIGH 75: CPT | Performed by: INTERNAL MEDICINE

## 2020-11-27 PROCEDURE — 83615 LACTATE (LD) (LDH) ENZYME: CPT

## 2020-11-27 PROCEDURE — 99284 EMERGENCY DEPT VISIT MOD MDM: CPT

## 2020-11-27 PROCEDURE — 86140 C-REACTIVE PROTEIN: CPT

## 2020-11-27 PROCEDURE — 93005 ELECTROCARDIOGRAM TRACING: CPT | Performed by: FAMILY MEDICINE

## 2020-11-27 PROCEDURE — 82248 BILIRUBIN DIRECT: CPT

## 2020-11-27 PROCEDURE — 82948 REAGENT STRIP/BLOOD GLUCOSE: CPT

## 2020-11-27 PROCEDURE — 71045 X-RAY EXAM CHEST 1 VIEW: CPT

## 2020-11-27 PROCEDURE — 94761 N-INVAS EAR/PLS OXIMETRY MLT: CPT

## 2020-11-27 PROCEDURE — 86850 RBC ANTIBODY SCREEN: CPT

## 2020-11-27 PROCEDURE — 2580000003 HC RX 258: Performed by: INTERNAL MEDICINE

## 2020-11-27 PROCEDURE — 36415 COLL VENOUS BLD VENIPUNCTURE: CPT

## 2020-11-27 PROCEDURE — P9059 PLASMA, FRZ BETWEEN 8-24HOUR: HCPCS

## 2020-11-27 PROCEDURE — XW13325 TRANSFUSION OF CONVALESCENT PLASMA (NONAUTOLOGOUS) INTO PERIPHERAL VEIN, PERCUTANEOUS APPROACH, NEW TECHNOLOGY GROUP 5: ICD-10-PCS | Performed by: INTERNAL MEDICINE

## 2020-11-27 PROCEDURE — 84484 ASSAY OF TROPONIN QUANT: CPT

## 2020-11-27 PROCEDURE — 82728 ASSAY OF FERRITIN: CPT

## 2020-11-27 PROCEDURE — 96365 THER/PROPH/DIAG IV INF INIT: CPT

## 2020-11-27 PROCEDURE — 94640 AIRWAY INHALATION TREATMENT: CPT

## 2020-11-27 PROCEDURE — 83605 ASSAY OF LACTIC ACID: CPT

## 2020-11-27 PROCEDURE — 85730 THROMBOPLASTIN TIME PARTIAL: CPT

## 2020-11-27 PROCEDURE — 2500000003 HC RX 250 WO HCPCS: Performed by: INTERNAL MEDICINE

## 2020-11-27 PROCEDURE — 2700000000 HC OXYGEN THERAPY PER DAY

## 2020-11-27 PROCEDURE — 85610 PROTHROMBIN TIME: CPT

## 2020-11-27 PROCEDURE — 83690 ASSAY OF LIPASE: CPT

## 2020-11-27 PROCEDURE — 86900 BLOOD TYPING SEROLOGIC ABO: CPT

## 2020-11-27 PROCEDURE — 85025 COMPLETE CBC W/AUTO DIFF WBC: CPT

## 2020-11-27 RX ORDER — SODIUM CHLORIDE 0.9 % (FLUSH) 0.9 %
10 SYRINGE (ML) INJECTION EVERY 12 HOURS SCHEDULED
Status: DISCONTINUED | OUTPATIENT
Start: 2020-11-27 | End: 2020-11-28 | Stop reason: HOSPADM

## 2020-11-27 RX ORDER — MAGNESIUM SULFATE IN WATER 40 MG/ML
2 INJECTION, SOLUTION INTRAVENOUS PRN
Status: DISCONTINUED | OUTPATIENT
Start: 2020-11-27 | End: 2020-11-28 | Stop reason: HOSPADM

## 2020-11-27 RX ORDER — 0.9 % SODIUM CHLORIDE 0.9 %
20 INTRAVENOUS SOLUTION INTRAVENOUS ONCE
Status: COMPLETED | OUTPATIENT
Start: 2020-11-27 | End: 2020-11-27

## 2020-11-27 RX ORDER — ZINC SULFATE 50(220)MG
50 CAPSULE ORAL DAILY
Status: DISCONTINUED | OUTPATIENT
Start: 2020-11-27 | End: 2020-11-28 | Stop reason: HOSPADM

## 2020-11-27 RX ORDER — SODIUM CHLORIDE 9 MG/ML
INJECTION, SOLUTION INTRAVENOUS CONTINUOUS
Status: DISCONTINUED | OUTPATIENT
Start: 2020-11-27 | End: 2020-11-28

## 2020-11-27 RX ORDER — CARVEDILOL 25 MG/1
25 TABLET ORAL 2 TIMES DAILY
Status: DISCONTINUED | OUTPATIENT
Start: 2020-11-27 | End: 2020-11-28 | Stop reason: HOSPADM

## 2020-11-27 RX ORDER — ORPHENADRINE CITRATE 100 MG/1
100 TABLET, EXTENDED RELEASE ORAL 2 TIMES DAILY
Status: DISCONTINUED | OUTPATIENT
Start: 2020-11-27 | End: 2020-11-28 | Stop reason: HOSPADM

## 2020-11-27 RX ORDER — ACETAMINOPHEN 325 MG/1
650 TABLET ORAL EVERY 6 HOURS PRN
Status: DISCONTINUED | OUTPATIENT
Start: 2020-11-27 | End: 2020-11-28 | Stop reason: HOSPADM

## 2020-11-27 RX ORDER — TRAMADOL HYDROCHLORIDE 50 MG/1
50 TABLET ORAL EVERY 8 HOURS PRN
Status: DISCONTINUED | OUTPATIENT
Start: 2020-11-27 | End: 2020-11-28 | Stop reason: HOSPADM

## 2020-11-27 RX ORDER — FOLIC ACID 1 MG/1
1000 TABLET ORAL 2 TIMES DAILY
Status: DISCONTINUED | OUTPATIENT
Start: 2020-11-27 | End: 2020-11-28 | Stop reason: HOSPADM

## 2020-11-27 RX ORDER — PROMETHAZINE HYDROCHLORIDE 25 MG/1
12.5 TABLET ORAL EVERY 6 HOURS PRN
Status: DISCONTINUED | OUTPATIENT
Start: 2020-11-27 | End: 2020-11-28 | Stop reason: HOSPADM

## 2020-11-27 RX ORDER — MECLIZINE HCL 12.5 MG/1
25 TABLET ORAL 3 TIMES DAILY PRN
Status: DISCONTINUED | OUTPATIENT
Start: 2020-11-27 | End: 2020-11-28 | Stop reason: HOSPADM

## 2020-11-27 RX ORDER — IPRATROPIUM BROMIDE AND ALBUTEROL SULFATE 2.5; .5 MG/3ML; MG/3ML
1 SOLUTION RESPIRATORY (INHALATION)
Status: DISCONTINUED | OUTPATIENT
Start: 2020-11-27 | End: 2020-11-28 | Stop reason: HOSPADM

## 2020-11-27 RX ORDER — ACETAMINOPHEN 650 MG/1
650 SUPPOSITORY RECTAL EVERY 6 HOURS PRN
Status: DISCONTINUED | OUTPATIENT
Start: 2020-11-27 | End: 2020-11-27

## 2020-11-27 RX ORDER — POLYETHYLENE GLYCOL 3350 17 G/17G
17 POWDER, FOR SOLUTION ORAL DAILY PRN
Status: DISCONTINUED | OUTPATIENT
Start: 2020-11-27 | End: 2020-11-28 | Stop reason: HOSPADM

## 2020-11-27 RX ORDER — ACETAMINOPHEN 650 MG/1
650 SUPPOSITORY RECTAL EVERY 6 HOURS PRN
Status: DISCONTINUED | OUTPATIENT
Start: 2020-11-27 | End: 2020-11-28 | Stop reason: HOSPADM

## 2020-11-27 RX ORDER — POTASSIUM CHLORIDE 20 MEQ/1
40 TABLET, EXTENDED RELEASE ORAL PRN
Status: DISCONTINUED | OUTPATIENT
Start: 2020-11-27 | End: 2020-11-28 | Stop reason: HOSPADM

## 2020-11-27 RX ORDER — POTASSIUM CHLORIDE 7.45 MG/ML
10 INJECTION INTRAVENOUS PRN
Status: DISCONTINUED | OUTPATIENT
Start: 2020-11-27 | End: 2020-11-28 | Stop reason: HOSPADM

## 2020-11-27 RX ORDER — SPIRONOLACTONE 25 MG/1
12.5 TABLET ORAL DAILY
Status: DISCONTINUED | OUTPATIENT
Start: 2020-11-27 | End: 2020-11-27

## 2020-11-27 RX ORDER — RISPERIDONE 0.25 MG/1
0.5 TABLET, FILM COATED ORAL 2 TIMES DAILY
Status: DISCONTINUED | OUTPATIENT
Start: 2020-11-27 | End: 2020-11-27

## 2020-11-27 RX ORDER — ATORVASTATIN CALCIUM 20 MG/1
20 TABLET, FILM COATED ORAL DAILY
Status: DISCONTINUED | OUTPATIENT
Start: 2020-11-27 | End: 2020-11-28 | Stop reason: HOSPADM

## 2020-11-27 RX ORDER — DEXAMETHASONE 4 MG/1
6 TABLET ORAL DAILY
Status: DISCONTINUED | OUTPATIENT
Start: 2020-11-27 | End: 2020-11-28 | Stop reason: HOSPADM

## 2020-11-27 RX ORDER — CITALOPRAM 20 MG/1
20 TABLET ORAL DAILY
Status: DISCONTINUED | OUTPATIENT
Start: 2020-11-27 | End: 2020-11-28 | Stop reason: HOSPADM

## 2020-11-27 RX ORDER — RISPERIDONE 2 MG/1
2 TABLET, FILM COATED ORAL NIGHTLY
Status: DISCONTINUED | OUTPATIENT
Start: 2020-11-27 | End: 2020-11-28 | Stop reason: HOSPADM

## 2020-11-27 RX ORDER — ACETAMINOPHEN 325 MG/1
650 TABLET ORAL EVERY 6 HOURS PRN
Status: DISCONTINUED | OUTPATIENT
Start: 2020-11-27 | End: 2020-11-27

## 2020-11-27 RX ORDER — SODIUM CHLORIDE 9 MG/ML
INJECTION, SOLUTION INTRAVENOUS CONTINUOUS
Status: DISCONTINUED | OUTPATIENT
Start: 2020-11-27 | End: 2020-11-27

## 2020-11-27 RX ORDER — SODIUM CHLORIDE 0.9 % (FLUSH) 0.9 %
10 SYRINGE (ML) INJECTION PRN
Status: DISCONTINUED | OUTPATIENT
Start: 2020-11-27 | End: 2020-11-28 | Stop reason: HOSPADM

## 2020-11-27 RX ORDER — LISINOPRIL 10 MG/1
10 TABLET ORAL DAILY
Status: DISCONTINUED | OUTPATIENT
Start: 2020-11-27 | End: 2020-11-27

## 2020-11-27 RX ORDER — VITAMIN B COMPLEX
2000 TABLET ORAL DAILY
Status: DISCONTINUED | OUTPATIENT
Start: 2020-11-27 | End: 2020-11-28 | Stop reason: HOSPADM

## 2020-11-27 RX ORDER — ONDANSETRON 2 MG/ML
4 INJECTION INTRAMUSCULAR; INTRAVENOUS EVERY 6 HOURS PRN
Status: DISCONTINUED | OUTPATIENT
Start: 2020-11-27 | End: 2020-11-28 | Stop reason: HOSPADM

## 2020-11-27 RX ORDER — CARBAMAZEPINE 200 MG/1
200 TABLET ORAL 2 TIMES DAILY
Status: DISCONTINUED | OUTPATIENT
Start: 2020-11-27 | End: 2020-11-28 | Stop reason: HOSPADM

## 2020-11-27 RX ORDER — ASCORBIC ACID 500 MG
500 TABLET ORAL DAILY
Status: DISCONTINUED | OUTPATIENT
Start: 2020-11-27 | End: 2020-11-28 | Stop reason: HOSPADM

## 2020-11-27 RX ORDER — ASPIRIN 81 MG/1
81 TABLET ORAL DAILY
Status: DISCONTINUED | OUTPATIENT
Start: 2020-11-27 | End: 2020-11-28 | Stop reason: HOSPADM

## 2020-11-27 RX ADMIN — CARVEDILOL 25 MG: 25 TABLET, FILM COATED ORAL at 17:16

## 2020-11-27 RX ADMIN — SODIUM CHLORIDE: 9 INJECTION, SOLUTION INTRAVENOUS at 14:24

## 2020-11-27 RX ADMIN — DEXAMETHASONE 6 MG: 4 TABLET ORAL at 17:17

## 2020-11-27 RX ADMIN — OXYCODONE HYDROCHLORIDE AND ACETAMINOPHEN 500 MG: 500 TABLET ORAL at 17:16

## 2020-11-27 RX ADMIN — ORPHENADRINE CITRATE 100 MG: 100 TABLET, EXTENDED RELEASE ORAL at 19:31

## 2020-11-27 RX ADMIN — REMDESIVIR 200 MG: 100 INJECTION, POWDER, LYOPHILIZED, FOR SOLUTION INTRAVENOUS at 19:29

## 2020-11-27 RX ADMIN — CITALOPRAM 20 MG: 20 TABLET, FILM COATED ORAL at 17:15

## 2020-11-27 RX ADMIN — Medication 2000 UNITS: at 17:16

## 2020-11-27 RX ADMIN — Medication 50 MG: at 17:16

## 2020-11-27 RX ADMIN — RISPERIDONE 2 MG: 2 TABLET ORAL at 20:34

## 2020-11-27 RX ADMIN — ATORVASTATIN CALCIUM 20 MG: 20 TABLET, FILM COATED ORAL at 17:15

## 2020-11-27 RX ADMIN — CARBAMAZEPINE 200 MG: 200 TABLET ORAL at 17:16

## 2020-11-27 RX ADMIN — FOLIC ACID 1000 MCG: 1 TABLET ORAL at 17:16

## 2020-11-27 RX ADMIN — ASPIRIN 81 MG: 81 TABLET, COATED ORAL at 17:15

## 2020-11-27 RX ADMIN — ENOXAPARIN SODIUM 40 MG: 40 INJECTION SUBCUTANEOUS at 20:34

## 2020-11-27 RX ADMIN — SODIUM CHLORIDE 20 ML: 9 INJECTION, SOLUTION INTRAVENOUS at 17:17

## 2020-11-27 RX ADMIN — IPRATROPIUM BROMIDE AND ALBUTEROL SULFATE 1 AMPULE: .5; 3 SOLUTION RESPIRATORY (INHALATION) at 23:07

## 2020-11-27 ASSESSMENT — ENCOUNTER SYMPTOMS
DIARRHEA: 1
SHORTNESS OF BREATH: 1
COUGH: 1
EYE DISCHARGE: 0

## 2020-11-27 ASSESSMENT — PAIN SCALES - GENERAL: PAINLEVEL_OUTOF10: 0

## 2020-11-27 NOTE — ED NOTES
Patient BS checked per orders. No insulin covered needed. . Dinner tray given. VSS.       Ayden Barrera, JESUS  11/27/20 [id number]

## 2020-11-27 NOTE — H&P
Hospitalist - History & Physical      Patient: Nae Hill    Unit/Bed:30/030A  YOB: 1965  MRN: [id number]   Acct: [de-identified]   PCP: Lino Bahena MD    Date of Service: Pt seen/examined on 11/27/20  and Admitted to Inpatient with expected LOS greater than two midnights due to medical therapy. Chief Complaint:  Shortness of breath, NVD    Assessment and Plan:-  1. COVID 19   - Plasma, Remdisivir, Zinc, Vit D, Vit C, Decadron. Consent for plasma received. Patient understands risks vs benefits. Wants to try plasma. Procalcitonin negative, no role of antibiotics for now. - Ddimer is still pending. If really high, will need to check for PE    2. Acute hypoxic resp failure due to above   - Wean off O2 as tolerated    3. DMII   - Hold oral agents. Start ISS. Monitor BG closely and adjust accordingly. 4. Essential Hypertension   - BP borderline right now so will hold home diuretics. Give IVF    5. Generalized weakness/NV and diarrhea due to COVID   - IVF   - PT/OT   - Supportive care    6. COPD - stable   - Continue home inhalers. Breathing tx    7. HFpEF - compensated   - Continue home medications. 8. KRISTEN   - On bipap at home. Will continue      History Of Present Illness:      49-year-old gentleman with past medical history of COPD, CHF with preserved EF, KRISTEN on BiPAP, morbid obesity, borderline diabetes came to ER due to shortness of breath, nausea, vomiting, diarrhea, fevers. Patient states that his symptoms started about a week ago and progressively worsened. Initially patient had cough and took some cough medications without any improvement. Patient states that over the course of week he started becoming more short of breath. He started having some diarrhea. Patient complains of weakness and fatigue. He also has loss of taste. He had a fever at home but unable to tell me how high. Patient denies any sick contacts. Denies anyone with Covid.   Denies any recent travel history. No other complaints. In ER, patient was requiring supplemental oxygen for hypoxia. Was also tachypneic. Labs showed slight elevation in creatinine above baseline. Patient Covid came back positive. Chest x-ray showed bilateral interstitial opacities concerning for pneumonia typical versus atypical.  Of note procalcitonin was negative. Patient was started on Covid treatment admitted. Past Medical History:        Diagnosis Date    Anxiety     Arrhythmia     Arthritis     CAD (coronary artery disease)     Chest pain     HX OF:    CHF (congestive heart failure) (HCC)     Chronic back pain     COPD (chronic obstructive pulmonary disease) (HCC)     Depression     Diverticula of colon     Fatigue     Heart attack (HCC)     Hyperlipidemia     Hypertension     Panic attacks     Pneumonia     Sleep apnea     SOB (shortness of breath)     Spondylosis     sponylolisthesis L5    Swelling        Past Surgical History:        Procedure Laterality Date    CARDIAC CATHETERIZATION  01/30/2008    EF 20%    CARDIOVASCULAR STRESS TEST  02/16/2009    EF 52%    PRE-MALIGNANT / BENIGN SKIN LESION EXCISION  5/17/12    mole on abd-left arm-Dr. Tracy Stockton    TRANSTHORACIC ECHOCARDIOGRAM  07/22/2011    EF 55-65%       Home Medications:   No current facility-administered medications on file prior to encounter.       Current Outpatient Medications on File Prior to Encounter   Medication Sig Dispense Refill    ketorolac (TORADOL) 10 MG tablet Take 1 tablet by mouth every 6 hours as needed for Pain 15 tablet 0    Cholecalciferol (VITAMIN D3) 50 MCG (2000 UT) CAPS Take by mouth 2 times daily      Folic Acid (FOLATE PO) Take by mouth 2 times daily 1333mcg tablets      dicyclomine (BENTYL) 10 MG capsule Take 1 capsule by mouth every 6 hours as needed (cramps) 20 capsule 0    ondansetron (ZOFRAN ODT) 4 MG disintegrating tablet Take 1 tablet by mouth every 8 hours as needed for Nausea 20 tablet 0    acetaminophen (APAP EXTRA STRENGTH) 500 MG tablet Take 1 tablet by mouth every 6 hours as needed for Pain 120 tablet 0    traMADol (ULTRAM) 50 MG tablet Take 50 mg by mouth every 8 hours as needed for Pain. Ye Gil metFORMIN (GLUCOPHAGE-XR) 500 MG extended release tablet Take 500 mg by mouth daily (with breakfast)      lisinopril (PRINIVIL;ZESTRIL) 10 MG tablet Take 1 tablet by mouth daily 30 tablet 11    allopurinol (ZYLOPRIM) 300 MG tablet Take 1 tablet by mouth daily 30 tablet 5    atorvastatin (LIPITOR) 20 MG tablet TAKE 1 TABLET BY MOUTH ONE TIME A DAY 30 tablet 5    bumetanide (BUMEX) 0.5 MG tablet Take 2 tablets daily. If weight gain 3Ibs in 1 day or 5Ibs in 1 week, take 2 tablets in AM and PM as needed. 60 tablet 5    carvedilol (COREG) 25 MG tablet Take 1 tablet by mouth 2 times daily 60 tablet 5    spironolactone (ALDACTONE) 25 MG tablet Take 0.5 tablets by mouth daily 15 tablet 5    meclizine (ANTIVERT) 25 MG tablet Take 1 tablet by mouth 3 times daily as needed 60 tablet 5    carBAMazepine (TEGRETOL) 200 MG tablet Take 200 mg by mouth 2 times daily       Blood Pressure KIT This is a prescription for a blood pressure cuff for at home use. 1 kit 0    risperiDONE (RISPERDAL) 2 MG tablet Take 2 mg by mouth nightly       citalopram (CELEXA) 20 MG tablet Take 1 tablet by mouth daily. 30 tablet 5    aspirin EC 81 MG EC tablet Take 81 mg by mouth daily. With food; blood thinner      orphenadrine (NORFLEX) 100 MG extended release tablet Take 1 tablet by mouth 2 times daily 14 tablet 0    ranitidine (ZANTAC) 150 MG tablet TAKE ONE TABLET BY MOUTH TWICE DAILY 60 tablet 5       Allergies:    Losartan potassium and Mobic [meloxicam]    Social History:    reports that he is a non-smoker but has been exposed to tobacco smoke. He has never used smokeless tobacco. He reports that he does not drink alcohol or use drugs.     Family History:       Problem Relation Age of Onset    High Cholesterol Father     High Blood Pressure Father     Coronary Art Dis Father     Cancer Maternal Grandmother         colon       Diet:  DIET LOW SODIUM 2 GM; Review of systems:   Pertinent positives as noted in the HPI. All other systems reviewed and negative. PHYSICAL EXAM:  /76   Pulse 109   Temp 98.5 °F (36.9 °C) (Oral)   Resp 30   Ht 6' 4\" (1.93 m)   Wt (!) 356 lb (161.5 kg)   SpO2 96%   BMI 43.33 kg/m²   General appearance: No apparent distress, appears stated age and cooperative. HEENT: Normal cephalic, atraumatic without obvious deformity. Pupils equal, round, and reactive to light. Extra ocular muscles intact. Conjunctivae/corneas clear. Neck: Supple, with full range of motion. No jugular venous distention. Trachea midline. Respiratory:  Decrease breath sounds b/l, +ronchi. Cardiovascular: Regular rate and rhythm with normal S1/S2 without murmurs, rubs or gallops. Abdomen: Soft, non-tender, non-distended with normal bowel sounds. Musculoskeletal:  No clubbing, cyanosis or edema bilaterally. Skin: Skin color, texture, turgor normal.  No rashes or lesions. Neurologic:  Neurovascularly intact without any focal sensory/motor deficits. Cranial nerves: II-XII intact, grossly non-focal.  Psychiatric: Alert and oriented, thought content appropriate, normal insight  Capillary Refill: Brisk,< 3 seconds   Peripheral Pulses: +2 palpable, equal bilaterally     Labs:   Recent Labs     11/27/20  1235   WBC 4.4*   HGB 14.2   HCT 43.8        Recent Labs     11/27/20  1235      K 5.0   CL 98   CO2 26   BUN 17   CREATININE 1.2   CALCIUM 9.3     Recent Labs     11/27/20  1235   AST 31   ALT 31   BILIDIR <0.2   BILITOT 0.6   ALKPHOS 52     Recent Labs     11/27/20  1235   INR 1.09     No results for input(s): Westley Oiler in the last 72 hours.     Urinalysis:    Lab Results   Component Value Date    NITRU NEGATIVE 10/13/2020    WBCUA 0-2 12/01/2019    BACTERIA NONE SEEN 12/01/2019    RBCUA 0-2 12/01/2019    BLOODU NEGATIVE 10/13/2020    SPECGRAV 1.028 03/14/2016    GLUCOSEU NEGATIVE 10/13/2020       Radiology:   XR CHEST PORTABLE   Final Result   Bilateral mixed airspace interstitial opacities concerning for pneumonia. Atypical or viral type pneumonia should be considered. .                **This report has been created using voice recognition software. It may contain minor errors which are inherent in voice recognition technology. **      Final report electronically signed by Dr. Cynthia Pino on 11/27/2020 12:52 PM        Xr Chest Portable    Result Date: 11/27/2020  PROCEDURE: XR CHEST PORTABLE CLINICAL INFORMATION: sob. COMPARISON: Ilene 10, 2018 TECHNIQUE: portable chest. FINDINGS: Bilateral mixed airspace interstitial opacities concerning for pneumonia. Atypical or viral type pneumonia should be considered. . Costophrenic angles are preserved. Mild cardiomegaly. No acute osseous findings. Bilateral mixed airspace interstitial opacities concerning for pneumonia. Atypical or viral type pneumonia should be considered. . **This report has been created using voice recognition software. It may contain minor errors which are inherent in voice recognition technology. ** Final report electronically signed by Dr. Cynthia Pino on 11/27/2020 12:52 PM      Electronically signed by Denis Driscoll MD on 11/27/2020 at 4:39 PM

## 2020-11-27 NOTE — PROGRESS NOTES
Remdesivir Initiation Note    This patient meets criteria for initiation of remdesivir based on the following:  Proven COVID-19 (+) 11/27/20  Mild to moderate disease (SpO2 ? 94% on RA or requiring supplemental O2) 4 L/min supplemental O2  Acceptable hepatic function (ALT within 5 times ULN)    Exclusion Criteria:  Severe disease requiring invasive or non-invasive mechanical ventilation (includes HFNC & BiPAP)  Could consider use in patients requiring high flow if early on in the disease course (based on symptom duration)  Use of more than 1 vasopressor prior to remdesivir initiation  Already improving on supportive treatment and/or impending discharge  Patients in whom the clinical team think death is in the immediate short-term where remdesivir is unlikely to change the clinical outcome     Liver function tests will be monitored daily while on remdesivir.     Tessie Pang, PharmD, BCPS  11/27/2020  4:01 PM

## 2020-11-27 NOTE — ACP (ADVANCE CARE PLANNING)
Advance Care Planning     Advance Care Planning Activator (Inpatient)  Conversation Note      Date of ACP Conversation: 11/27/2020    Conversation Conducted with: Patient with Decision Making Capacity    ACP Activator: Jonas Harrison    \"Who would you like to name as your primary health care decision-maker? \"               Name: Hector Stewart         Relationship: Friend          Phone number: [phone]  \"Can this person be reached easily? \" Yes  \"Who would you like to name as your back-up decision maker? \"   Name: Layne Christiansen        Relationship: Parent          Phone number: [phone]  \"Can this person be reached easily? \" Yes    Care Preferences    Ventilation: \"If you were in your present state of health and suddenly became very ill and were unable to breathe on your own, what would your preference be about the use of a ventilator (breathing machine) if it were available to you? \"      Would the patient desire the use of ventilator (breathing machine)?: yes    \"If your health worsens and it becomes clear that your chance of recovery is unlikely, what would your preference be about the use of a ventilator (breathing machine) if it were available to you? \"     Would the patient desire the use of ventilator (breathing machine)?: Yes      Resuscitation  \"CPR works best to restart the heart when there is a sudden event, like a heart attack, in someone who is otherwise healthy. Unfortunately, CPR does not typically restart the heart for people who have serious health conditions or who are very sick. \"    \"In the event your heart stopped as a result of an underlying serious health condition, would you want attempts to be made to restart your heart (answer \"yes\" for attempt to resuscitate) or would you prefer a natural death (answer \"no\" for do not attempt to resuscitate)? \" yes       [] Yes   [x] No   Educated Patient / Mary Lopez regarding differences between Advance Directives and portable DNR orders. Length of ACP Conversation in minutes:  23  Conversation Outcomes:  [x] ACP discussion completed  [] Existing advance directive reviewed with patient; no changes to patient's previously recorded wishes  [] New Advance Directive completed  [] Portable Do Not Rescitate prepared for Provider review and signature  [] POLST/POST/MOLST/MOST prepared for Provider review and signature      Follow-up plan:    [] Schedule follow-up conversation to continue planning  [x] Referred individual to Provider for additional questions/concerns   [] Advised patient/agent/surrogate to review completed ACP document and update if needed with changes in condition, patient preferences or care setting    [] This note routed to one or more involved healthcare providers      Nathalie Diaz was very pleasant and cooperative with assisting me in the answers needed regarding his care. He first thought he would be in and out but now realizes he is a bit weaker and sicker than he thought he was. - When addressing the EoL issues he admitted he has never thought of it before and really wouldn't know who to authorize to make those decisions. He admitted never talking to iwona about it. After extensive conversation he declined the option to complete at this time. - A consult will be placed with [facility] to have them follow up if the patient is admitted.    -

## 2020-11-27 NOTE — ED PROVIDER NOTES
[facility]  eMERGENCY dEPARTMENT eNCOUnter          CHIEF COMPLAINT       Chief Complaint   Patient presents with    Cough       Nurses Notes reviewed and I agree except as noted in the HPI. HISTORY OF PRESENT ILLNESS    Janice Palafox is a 54 y.o. male who presents with shortness of breath fever    Location/Symptom: Shortness of breath with fever and cough  Timing/Onset: About 10 days ago  Context/Setting: Obesity  History of hypertension, CHF, COPD  Type II diabetic  Indicates he never smoked  Quality: Fever chills  Nasal congestion  Loss of taste  Cough shortness of breath  Chest congestion  Diarrhea  Duration: About 10 days  Modifying Factors: NyQuil  Severity: 6/10    REVIEW OF SYSTEMS     Review of Systems   Constitutional: Positive for chills and fever. HENT: Positive for congestion. Eyes: Negative for discharge. Respiratory: Positive for cough and shortness of breath. Cardiovascular: Negative for chest pain. Gastrointestinal: Positive for diarrhea. Genitourinary: Negative for dysuria. Musculoskeletal: Positive for myalgias. Skin: Negative for rash. Neurological: Negative for light-headedness. Hematological: Negative for adenopathy. Psychiatric/Behavioral: Negative for confusion. PAST MEDICAL HISTORY    has a past medical history of Anxiety, Arrhythmia, Arthritis, CAD (coronary artery disease), Chest pain, CHF (congestive heart failure) ([address] Chronic back pain, COPD (chronic obstructive pulmonary disease) ([address] Depression, Diverticula of colon, Fatigue, Heart attack ([address] Hyperlipidemia, Hypertension, Panic attacks, Pneumonia, Sleep apnea, SOB (shortness of breath), Spondylosis, and Swelling. SURGICAL HISTORY      has a past surgical history that includes Cardiac catheterization (01/30/2008); cardiovascular stress test (02/16/2009); transthoracic echocardiogram (07/22/2011); and pre-malignant / benign skin lesion excision (5/17/12).     CURRENT TABLET    Take 50 mg by mouth every 8 hours as needed for Pain. Dimas Fregoso UMECLIDINIUM-VILANTEROL (ANORO ELLIPTA) 62.5-25 MCG/INH AEPB INHALER    Inhale 1 puff into the lungs daily       ALLERGIES     is allergic to losartan potassium and mobic [meloxicam]. FAMILY HISTORY     He indicated that his mother is alive. He indicated that his father is . He indicated that his maternal grandmother is . family history includes Cancer in his maternal grandmother; Coronary Art Dis in his father; High Blood Pressure in his father; High Cholesterol in his father. SOCIAL HISTORY      reports that he is a non-smoker but has been exposed to tobacco smoke. He has never used smokeless tobacco. He reports that he does not drink alcohol or use drugs. PHYSICAL EXAM     INITIAL VITALS:  height is 6' 4\" (1.93 m) and weight is 356 lb (161.5 kg) (abnormal). His oral temperature is 98.5 °F (36.9 °C). His blood pressure is 127/75 and his pulse is 111. His respiration is 30 and oxygen saturation is 97%. Physical Exam  Vitals signs and nursing note reviewed. Constitutional:       Comments: GCS 15   HENT:      Head: Normocephalic and atraumatic. Eyes:      General: No scleral icterus. Extraocular Movements: Extraocular movements intact. Pupils: Pupils are equal, round, and reactive to light. Neck:      Musculoskeletal: Normal range of motion and neck supple. No neck rigidity. Comments: No JVD  Cardiovascular:      Rate and Rhythm: Normal rate and regular rhythm. Pulses: Normal pulses. Heart sounds: Normal heart sounds. Pulmonary:      Comments: Breath sounds diminished bilateral    O2 sat 90% on room air on arrival  92% intermittently  Abdominal:      Palpations: Abdomen is soft. Tenderness: There is no abdominal tenderness. Musculoskeletal:         General: No swelling. Skin:     General: Skin is warm and dry. Findings: No rash.    Neurological:      General: No focal deficit present. Mental Status: He is alert. Psychiatric:         Mood and Affect: Mood normal.         Behavior: Behavior normal.           DIFFERENTIAL DIAGNOSIS:     Respiratory infection, chest congestion cough fever    Suggestive of Covid infection    Type II diabetic check sugar, check hydration        DIAGNOSTIC RESULTS     EKG: All EKG's are interpreted by the Emergency Department Physician who either signs or Co-signs this chart in the absence of a cardiologist.    EKG showed sinus rhythm with rate 112. QRS complexes show normal axis, 106 ms conduction. ST-T waves show no acute change        RADIOLOGY: non-plain film images(s) such as CT, Ultrasound and MRI are read by the radiologist.  The patient had a single view X-ray of the chest which demonstrates bibasilar changes consistent with patchy infiltrates suggestive of Covid infection. [x] Visualized and interpreted by me   [x] Radiologist's Wet Read Report Reviewed   [] Discussed with Radiologist.    LABS:   Labs Reviewed   CBC WITH AUTO DIFFERENTIAL - Abnormal; Notable for the following components:       Result Value    WBC 4.4 (*)     RDW-CV 14.6 (*)     RDW-SD 46.9 (*)     MPV 12.8 (*)     All other components within normal limits   C-REACTIVE PROTEIN - Abnormal; Notable for the following components:    CRP 9.39 (*)     All other components within normal limits   LACTATE DEHYDROGENASE - Abnormal; Notable for the following components:     (*)     All other components within normal limits   HEPATIC FUNCTION PANEL - Abnormal; Notable for the following components:     Total Protein 8.2 (*)     All other components within normal limits   BASIC METABOLIC PANEL - Abnormal; Notable for the following components:    Glucose 115 (*)     All other components within normal limits   FERRITIN - Abnormal; Notable for the following components:    Ferritin 1,013 (*)     All other components within normal limits   COVID-19 - Abnormal; Notable for the following components:    SARS-CoV-2, NAAT DETECTED (*)     All other components within normal limits   GLOMERULAR FILTRATION RATE, ESTIMATED - Abnormal; Notable for the following components:    Est, Glom Filt Rate 76 (*)     All other components within normal limits   APTT   PROTIME-INR   BRAIN NATRIURETIC PEPTIDE   LACTIC ACID, PLASMA   LIPASE   TROPONIN   PROCALCITONIN   ANION GAP   OSMOLALITY   SCAN OF BLOOD SMEAR       EMERGENCY DEPARTMENT COURSE:   Vitals:    Vitals:    11/27/20 1206 11/27/20 1417 11/27/20 1423   BP: 127/75     Pulse: 116  111   Resp: 16  30   Temp: 98.5 °F (36.9 °C)     TempSrc: Oral     SpO2: 90% 90% 97%   Weight: (!) 356 lb (161.5 kg)     Height: 6' 4\" (1.93 m)       Nursing notes reviewed    IV hydration    Covid-19 positive    WBC 4400    CRP 9.4        Ferritin elevated    O2 sat was 90% room air    Given supplemental oxygen    Remains tachycardic at 111    Respiratory rate of 30    Admit      CRITICAL CARE:   none    CONSULTS:  Dr. Francia Zhong:  None    FINAL IMPRESSION      1. COVID-19 virus infection          DISPOSITION/PLAN   Admit      PATIENT REFERRED TO:  No follow-up provider specified.     DISCHARGE MEDICATIONS:  New Prescriptions    No medications on file       (Please note that portions of this note were completed with a voice recognition program.  Efforts were made to edit the dictations but occasionally words are mis-transcribed.)    MD Angel Vasques MD  11/27/20 6649

## 2020-11-27 NOTE — ED NOTES
Patient presents to the ED with complaints of a cough that has been ongoing for a week. States he has been taking Nyquil but it does not help.      Nazia Polk  11/27/20 8852

## 2020-11-28 VITALS
TEMPERATURE: 98.2 F | WEIGHT: 315 LBS | OXYGEN SATURATION: 94 % | RESPIRATION RATE: 20 BRPM | HEART RATE: 74 BPM | DIASTOLIC BLOOD PRESSURE: 85 MMHG | SYSTOLIC BLOOD PRESSURE: 127 MMHG | BODY MASS INDEX: 38.36 KG/M2 | HEIGHT: 76 IN

## 2020-11-28 LAB
ABO: NORMAL
ALBUMIN SERPL-MCNC: 3.7 G/DL (ref 3.5–5.1)
ALP BLD-CCNC: 44 U/L (ref 38–126)
ALT SERPL-CCNC: 26 U/L (ref 11–66)
ANION GAP SERPL CALCULATED.3IONS-SCNC: 13 MEQ/L (ref 8–16)
ANTIBODY SCREEN: NORMAL
APTT: 32.8 SECONDS (ref 22–38)
AST SERPL-CCNC: 26 U/L (ref 5–40)
ATYPICAL LYMPHOCYTES: ABNORMAL %
BASOPHILS # BLD: 0 %
BASOPHILS ABSOLUTE: 0 THOU/MM3 (ref 0–0.1)
BILIRUB SERPL-MCNC: 0.4 MG/DL (ref 0.3–1.2)
BILIRUBIN DIRECT: < 0.2 MG/DL (ref 0–0.3)
BUN BLDV-MCNC: 15 MG/DL (ref 7–22)
CALCIUM SERPL-MCNC: 8.7 MG/DL (ref 8.5–10.5)
CHLORIDE BLD-SCNC: 102 MEQ/L (ref 98–111)
CO2: 24 MEQ/L (ref 23–33)
CREAT SERPL-MCNC: 0.9 MG/DL (ref 0.4–1.2)
EOSINOPHIL # BLD: 0 %
EOSINOPHILS ABSOLUTE: 0 THOU/MM3 (ref 0–0.4)
ERYTHROCYTE [DISTWIDTH] IN BLOOD BY AUTOMATED COUNT: 14.4 % (ref 11.5–14.5)
ERYTHROCYTE [DISTWIDTH] IN BLOOD BY AUTOMATED COUNT: 47.2 FL (ref 35–45)
FIBRINOGEN: 531 MG/100ML (ref 155–475)
GFR SERPL CREATININE-BSD FRML MDRD: > 90 ML/MIN/1.73M2
GLUCOSE BLD-MCNC: 135 MG/DL (ref 70–108)
GLUCOSE BLD-MCNC: 135 MG/DL (ref 70–108)
GLUCOSE BLD-MCNC: 176 MG/DL (ref 70–108)
HCT VFR BLD CALC: 40 % (ref 42–52)
HEMOGLOBIN: 12.7 GM/DL (ref 14–18)
IMMATURE GRANS (ABS): 0.01 THOU/MM3 (ref 0–0.07)
IMMATURE GRANULOCYTES: 0.4 %
INR BLD: 1.02 (ref 0.85–1.13)
LYMPHOCYTES # BLD: 26.8 %
LYMPHOCYTES ABSOLUTE: 0.6 THOU/MM3 (ref 1–4.8)
MCH RBC QN AUTO: 28.6 PG (ref 26–33)
MCHC RBC AUTO-ENTMCNC: 31.8 GM/DL (ref 32.2–35.5)
MCV RBC AUTO: 90.1 FL (ref 80–94)
MONOCYTES # BLD: 11.1 %
MONOCYTES ABSOLUTE: 0.3 THOU/MM3 (ref 0.4–1.3)
NUCLEATED RED BLOOD CELLS: 0 /100 WBC
OSMOLALITY CALCULATION: 280.4 MOSMOL/KG (ref 275–300)
PLATELET # BLD: 120 THOU/MM3 (ref 130–400)
PMV BLD AUTO: 12.9 FL (ref 9.4–12.4)
POTASSIUM REFLEX MAGNESIUM: 4.8 MEQ/L (ref 3.5–5.2)
RBC # BLD: 4.44 MILL/MM3 (ref 4.7–6.1)
RH FACTOR: NORMAL
SCAN OF BLOOD SMEAR: NORMAL
SEG NEUTROPHILS: 61.7 %
SEGMENTED NEUTROPHILS ABSOLUTE COUNT: 1.5 THOU/MM3 (ref 1.8–7.7)
SODIUM BLD-SCNC: 139 MEQ/L (ref 135–145)
TOTAL PROTEIN: 7.1 G/DL (ref 6.1–8)
WBC # BLD: 2.4 THOU/MM3 (ref 4.8–10.8)

## 2020-11-28 PROCEDURE — 94640 AIRWAY INHALATION TREATMENT: CPT

## 2020-11-28 PROCEDURE — 82948 REAGENT STRIP/BLOOD GLUCOSE: CPT

## 2020-11-28 PROCEDURE — 80053 COMPREHEN METABOLIC PANEL: CPT

## 2020-11-28 PROCEDURE — 85730 THROMBOPLASTIN TIME PARTIAL: CPT

## 2020-11-28 PROCEDURE — 85385 FIBRINOGEN ANTIGEN: CPT

## 2020-11-28 PROCEDURE — P9059 PLASMA, FRZ BETWEEN 8-24HOUR: HCPCS

## 2020-11-28 PROCEDURE — 85025 COMPLETE CBC W/AUTO DIFF WBC: CPT

## 2020-11-28 PROCEDURE — 85610 PROTHROMBIN TIME: CPT

## 2020-11-28 PROCEDURE — 96372 THER/PROPH/DIAG INJ SC/IM: CPT

## 2020-11-28 PROCEDURE — G0378 HOSPITAL OBSERVATION PER HR: HCPCS

## 2020-11-28 PROCEDURE — 36415 COLL VENOUS BLD VENIPUNCTURE: CPT

## 2020-11-28 PROCEDURE — 36430 TRANSFUSION BLD/BLD COMPNT: CPT

## 2020-11-28 PROCEDURE — 93010 ELECTROCARDIOGRAM REPORT: CPT | Performed by: INTERNAL MEDICINE

## 2020-11-28 PROCEDURE — 6370000000 HC RX 637 (ALT 250 FOR IP): Performed by: INTERNAL MEDICINE

## 2020-11-28 PROCEDURE — 99239 HOSP IP/OBS DSCHRG MGMT >30: CPT | Performed by: INTERNAL MEDICINE

## 2020-11-28 PROCEDURE — 6360000002 HC RX W HCPCS: Performed by: INTERNAL MEDICINE

## 2020-11-28 PROCEDURE — 94761 N-INVAS EAR/PLS OXIMETRY MLT: CPT

## 2020-11-28 PROCEDURE — 2700000000 HC OXYGEN THERAPY PER DAY

## 2020-11-28 RX ORDER — DEXAMETHASONE 6 MG/1
6 TABLET ORAL DAILY
Qty: 3 TABLET | Refills: 0 | Status: SHIPPED | OUTPATIENT
Start: 2020-11-29 | End: 2020-12-02

## 2020-11-28 RX ORDER — ASCORBIC ACID 500 MG
500 TABLET ORAL DAILY
Qty: 30 TABLET | Refills: 3 | Status: SHIPPED | OUTPATIENT
Start: 2020-11-29

## 2020-11-28 RX ORDER — ZINC SULFATE 50(220)MG
50 CAPSULE ORAL DAILY
Qty: 14 CAPSULE | Refills: 0 | COMMUNITY
Start: 2020-11-29 | End: 2021-05-11

## 2020-11-28 RX ADMIN — FOLIC ACID 1000 MCG: 1 TABLET ORAL at 09:56

## 2020-11-28 RX ADMIN — DEXAMETHASONE 6 MG: 4 TABLET ORAL at 09:56

## 2020-11-28 RX ADMIN — OXYCODONE HYDROCHLORIDE AND ACETAMINOPHEN 500 MG: 500 TABLET ORAL at 09:56

## 2020-11-28 RX ADMIN — CITALOPRAM 20 MG: 20 TABLET, FILM COATED ORAL at 09:56

## 2020-11-28 RX ADMIN — ASPIRIN 81 MG: 81 TABLET, COATED ORAL at 09:56

## 2020-11-28 RX ADMIN — Medication 2000 UNITS: at 09:56

## 2020-11-28 RX ADMIN — IPRATROPIUM BROMIDE AND ALBUTEROL SULFATE 1 AMPULE: .5; 3 SOLUTION RESPIRATORY (INHALATION) at 09:26

## 2020-11-28 RX ADMIN — Medication 50 MG: at 09:56

## 2020-11-28 RX ADMIN — ENOXAPARIN SODIUM 40 MG: 40 INJECTION SUBCUTANEOUS at 09:56

## 2020-11-28 RX ADMIN — CARVEDILOL 25 MG: 25 TABLET, FILM COATED ORAL at 09:56

## 2020-11-28 RX ADMIN — INSULIN LISPRO 1 UNITS: 100 INJECTION, SOLUTION INTRAVENOUS; SUBCUTANEOUS at 13:01

## 2020-11-28 RX ADMIN — ATORVASTATIN CALCIUM 20 MG: 20 TABLET, FILM COATED ORAL at 09:56

## 2020-11-28 RX ADMIN — CARBAMAZEPINE 200 MG: 200 TABLET ORAL at 09:56

## 2020-11-28 RX ADMIN — ORPHENADRINE CITRATE 100 MG: 100 TABLET, EXTENDED RELEASE ORAL at 09:56

## 2020-11-28 ASSESSMENT — PAIN SCALES - GENERAL: PAINLEVEL_OUTOF10: 0

## 2020-11-28 NOTE — ED NOTES
ED to inpatient nurses report    Chief Complaint   Patient presents with    Cough      Present to ED from home  LOC: alert and orientated to name, place, date  Vital signs   Vitals:    11/27/20 1715 11/27/20 1720 11/27/20 1827 11/27/20 1929   BP: 121/77 121/77     Pulse: 108 108 97 89   Resp:  (!) 34 (!) 36 (!) 32   Temp:       TempSrc:       SpO2:  97% 97% 98%   Weight:       Height:          Oxygen Baseline room air    Current needs required 5L NC Bipap/Cpap No  LDAs:   Peripheral IV 11/27/20 Right Antecubital (Active)   Site Assessment Clean;Dry; Intact 11/27/20 1513   Line Status Infusing 11/27/20 1513   Dressing Status Clean;Dry; Intact 11/27/20 1513   Dressing Intervention New 11/27/20 1424     Mobility: Independent  Pending ED orders: none at this time  Present condition: patient resting in cot with call light in reach. IV infusing per orders. Will continue to monitor.     Electronically signed by Rnadee Barboza RN on 11/27/2020 at 7:35 PM     Bao Barrera RN  11/27/20 3509

## 2020-11-28 NOTE — PROGRESS NOTES
Pt admitted to  3B36 from ED. Complaints: Shortness of breath. IV none infusing into the forearm left, condition patent and no redness. Vital signs obtained. Assessment and data collection initiated. Two nurse skin assessment performed by Kenn Catherine and Kell Reed RN. Oriented to room. Policies and procedures for 3B explained. Adriana Mejias RN discussed hourly rounding with patient addressing 5 P's. Fall prevention and safety brochure discussed with patient. Bed alarm on. Call light in reach. The best day to schedule a follow up Dr appointment is:  Monday p.m. Explained patients right to have family, representative or physician notified of their admission. Patient has Declined for physician to be notified. Patient has Declined for family/representative to be notified. All questions answered with no further questions at this time.

## 2020-11-28 NOTE — DISCHARGE SUMMARY
Hospital Medicine Discharge Summary      Patient Identification:   Lamont Jenkins   : 1965  MRN: [id number]   Account: [de-identified]      Patient's PCP: Ophelia Bowers MD    Admit Date: 2020     Discharge Date:   20    Admitting Physician: No admitting provider for patient encounter. Discharge Physician: Joselito Joiner MD     Discharge Diagnoses: COVID 19 PNA    Active Hospital Problems    Diagnosis Date Noted    COVID-19 [U07.1] 2020       The patient was seen and examined on day of discharge and this discharge summary is in conjunction with any daily progress note from day of discharge. Hospital Course:   Lamont Jenkins is a 54 y.o. male admitted to [facility] on 2020 for sob. Pt has a past medical history of COPD, CHF with preserved EF, KRISTEN on BiPAP, morbid obesity, borderline diabetes came to ER due to shortness of breath, nausea, vomiting, diarrhea, fevers. Patient states that his symptoms started about a week ago and progressively worsened. In ER, patient was requiring supplemental oxygen for hypoxia. Was also tachypneic. Labs showed slight elevation in creatinine above baseline. Patient Covid came back positive. Chest x-ray showed bilateral interstitial opacities concerning for pneumonia typical versus atypical. Pt started on Plasma, Decadron, Remsidivir. Improved, weaned down to RA. Pt ambulating with no difficulty. Tolerating PO intake. HD stable. Will discharge home with close PCP follow-up.        Exam:     Vitals:  Vitals:    20 0827 20 0927 20 1128 20 1130   BP: 126/84  127/85    Pulse: 60  74    Resp: 18  20    Temp: 98.2 °F (36.8 °C)  98.2 °F (36.8 °C)    TempSrc: Oral  Axillary    SpO2: 98% 93% 94% 94%   Weight:       Height:         Weight: Weight: (!) 346 lb 14.4 oz (157.4 kg)     24 hour intake/output:    Intake/Output Summary (Last 24 hours) at 2020 1509  Last data filed at 2020 1435  Gross per 24 hour Intake 1900.01 ml   Output 575 ml   Net 1325.01 ml         Labs: For convenience and continuity at follow-up the following most recent labs are provided:      CBC:    Lab Results   Component Value Date    WBC 2.4 11/28/2020    HGB 12.7 11/28/2020    HCT 40.0 11/28/2020     11/28/2020       Renal:    Lab Results   Component Value Date     11/28/2020    K 4.8 11/28/2020     11/28/2020    CO2 24 11/28/2020    BUN 15 11/28/2020    CREATININE 0.9 11/28/2020    CALCIUM 8.7 11/28/2020    PHOS 5.2 02/17/2014         Significant Diagnostic Studies    Radiology:   XR CHEST PORTABLE   Final Result   Bilateral mixed airspace interstitial opacities concerning for pneumonia. Atypical or viral type pneumonia should be considered. .                **This report has been created using voice recognition software. It may contain minor errors which are inherent in voice recognition technology. **      Final report electronically signed by Dr. Cristina Stovall on 11/27/2020 12:52 PM             Consults:     STR ED TO IP CONSULT  PHARMACY TO DOSE MEDICATION    Disposition:    [x] Home       [] TCU       [] Rehab       [] Psych       [] SNF       [] [place]       [] Other-    Condition at Discharge: Stable    Code Status:  Full Code     Patient Instructions: Activity: activity as tolerated  Diet: DIET LOW SODIUM 2 GM; Follow-up visits:   No follow-up provider specified. Discharge Medications:      Tracy Shmuel   Home Medication Instructions HTS:[id number]    Printed on:11/28/20 4821   Medication Information                      acetaminophen (APAP EXTRA STRENGTH) 500 MG tablet  Take 1 tablet by mouth every 6 hours as needed for Pain             allopurinol (ZYLOPRIM) 300 MG tablet  Take 1 tablet by mouth daily             aspirin EC 81 MG EC tablet  Take 81 mg by mouth daily.  With food; blood thinner             atorvastatin (LIPITOR) 20 MG tablet  TAKE 1 TABLET BY MOUTH ONE TIME A DAY Blood Pressure KIT  This is a prescription for a blood pressure cuff for at home use. bumetanide (BUMEX) 0.5 MG tablet  Take 2 tablets daily. If weight gain 3Ibs in 1 day or 5Ibs in 1 week, take 2 tablets in AM and PM as needed. carBAMazepine (TEGRETOL) 200 MG tablet  Take 200 mg by mouth 2 times daily              carvedilol (COREG) 25 MG tablet  Take 1 tablet by mouth 2 times daily             Cholecalciferol (VITAMIN D3) 50 MCG (2000 UT) CAPS  Take by mouth 2 times daily             citalopram (CELEXA) 20 MG tablet  Take 1 tablet by mouth daily. dexamethasone (DECADRON) 6 MG tablet  Take 1 tablet by mouth daily for 3 days             dicyclomine (BENTYL) 10 MG capsule  Take 1 capsule by mouth every 6 hours as needed (cramps)             Folic Acid (FOLATE PO)  Take by mouth 2 times daily 1333mcg tablets             ketorolac (TORADOL) 10 MG tablet  Take 1 tablet by mouth every 6 hours as needed for Pain             meclizine (ANTIVERT) 25 MG tablet  Take 1 tablet by mouth 3 times daily as needed             metFORMIN (GLUCOPHAGE-XR) 500 MG extended release tablet  Take 500 mg by mouth daily (with breakfast)             ondansetron (ZOFRAN ODT) 4 MG disintegrating tablet  Take 1 tablet by mouth every 8 hours as needed for Nausea             orphenadrine (NORFLEX) 100 MG extended release tablet  Take 1 tablet by mouth 2 times daily             ranitidine (ZANTAC) 150 MG tablet  TAKE ONE TABLET BY MOUTH TWICE DAILY             risperiDONE (RISPERDAL) 2 MG tablet  Take 2 mg by mouth nightly              spironolactone (ALDACTONE) 25 MG tablet  Take 0.5 tablets by mouth daily             traMADol (ULTRAM) 50 MG tablet  Take 50 mg by mouth every 8 hours as needed for Pain. .             vitamin C (VITAMIN C) 500 MG tablet  Take 1 tablet by mouth daily             zinc sulfate (ZINCATE) 220 (50 Zn) MG capsule  Take 1 capsule by mouth daily for 14 days                 Time Spent on discharge is more than 30 minutes in the examination, evaluation, counseling and review of medications and discharge plan. Signed: Thank you Lev Elias MD for the opportunity to be involved in this patient's care.     Electronically signed by Niles Kelley MD on 11/28/2020 at 3:09 PM

## 2020-11-28 NOTE — PROGRESS NOTES
Patient discharged home in stable condition on room air with mother. Discharge instructions, medication administration, and discharge instructions reviewed with patient, denies any questions or concerns at this time.

## 2020-11-30 ENCOUNTER — CARE COORDINATION (OUTPATIENT)
Dept: CASE MANAGEMENT | Age: 55
End: 2020-11-30

## 2020-11-30 NOTE — CARE COORDINATION
[facility] Transitions Initial Follow Up Call    Call within 2 business days of discharge: Yes    Patient: [place]iley [place] Patient : 1965   MRN: [id number]  Reason for Admission: covid  Discharge Date: 20 RARS: Readmission Risk Score: 16      Last Discharge [facility]       Complaint Diagnosis Description Type Department Provider    20 Cough COVID-19 virus infection ED to Hosp-Admission (Discharged) (ADMITTED) [facility] Dona Carey MD; Irma Luis. .. Attempted covid+ call. Phone rings. No VM.  Unable to leave a message    Follow Up  Future Appointments   Date Time Provider Willam [place]i   2/3/2021  [address] Rosas MD [facility] Heart [address]   2021  9:15 AM MARCO Segura Si, CNP [facility] CHF [address]   10/15/2021  8:00 AM MARCO Vance CNP Pulm Med [facility] Mo, RN

## 2020-12-01 ENCOUNTER — CARE COORDINATION (OUTPATIENT)
Dept: CASE MANAGEMENT | Age: 55
End: 2020-12-01

## 2020-12-01 NOTE — CARE COORDINATION
Patient contacted regarding TSHDP-30 diagnosis\". Discussed COVID-19 related testing which was available at this time. Test results were positive on 20. Patient informed of results, if available? Yes    Care Transition Nurse/ Ambulatory Care Manager contacted the patient by telephone to perform post discharge assessment. Call within 2 business days of discharge: Yes. Verified name and  with patient as identifiers. Provided introduction to self, and explanation of the CTN/ACM role, and reason for call due to risk factors for infection and/or exposure to COVID-19. Symptoms reviewed with patient who verbalized the following symptoms: fatigue, pain or aching joints, loss of taste or smell, sweating, no new symptoms and no worsening symptoms. Due to no new or worsening symptoms encounter was not routed to provider for escalation. Discussed follow-up appointments. If no appointment was previously scheduled, appointment scheduling offered: Yes and CTN called Dr Margarita Sarabia office they will call the pt  [facility] follow up appointment(s):   Future Appointments   Date Time Provider [facility]   2/3/2021  [address], MD [address] [facility]   2021  9:15 AM Michaela Duncan, APRN - CNP [facility] [place]  [facility]   10/15/2021  8:00 AM MARCO Oconnell - [address] [address] [address] Rivera follow up appointment(s): CTN called PCP scheduling, that office will call the pt    Non-face-to-face services provided:  Scheduled appointment with PCP-attempted to schedule  Obtained and reviewed discharge summary and/or continuity of care documents     Advance Care Planning:   Does patient have an Advance Directive:  decision maker updated. Patient has following risk factors of: COVID POSITIVE. CTN/ACM reviewed discharge instructions, medical action plan and red flags such as increased shortness of breath, increasing fever and signs of decompensation with patient who verbalized understanding. Discussed exposure protocols and quarantine with CDC Guidelines What to do if you are sick with coronavirus disease 2019.  Patient was given an opportunity for questions and concerns. The patient agrees to contact the [facility] exposure line [phone], local [facility] department [place] [facility]: ([phone]) and PCP office for questions related to their healthcare. CTN/ACM provided contact information for future needs. Reviewed and educated patient on any new and changed medications related to discharge diagnosis  Pt stated he needs to  the Zinc [employer] Decadron today, was not in stock yesterday. Patient/family/caregiver given information for [employer] and agrees to enroll no    Plan for follow-up call in 5-7 days based on severity of symptoms and risk factors. Called pt for the COVID F/U call. Pt stated he feels better. Pt stated he is a little tired, with some aching. Pt stated taste is returning, & other symptoms have improved. Pt denied any needs or concerns. CTN will continue to follow.     [address] Transition Nurse  [phone]

## 2020-12-08 ENCOUNTER — CARE COORDINATION (OUTPATIENT)
Dept: CASE MANAGEMENT | Age: 55
End: 2020-12-08

## 2020-12-08 NOTE — CARE COORDINATION
[facility] Transitions Follow Up Call    2020    Patient: Jazz Ward  Patient : 1965   MRN: [id number]  Reason for Admission: covid  Discharge Date: 20 RARS: Readmission Risk Score: 16         Needs to be reviewed by the provider   Additional needs identified to be addressed with provider No  none  Discussed COVID-19 related testing which was available at this time. Test results were positive. Patient informed of results, if available? Yes         Method of communication with provider : none    Care Transition Nurse (CTN) contacted the patient by telephone to follow up. Verified name and  with patient as identifiers. Addressed changes since last contact: patient states he is doing well. Denies SOB, cough, fever and chills. He is still alittle tired but doing much better. Denies concerns or issues at this time. Discharged needs reviewed: none  Follow up appointment completed? No    Advance Care Planning:   Does patient have an Advance Directive:  not on file. CTN reviewed discharge instructions, medical action plan and red flags with patient and discussed any barriers to care and/or understanding of plan of care after discharge. Discussed appropriate site of care based on symptoms and resources available to patient including: PCP. The patient agrees to contact the PCP office for questions related to their healthcare. Patients top risk factors for readmission: medical condition  Interventions to address risk factors: Obtained and reviewed discharge summary and/or continuity of care documents    CTN provided contact information for future needs. Care Transitions Subsequent and Final Call    Subsequent and Final Calls  Care Transitions Interventions  Other Interventions:             Follow Up  Future Appointments   Date Time Provider Willam Lezama   2/3/2021  [address] Heart [facility] [place]   2021  9:15 AM MARCO Shah - TROY MARTIN

## 2021-01-20 ENCOUNTER — NURSE ONLY (OUTPATIENT)
Dept: LAB | Age: 56
End: 2021-01-20

## 2021-01-20 LAB
ALBUMIN SERPL-MCNC: 4.3 G/DL (ref 3.5–5.1)
ALP BLD-CCNC: 88 U/L (ref 38–126)
ALT SERPL-CCNC: 24 U/L (ref 11–66)
ANION GAP SERPL CALCULATED.3IONS-SCNC: 12 MEQ/L (ref 8–16)
AST SERPL-CCNC: 19 U/L (ref 5–40)
AVERAGE GLUCOSE: 117 MG/DL (ref 70–126)
BACTERIA: NORMAL
BASOPHILS # BLD: 0.7 %
BASOPHILS ABSOLUTE: 0 THOU/MM3 (ref 0–0.1)
BILIRUB SERPL-MCNC: 0.8 MG/DL (ref 0.3–1.2)
BILIRUBIN URINE: NEGATIVE
BLOOD, URINE: NEGATIVE
BUN BLDV-MCNC: 12 MG/DL (ref 7–22)
CALCIUM SERPL-MCNC: 9.6 MG/DL (ref 8.5–10.5)
CASTS: NORMAL /LPF
CASTS: NORMAL /LPF
CHARACTER, URINE: CLEAR
CHLORIDE BLD-SCNC: 103 MEQ/L (ref 98–111)
CHOLESTEROL, TOTAL: 156 MG/DL (ref 100–199)
CO2: 28 MEQ/L (ref 23–33)
COLOR: YELLOW
CREAT SERPL-MCNC: 0.8 MG/DL (ref 0.4–1.2)
CREATININE, URINE: 53.9 MG/DL
CRYSTALS: NORMAL
EOSINOPHIL # BLD: 3.4 %
EOSINOPHILS ABSOLUTE: 0.1 THOU/MM3 (ref 0–0.4)
EPITHELIAL CELLS, UA: NORMAL /HPF
ERYTHROCYTE [DISTWIDTH] IN BLOOD BY AUTOMATED COUNT: 15.9 % (ref 11.5–14.5)
ERYTHROCYTE [DISTWIDTH] IN BLOOD BY AUTOMATED COUNT: 52.5 FL (ref 35–45)
GFR SERPL CREATININE-BSD FRML MDRD: > 90 ML/MIN/1.73M2
GLUCOSE BLD-MCNC: 101 MG/DL (ref 70–108)
GLUCOSE, URINE: NEGATIVE MG/DL
HBA1C MFR BLD: 5.9 % (ref 4.4–6.4)
HCT VFR BLD CALC: 45.5 % (ref 42–52)
HDLC SERPL-MCNC: 44 MG/DL
HEMOGLOBIN: 14.5 GM/DL (ref 14–18)
IMMATURE GRANS (ABS): 0.01 THOU/MM3 (ref 0–0.07)
IMMATURE GRANULOCYTES: 0.2 %
KETONES, URINE: NEGATIVE
LDL CHOLESTEROL CALCULATED: 91 MG/DL
LEUKOCYTE ESTERASE, URINE: NEGATIVE
LYMPHOCYTES # BLD: 29.7 %
LYMPHOCYTES ABSOLUTE: 1.3 THOU/MM3 (ref 1–4.8)
MCH RBC QN AUTO: 28.9 PG (ref 26–33)
MCHC RBC AUTO-ENTMCNC: 31.9 GM/DL (ref 32.2–35.5)
MCV RBC AUTO: 90.6 FL (ref 80–94)
MICROALBUMIN UR-MCNC: < 1.2 MG/DL
MICROALBUMIN/CREAT UR-RTO: 22 MG/G (ref 0–30)
MISCELLANEOUS LAB TEST RESULT: NORMAL
MONOCYTES # BLD: 8.3 %
MONOCYTES ABSOLUTE: 0.4 THOU/MM3 (ref 0.4–1.3)
NITRITE, URINE: NEGATIVE
NUCLEATED RED BLOOD CELLS: 0 /100 WBC
PH UA: 5.5 (ref 5–9)
PLATELET # BLD: 148 THOU/MM3 (ref 130–400)
PMV BLD AUTO: 13.2 FL (ref 9.4–12.4)
POTASSIUM SERPL-SCNC: 3.8 MEQ/L (ref 3.5–5.2)
PROSTATE SPECIFIC ANTIGEN: 1 NG/ML (ref 0–1)
PROTEIN UA: NEGATIVE MG/DL
RBC # BLD: 5.02 MILL/MM3 (ref 4.7–6.1)
RBC URINE: NORMAL /HPF
RENAL EPITHELIAL, UA: NORMAL
SEG NEUTROPHILS: 57.7 %
SEGMENTED NEUTROPHILS ABSOLUTE COUNT: 2.5 THOU/MM3 (ref 1.8–7.7)
SODIUM BLD-SCNC: 143 MEQ/L (ref 135–145)
SPECIFIC GRAVITY UA: 1.01 (ref 1–1.03)
TOTAL PROTEIN: 7.9 G/DL (ref 6.1–8)
TRIGL SERPL-MCNC: 104 MG/DL (ref 0–199)
TSH SERPL DL<=0.05 MIU/L-ACNC: 0.94 UIU/ML (ref 0.4–4.2)
URIC ACID: 6.1 MG/DL (ref 3.7–7)
UROBILINOGEN, URINE: 0.2 EU/DL (ref 0–1)
VITAMIN D 25-HYDROXY: 39 NG/ML (ref 30–100)
WBC # BLD: 4.4 THOU/MM3 (ref 4.8–10.8)
WBC UA: NORMAL /HPF
YEAST: NORMAL

## 2021-04-08 ENCOUNTER — OFFICE VISIT (OUTPATIENT)
Dept: CARDIOLOGY CLINIC | Age: 56
End: 2021-04-08
Payer: MEDICARE

## 2021-04-08 VITALS
WEIGHT: 315 LBS | DIASTOLIC BLOOD PRESSURE: 70 MMHG | HEIGHT: 76 IN | HEART RATE: 82 BPM | BODY MASS INDEX: 38.36 KG/M2 | SYSTOLIC BLOOD PRESSURE: 132 MMHG

## 2021-04-08 DIAGNOSIS — I10 ESSENTIAL HYPERTENSION: ICD-10-CM

## 2021-04-08 DIAGNOSIS — I42.0 DILATED CARDIOMYOPATHY (HCC): Primary | ICD-10-CM

## 2021-04-08 PROCEDURE — 99213 OFFICE O/P EST LOW 20 MIN: CPT | Performed by: NUCLEAR MEDICINE

## 2021-04-08 NOTE — PROGRESS NOTES
[address] [place] [address] [address]  [address]  [facility] [address]  Dept: [phone]  Dept Fax: [phone]  Loc: [phone]    Visit Date: 4/8/2021    Graciela Mason is a 64 y.o. male who presents todayfor:  Chief Complaint   Patient presents with    Check-Up    Congestive Heart Failure    Cardiomyopathy    Hypertension     Admitted for [facility]   Was sick   Did fair since then   Known CMP and HTN   Off of lisinopril for a while   Back on it   Some weight issues  BP is stable     HPI:  HPI  Past Medical History:   Diagnosis Date    Anxiety     Arrhythmia     Arthritis     CAD (coronary artery disease)     Chest pain     HX OF:    CHF (congestive heart failure) (HCC)     Chronic back pain     COPD (chronic obstructive pulmonary disease) (HCC)     Depression     Diverticula of colon     Fatigue     Heart attack ([address]     Hyperlipidemia     Hypertension     Panic attacks     Pneumonia     Sleep apnea     SOB (shortness of breath)     Spondylosis     sponylolisthesis L5    Swelling       Past Surgical History:   Procedure Laterality Date    CARDIAC CATHETERIZATION  01/30/2008    EF 20%    CARDIOVASCULAR STRESS TEST  02/16/2009    EF 52%    PRE-MALIGNANT / BENIGN SKIN LESION EXCISION  5/17/12    mole on abd-left arm-Dr. Bianca Beal    TRANSTHORACIC ECHOCARDIOGRAM  07/22/2011    EF 55-65%     Family History   Problem Relation Age of Onset    High Cholesterol Father     High Blood Pressure Father     Coronary Art Dis Father     Cancer Maternal Grandmother         colon     Social History     Tobacco Use    Smoking status: Passive Smoke Exposure - Never Smoker    Smokeless tobacco: Never Used   Substance Use Topics    Alcohol use: No     Alcohol/week: 0.0 standard drinks     Comment: quit drinking       Current Outpatient Medications   Medication Sig Dispense Refill    vitamin C (VITAMIN C) 500 MG tablet Take 1 tablet by mouth daily 30  aspirin EC 81 MG EC tablet Take 81 mg by mouth daily. With food; blood thinner      zinc sulfate (ZINCATE) 220 (50 Zn) MG capsule Take 1 capsule by mouth daily for 14 days 14 capsule 0     No current facility-administered medications for this visit. Allergies   Allergen Reactions    Losartan Potassium     Mobic [Meloxicam]      Health Maintenance   Topic Date Due    Hepatitis C screen  Never done    HIV screen  Never done    DTaP/Tdap/Td vaccine (1 - Tdap) 01/23/1984    Shingles Vaccine (1 of 2) Never done   [employer] Visit (AWV)  Never done    COVID-19 Vaccine (2 - Moderna 2-dose series) 04/16/2021    Colon cancer screen colonoscopy  10/19/2021    A1C test (Diabetic or Prediabetic)  01/20/2022    Lipid screen  01/20/2022    Potassium monitoring  01/20/2022    Creatinine monitoring  01/20/2022    Flu vaccine  Completed    Pneumococcal 0-64 years Vaccine  Completed    Hepatitis A vaccine  Aged Out    Hepatitis B vaccine  Aged Out    Hib vaccine  Aged Out    Meningococcal (ACWY) vaccine  Aged Out       Subjective:  Review of Systems  General:   No fever, no chills, No fatigue or weight loss  Pulmonary:    No dyspnea, no wheezing  Cardiac:    Denies recent chest pain,   GI:     No nausea or vomiting, no abdominal pain  Neuro:    No dizziness or light headedness,   Musculoskeletal:  No recent active issues  Extremities:   No edema, no obvious claudication       Objective:  Physical Exam  /70   Pulse 82   Ht 6' 4\" (1.93 m)   Wt (!) 352 lb 3.2 oz (159.8 kg)   BMI 42.87 kg/m²   General:   Well developed, well nourished  Lungs:   Clear to auscultation  Heart:    Normal S1 S2, Slight murmur. no rubs, no gallops  Abdomen:   Soft, non tender, no organomegalies, positive bowel sounds  Extremities:   No edema, no cyanosis, good peripheral pulses  Neurological:   Awake, alert, oriented.  No obvious focal deficits  Musculoskelatal:  No obvious deformities    Assessment:      Diagnosis Orders   1. Dilated cardiomyopathy ([facility] [facility]     2. Essential hypertension     as above  Cardiac fair for now    Plan:  No follow-ups on file. Cardiac fair   Continue risk factor modification and medical management  Thank you for allowing me to participate in the care of your patient. Please don't hesitate to contact me regarding any further issues related to the patient care    Orders Placed:  No orders of the defined types were placed in this encounter. Medications Prescribed:  No orders of the defined types were placed in this encounter. Discussed use, benefit, and side effects of prescribed medications. All patient questions answered. Pt voicedunderstanding. Instructed to continue current medications, diet and exercise. Continue risk factor modification and medical management. Patient agreed with treatment plan. Follow up as directed.     Electronically signedby Mel Le MD on 4/8/2021 at 8:01 AM

## 2021-05-11 ENCOUNTER — TELEPHONE (OUTPATIENT)
Dept: CARDIOLOGY CLINIC | Age: 56
End: 2021-05-11

## 2021-05-11 ENCOUNTER — OFFICE VISIT (OUTPATIENT)
Dept: CARDIOLOGY CLINIC | Age: 56
End: 2021-05-11
Payer: MEDICARE

## 2021-05-11 VITALS
BODY MASS INDEX: 38.36 KG/M2 | HEART RATE: 84 BPM | HEIGHT: 76 IN | DIASTOLIC BLOOD PRESSURE: 70 MMHG | WEIGHT: 315 LBS | SYSTOLIC BLOOD PRESSURE: 120 MMHG | OXYGEN SATURATION: 96 %

## 2021-05-11 DIAGNOSIS — I42.0 DILATED CARDIOMYOPATHY (HCC): ICD-10-CM

## 2021-05-11 DIAGNOSIS — I50.32 CHF (CONGESTIVE HEART FAILURE), NYHA CLASS II, CHRONIC, DIASTOLIC (HCC): Primary | ICD-10-CM

## 2021-05-11 DIAGNOSIS — E66.01 MORBID OBESITY (HCC): ICD-10-CM

## 2021-05-11 DIAGNOSIS — I10 ESSENTIAL HYPERTENSION: ICD-10-CM

## 2021-05-11 LAB
ANION GAP SERPL CALCULATED.3IONS-SCNC: 11 MEQ/L (ref 8–16)
BUN BLDV-MCNC: 20 MG/DL (ref 7–22)
CALCIUM SERPL-MCNC: 9.4 MG/DL (ref 8.5–10.5)
CHLORIDE BLD-SCNC: 102 MEQ/L (ref 98–111)
CO2: 27 MEQ/L (ref 23–33)
CREAT SERPL-MCNC: 1 MG/DL (ref 0.4–1.2)
GFR SERPL CREATININE-BSD FRML MDRD: > 90 ML/MIN/1.73M2
GLUCOSE BLD-MCNC: 105 MG/DL (ref 70–108)
POTASSIUM SERPL-SCNC: 4.4 MEQ/L (ref 3.5–5.2)
SODIUM BLD-SCNC: 140 MEQ/L (ref 135–145)

## 2021-05-11 PROCEDURE — 36415 COLL VENOUS BLD VENIPUNCTURE: CPT | Performed by: NURSE PRACTITIONER

## 2021-05-11 PROCEDURE — 99214 OFFICE O/P EST MOD 30 MIN: CPT | Performed by: NURSE PRACTITIONER

## 2021-05-11 ASSESSMENT — ENCOUNTER SYMPTOMS
SHORTNESS OF BREATH: 0
CHEST TIGHTNESS: 0
APNEA: 0
COUGH: 0
COLOR CHANGE: 0
ABDOMINAL PAIN: 0
ABDOMINAL DISTENTION: 0
NAUSEA: 0
WHEEZING: 0

## 2021-05-11 NOTE — PROGRESS NOTES
[facility]\""       Visit Date: 5/11/2021  Cardiologist:  Dr. Thierry Lopez  Primary Care Physician: Dr. Oracio Christie MD    Charlene Fields is a 64 y.o. male who presents today for:  Chief Complaint   Patient presents with    Congestive Heart Failure       HPI: Obtained from patient and chart    Charlene Fields is a 64 y.o. male who presents to the office for a follow up visit in the heart failure clinic. Hx HTN, HFpEF was 45% improved to 55%, KRISTEN, COPD, DM, COVID (Nov 2020). Papo Gipson will take an extra Bumex 1 mg once or twice a week because he feels like he is drinking to much fluid fluid. He has no swelling or bloating today. He sleeps in a bed. No CP. He can perform ADLs without SOB or fatigue. He is not compliant with his diet.        Accompanied by: self  Last hospital admission related to Heart Failure:  None   Chest Pain: no  Worsening SOB: no  Worsening Orthopnea/PND: no  Edema: no  Any extra diuretic use: no  Weight gain: no  Fatigue: no  Abdominal bloating: no  Appetite: good  KRISTEN: compliant with CPAP  Cough: no  Compliant checking home weight: no  Compliant checking blood pressure: no      Past Medical History:   Diagnosis Date    Anxiety     Arrhythmia     Arthritis     CAD (coronary artery disease)     Chest pain     HX OF:    CHF (congestive heart failure) (HCC)     Chronic back pain     COPD (chronic obstructive pulmonary disease) (HCC)     Depression     Diverticula of colon     Fatigue     Heart attack (HCC)     Hyperlipidemia     Hypertension     Panic attacks     Pneumonia     Sleep apnea     SOB (shortness of breath)     Spondylosis     sponylolisthesis L5    Swelling      Past Surgical History:   Procedure Laterality Date    CARDIAC CATHETERIZATION  01/30/2008    EF 20%    CARDIOVASCULAR STRESS TEST  02/16/2009    EF 52%    PRE-MALIGNANT / BENIGN SKIN LESION EXCISION  5/17/12    mole on abd-left arm-Dr. Priest Sensing    TRANSTHORACIC ECHOCARDIOGRAM 07/22/2011    EF 55-65%     Family History   Problem Relation Age of Onset    High Cholesterol Father     High Blood Pressure Father     Coronary Art Dis Father     Cancer Maternal Grandmother         colon     Social History     Tobacco Use    Smoking status: Passive Smoke Exposure - Never Smoker    Smokeless tobacco: Never Used   Substance Use Topics    Alcohol use: No     Alcohol/week: 0.0 standard drinks     Comment: quit drinking      Current Outpatient Medications   Medication Sig Dispense Refill    vitamin C (VITAMIN C) 500 MG tablet Take 1 tablet by mouth daily 30 tablet 3    ketorolac (TORADOL) 10 MG tablet Take 1 tablet by mouth every 6 hours as needed for Pain 15 tablet 0    Cholecalciferol (VITAMIN D3) 50 MCG (2000 UT) CAPS Take by mouth 2 times daily      Folic Acid (FOLATE PO) Take by mouth 2 times daily 1333mcg tablets      dicyclomine (BENTYL) 10 MG capsule Take 1 capsule by mouth every 6 hours as needed (cramps) 20 capsule 0    ondansetron (ZOFRAN ODT) 4 MG disintegrating tablet Take 1 tablet by mouth every 8 hours as needed for Nausea 20 tablet 0    traMADol (ULTRAM) 50 MG tablet Take 50 mg by mouth every 8 hours as needed for Pain. Pamela Gan metFORMIN (GLUCOPHAGE-XR) 500 MG extended release tablet Take 500 mg by mouth daily (with breakfast)      allopurinol (ZYLOPRIM) 300 MG tablet Take 1 tablet by mouth daily 30 tablet 5    atorvastatin (LIPITOR) 20 MG tablet TAKE 1 TABLET BY MOUTH ONE TIME A DAY 30 tablet 5    bumetanide (BUMEX) 0.5 MG tablet Take 2 tablets daily. If weight gain 3Ibs in 1 day or 5Ibs in 1 week, take 2 tablets in AM and PM as needed.  60 tablet 5    carvedilol (COREG) 25 MG tablet Take 1 tablet by mouth 2 times daily 60 tablet 5    ranitidine (ZANTAC) 150 MG tablet TAKE ONE TABLET BY MOUTH TWICE DAILY 60 tablet 5    spironolactone (ALDACTONE) 25 MG tablet Take 0.5 tablets by mouth daily 15 tablet 5    meclizine (ANTIVERT) 25 MG tablet Take 1 tablet by mouth 3 times daily as needed 60 tablet 5    carBAMazepine (TEGRETOL) 200 MG tablet Take 200 mg by mouth 2 times daily       risperiDONE (RISPERDAL) 2 MG tablet Take 2 mg by mouth nightly       citalopram (CELEXA) 20 MG tablet Take 1 tablet by mouth daily. 30 tablet 5    aspirin EC 81 MG EC tablet Take 81 mg by mouth daily. With food; blood thinner      acetaminophen (APAP EXTRA STRENGTH) 500 MG tablet Take 1 tablet by mouth every 6 hours as needed for Pain 120 tablet 0    Blood Pressure KIT This is a prescription for a blood pressure cuff for at home use. 1 kit 0     No current facility-administered medications for this visit. Allergies   Allergen Reactions    Losartan Potassium     Mobic [Meloxicam]        SUBJECTIVE:   Review of Systems   Constitutional: Negative for activity change, appetite change, fatigue and fever. HENT: Negative for congestion. Respiratory: Negative for apnea, cough, chest tightness, shortness of breath and wheezing. Cardiovascular: Negative for chest pain, palpitations and leg swelling. Gastrointestinal: Negative for abdominal distention, abdominal pain and nausea. Genitourinary: Negative for difficulty urinating and dysuria. Musculoskeletal: Negative for arthralgias and gait problem. Skin: Negative for color change. Neurological: Negative for dizziness, numbness and headaches. Psychiatric/Behavioral: Negative for agitation, confusion and sleep disturbance. The patient is not nervous/anxious. OBJECTIVE:   Today's Vitals:  /70   Pulse 84   Ht 6' 4\" (1.93 m)   Wt (!) 349 lb 12.8 oz (158.7 kg)   SpO2 96%   BMI 42.58 kg/m²     Physical Exam  Vitals signs reviewed. Constitutional:       Appearance: He is well-developed. HENT:      Head: Normocephalic and atraumatic. Eyes:      Pupils: Pupils are equal, round, and reactive to light. Neck:      Musculoskeletal: Normal range of motion. Vascular: No JVD.    Cardiovascular:      Rate and Rhythm: Normal rate and regular rhythm. Heart sounds: Normal heart sounds. No murmur. Pulmonary:      Effort: Pulmonary effort is normal. No respiratory distress. Breath sounds: No rales. Abdominal:      General: There is no distension. Palpations: Abdomen is soft. Tenderness: There is no abdominal tenderness. Musculoskeletal:         General: No tenderness. Right lower leg: No edema. Left lower leg: No edema. Skin:     General: Skin is warm and dry. Capillary Refill: Capillary refill takes less than 2 seconds. Neurological:      Mental Status: He is alert and oriented to person, place, and time. Psychiatric:         Mood and Affect: Mood normal.         Behavior: Behavior normal.         Wt Readings from Last 3 Encounters:   05/11/21 (!) 349 lb 12.8 oz (158.7 kg)   04/08/21 (!) 352 lb 3.2 oz (159.8 kg)   11/27/20 (!) 346 lb 14.4 oz (157.4 kg)     BP Readings from Last 3 Encounters:   05/11/21 120/70   04/08/21 132/70   11/28/20 127/85     Pulse Readings from Last 3 Encounters:   05/11/21 84   04/08/21 82   11/28/20 74     Body mass index is 42.58 kg/m². ECHO:   2/8/19   Summary   Ejection fraction is visually estimated at 55%. Overall left ventricular function is normal.      Signature      ----------------------------------------------------------------   Electronically signed by Alban Borjas MD (Interpreting   physician) on 02/08/2019 at 04:44 PM   ----------------------------------------------------------------      Findings      Mitral Valve   The mitral valve structure was normal with normal leaflet separation. DOPPLER: The transmitral velocity was within the normal range with no   evidence for mitral stenosis. There was no evidence of mitral   regurgitation. Aortic Valve   The aortic valve was trileaflet with normal thickness and cuspal   separation. DOPPLER: Transaortic velocity was within the normal range with   no evidence of aortic stenosis.  There was no evidence of aortic   regurgitation. Tricuspid Valve   The tricuspid valve structure was normal with normal leaflet separation. DOPPLER: There was no evidence of tricuspid stenosis. There was no   evidence of tricuspid regurgitation. Pulmonic Valve   The pulmonic valve was not well visualized . Left Atrium   Left atrial size was normal.      Left Ventricle   Ejection fraction is visually estimated at 55%. Overall left ventricular function is normal.      Right Atrium   Right atrial size was normal.      Right Ventricle   Mildly dilated right ventricle. Pericardial Effusion   The pericardium was normal in appearance with no evidence of a pericardial   effusion. Pleural Effusion   No evidence of pleural effusion. Aorta / Great Vessels   -Aortic root dimension within normal limits.   -The Pulmonary artery is within normal limits. -IVC size is within normal limits with normal respiratory phasic changes.       Results reviewed:  BNP:   Lab Results   Component Value Date    BNP 11.7 02/16/2014    PROBNP 18.2 11/27/2020     CBC:   Lab Results   Component Value Date    WBC 4.4 01/20/2021    RBC 5.02 01/20/2021    RBC 4.76 09/14/2011    HGB 14.5 01/20/2021    HCT 45.5 01/20/2021     01/20/2021     CMP:    Lab Results   Component Value Date     01/20/2021    K 3.8 01/20/2021    K 4.8 11/28/2020     01/20/2021    CO2 28 01/20/2021    BUN 12 01/20/2021    CREATININE 0.8 01/20/2021    LABGLOM >90 01/20/2021    GLUCOSE 101 01/20/2021    GLUCOSE 107 05/16/2012    CALCIUM 9.6 01/20/2021     Hepatic Function Panel:    Lab Results   Component Value Date    ALKPHOS 88 01/20/2021    ALT 24 01/20/2021    AST 19 01/20/2021    PROT 7.9 01/20/2021    BILITOT 0.8 01/20/2021    BILIDIR <0.2 11/28/2020    LABALBU 4.3 01/20/2021    LABALBU 4.4 09/14/2011     Magnesium:    Lab Results   Component Value Date    MG 1.9 12/01/2019     PT/INR:    Lab Results   Component Value Date    INR 1.02 11/28/2020     Lipids:    Lab Results   Component Value Date    TRIG 104 01/20/2021    HDL 44 01/20/2021    LDLCALC 91 01/20/2021       ASSESSMENT AND PLAN:   The patient's condition/symptoms are Stable      Diagnosis Orders   1. CHF (congestive heart failure), NYHA class II, chronic, diastolic (HCC)  Basic Metabolic Panel   2. Essential hypertension     3. Morbid obesity ([facility] [address]     4. Dilated cardiomyopathy ([facility] [address]         Plan:  GDMT   ACE/ARB/ARNi: Lisinopril 10 mg daily  Beta Blocker: Coreg 25 mg bid  Aldosterone antagonist: Aldactone 12.5 mg daily  Diuretic/Potassium: Bumex 1 mg daily  Hydralazine/Nitrate: no  Other: ASA 81 mg, Lipitor  No signs of fluid overload. We discussed trying to lose wegith, incorporate more activity and weight loss. BMP today. He takes an extra Bumex 1 mg once or twice a week for \"blosting\" as he \"drinks more fluid some days\". We again dicussed not to take unless he feels he is retaining fluid, not because he \"drinks more. \" Further recs pending labs. HF Zones reviewed. Daily weights and record  Fluid restriction of 2 Liters per day (64 oz)   Limit sodium in diet to 1920-5520 mg/day  Healthier food options were discussed   Monitor BP daily 1 hour after meds are taken  Increase activity as tolerated     Patient was instructed to call the [employer] for changes in the following symptoms:   Weight gain of 3 pounds in 1 day or 5 pounds in 1 week  Increased shortness of breath  Shortness of breath while laying down  Cough  Chest pain  Swelling in feet, ankles or legs  Tenderness or bloating in the abdomen  Fatigue   Decreased appetite or feeling \"full\"  Nausea   Confusion      Return in about 6 months (around 11/11/2021). or sooner if needed     Patient given educational materials - see patient instructions. We discussed the importance of weighing oneself and recording daily.  We also discussed the importance of a low sodium diet, higher sodium foods to avoid and appropriate low sodium food choices. Discussed use, benefit, and side effects of prescribed medications. All patient questions answered. Patient verbalizes understanding of plan of care using teach back method, and is agreeable to the treatment plan. 30 minutes of time was spent reviewing the chart and educating the patient about HF, medications, diet, exercise, and discussing the plan of care. I personally spent more then 50% of the appt time face to face with the patient counseling/coordinating patient's care.       Electronicallysigned by MARCO Ridley CNP on 5/11/2021 at 9:33 AM

## 2021-10-06 NOTE — PROGRESS NOTES
Heart Failure Clinic       Visit Date: 10/7/2021  Cardiologist:  Dr. Woody Roe  Primary Care Physician: Dr. Dhiraj Patel MD    Ju Gage is a 64 y.o. male who presents today for:  Chief Complaint   Patient presents with    Congestive Heart Failure       HPI:   Ju Gage is a 64 y.o. male who presents to the office for a follow up patient visit in the heart failure clinic. Last seen by [facility] on 5/11, noted that patient takes extra Bumex a couple times a week.    Accompanied by self    TYPE HF: HFpEF (55% 2019) (45%, 2017)  Cause: nonischemic  Device: none  HX: HLD, HTN, CAD, KRISTEN on CPAP, COPD    Dry Wt:  350    Hospitalization:  > 6 months    Concerns today: has not needed to take an extra Bumex for a month, notes his chronic fatigue     Activity: ADLs performed, LAM w/ no breaks needed  Diet: uses no-salt, needs improvement on Na restrictions, stays under 64 oz    Patient has:  Chest Pain: no  SOB: chronic LAM  Orthopnea/PND: no  KRISTEN: yes, CPAP  Edema: no  Fatigue: chronic  Abdominal bloating: no  Cough: no  Appetite: good  Home weight: no scale  Home blood pressure: no cuff    Past Medical History:   Diagnosis Date    Anxiety     Arrhythmia     Arthritis     CAD (coronary artery disease)     Chest pain     HX OF:    CHF (congestive heart failure) (HCC)     Chronic back pain     COPD (chronic obstructive pulmonary disease) (HCC)     Depression     Diverticula of colon     Fatigue     Heart attack (HCC)     Hyperlipidemia     Hypertension     Panic attacks     Pneumonia     Sleep apnea     SOB (shortness of breath)     Spondylosis     sponylolisthesis L5    Swelling      Past Surgical History:   Procedure Laterality Date    CARDIAC CATHETERIZATION  01/30/2008    EF 20%    CARDIOVASCULAR STRESS TEST  02/16/2009    EF 52%    PRE-MALIGNANT / BENIGN SKIN LESION EXCISION  5/17/12    mole on abd-left arm-Dr. Huma Almonte    TRANSTHORACIC ECHOCARDIOGRAM  07/22/2011    EF 55-65%     Family History   Problem Relation Age of Onset    High Cholesterol Father     High Blood Pressure Father     Coronary Art Dis Father     Cancer Maternal Grandmother         colon     Social History     Tobacco Use    Smoking status: Passive Smoke Exposure - Never Smoker    Smokeless tobacco: Never Used   Substance Use Topics    Alcohol use: No     Alcohol/week: 0.0 standard drinks     Comment: quit drinking      Current Outpatient Medications   Medication Sig Dispense Refill    ibuprofen (ADVIL;MOTRIN) 800 MG tablet TAKE 1 TABLET BY MOUTH EVERY 8 HOURS AS NEEDED      lisinopril (PRINIVIL;ZESTRIL) 10 MG tablet TAKE 1 TABLET BY MOUTH ONCE DAILY      omeprazole (PRILOSEC) 20 MG delayed release capsule TAKE 1 CAPSULE BY MOUTH ONCE DAILY      vitamin C (VITAMIN C) 500 MG tablet Take 1 tablet by mouth daily 30 tablet 3    Folic Acid (FOLATE PO) Take by mouth 2 times daily 1333mcg tablets      dicyclomine (BENTYL) 10 MG capsule Take 1 capsule by mouth every 6 hours as needed (cramps) 20 capsule 0    ondansetron (ZOFRAN ODT) 4 MG disintegrating tablet Take 1 tablet by mouth every 8 hours as needed for Nausea 20 tablet 0    acetaminophen (APAP EXTRA STRENGTH) 500 MG tablet Take 1 tablet by mouth every 6 hours as needed for Pain 120 tablet 0    traMADol (ULTRAM) 50 MG tablet Take 50 mg by mouth every 8 hours as needed for Pain. Aramis Mcgarry metFORMIN (GLUCOPHAGE-XR) 500 MG extended release tablet Take 500 mg by mouth daily (with breakfast)      allopurinol (ZYLOPRIM) 300 MG tablet Take 1 tablet by mouth daily 30 tablet 5    atorvastatin (LIPITOR) 20 MG tablet TAKE 1 TABLET BY MOUTH ONE TIME A DAY 30 tablet 5    bumetanide (BUMEX) 0.5 MG tablet Take 2 tablets daily. If weight gain 3Ibs in 1 day or 5Ibs in 1 week, take 2 tablets in AM and PM as needed.  60 tablet 5    carvedilol (COREG) 25 MG tablet Take 1 tablet by mouth 2 times daily 60 tablet 5    spironolactone (ALDACTONE) 25 MG tablet Take 0.5 tablets by mouth daily 15 tablet 5    meclizine (ANTIVERT) 25 MG tablet Take 1 tablet by mouth 3 times daily as needed 60 tablet 5    carBAMazepine (TEGRETOL) 200 MG tablet Take 200 mg by mouth 2 times daily       Blood Pressure KIT This is a prescription for a blood pressure cuff for at home use. 1 kit 0    risperiDONE (RISPERDAL) 2 MG tablet Take 0.5 mg by mouth 2 times daily       citalopram (CELEXA) 20 MG tablet Take 1 tablet by mouth daily. 30 tablet 5    aspirin EC 81 MG EC tablet Take 81 mg by mouth daily. With food; blood thinner       No current facility-administered medications for this visit. Allergies   Allergen Reactions    Losartan Potassium     Mobic [Meloxicam]        SUBJECTIVE:   Review of Systems   Constitutional: Negative for activity change, appetite change, diaphoresis and fatigue. Respiratory: Positive for shortness of breath (chronic LAM). Negative for cough. Cardiovascular: Negative for chest pain, palpitations and leg swelling. Gastrointestinal: Negative for abdominal distention, nausea and vomiting. Neurological: Negative for weakness, light-headedness and headaches. Hematological: Negative for adenopathy. Psychiatric/Behavioral: Negative for sleep disturbance. OBJECTIVE:   Today's Vitals:  /84   Pulse 86   Ht 6' 4\" (1.93 m)   Wt (!) 354 lb 9.6 oz (160.8 kg)   SpO2 93%   BMI 43.16 kg/m²     Physical Exam  Vitals reviewed. Constitutional:       General: He is not in acute distress. Appearance: Normal appearance. He is well-developed. He is not diaphoretic. HENT:      Head: Normocephalic and atraumatic. Eyes:      Conjunctiva/sclera: Conjunctivae normal.   Cardiovascular:      Rate and Rhythm: Normal rate and regular rhythm. Heart sounds: Normal heart sounds. No murmur heard. Pulmonary:      Effort: Pulmonary effort is normal. No respiratory distress. Breath sounds: Normal breath sounds. No wheezing or rales.    Abdominal:      General: Bowel sounds consider an echo to assess LV function   Clinical correlation is recommended. Signatures      ----------------------------------------------------------------   Electronically signed by Jaylen Shay MD (Interpreting   Cardiologist) on 04/06/2016 at 07:46   ----------------------------------------------------------------        Results reviewed:  BNP:   Lab Results   Component Value Date    BNP 11.7 02/16/2014     CBC:   Lab Results   Component Value Date    WBC 4.4 01/20/2021    RBC 5.02 01/20/2021    RBC 4.76 09/14/2011    HGB 14.5 01/20/2021    HCT 45.5 01/20/2021     01/20/2021     CMP:    Lab Results   Component Value Date     05/11/2021    K 4.4 05/11/2021    K 4.8 11/28/2020     05/11/2021    CO2 27 05/11/2021    BUN 20 05/11/2021    CREATININE 1.0 05/11/2021    LABGLOM >90 05/11/2021    GLUCOSE 105 05/11/2021    GLUCOSE 107 05/16/2012    CALCIUM 9.4 05/11/2021     Hepatic Function Panel:    Lab Results   Component Value Date    ALKPHOS 88 01/20/2021    ALT 24 01/20/2021    AST 19 01/20/2021    PROT 7.9 01/20/2021    BILITOT 0.8 01/20/2021    BILIDIR <0.2 11/28/2020    LABALBU 4.3 01/20/2021    LABALBU 4.4 09/14/2011     Magnesium:    Lab Results   Component Value Date    MG 1.9 12/01/2019     PT/INR:    Lab Results   Component Value Date    INR 1.02 11/28/2020     Lipids:    Lab Results   Component Value Date    TRIG 104 01/20/2021    HDL 44 01/20/2021    [address] 01/20/2021       ASSESSMENT AND PLAN:   The patient's condition/symptoms are Stable: No clinical evidence of fluid overload today. Continue current medical regimen without changes at present time. Diagnosis Orders   1. Chronic diastolic congestive heart failure, NYHA class 2 (HCC)     2. Cardiomyopathy, nonischemic ([facility] [address]     3. Coronary artery disease involving native coronary artery of native heart without angina pectoris     4.  KRISTEN on CPAP       Continue:  GDMT:   ACE/ARB/ARNI - Lisinopril 10mg daily   BB -

## 2021-10-07 ENCOUNTER — OFFICE VISIT (OUTPATIENT)
Dept: CARDIOLOGY CLINIC | Age: 56
End: 2021-10-07
Payer: MEDICARE

## 2021-10-07 VITALS
HEIGHT: 76 IN | SYSTOLIC BLOOD PRESSURE: 124 MMHG | HEART RATE: 86 BPM | OXYGEN SATURATION: 93 % | WEIGHT: 315 LBS | BODY MASS INDEX: 38.36 KG/M2 | DIASTOLIC BLOOD PRESSURE: 84 MMHG

## 2021-10-07 DIAGNOSIS — I50.32 CHRONIC DIASTOLIC CONGESTIVE HEART FAILURE, NYHA CLASS 2 (HCC): Primary | ICD-10-CM

## 2021-10-07 DIAGNOSIS — Z99.89 OSA ON CPAP: ICD-10-CM

## 2021-10-07 DIAGNOSIS — I42.8 CARDIOMYOPATHY, NONISCHEMIC (HCC): ICD-10-CM

## 2021-10-07 DIAGNOSIS — I25.10 CORONARY ARTERY DISEASE INVOLVING NATIVE CORONARY ARTERY OF NATIVE HEART WITHOUT ANGINA PECTORIS: ICD-10-CM

## 2021-10-07 DIAGNOSIS — G47.33 OSA ON CPAP: ICD-10-CM

## 2021-10-07 PROCEDURE — 99214 OFFICE O/P EST MOD 30 MIN: CPT | Performed by: NURSE PRACTITIONER

## 2021-10-07 RX ORDER — LISINOPRIL 10 MG/1
TABLET ORAL
COMMUNITY
Start: 2021-08-09

## 2021-10-07 RX ORDER — IBUPROFEN 800 MG/1
TABLET ORAL
COMMUNITY
Start: 2021-09-28

## 2021-10-07 RX ORDER — OMEPRAZOLE 20 MG/1
CAPSULE, DELAYED RELEASE ORAL
COMMUNITY
Start: 2021-08-09 | End: 2022-06-21

## 2021-10-07 ASSESSMENT — ENCOUNTER SYMPTOMS
VOMITING: 0
SHORTNESS OF BREATH: 1
ABDOMINAL DISTENTION: 0
COUGH: 0
NAUSEA: 0

## 2021-10-07 NOTE — PATIENT INSTRUCTIONS
You may receive a survey regarding the care you received during your visit. Your input is valuable to us. We encourage you to complete and return your survey. We hope you will choose us in the future for your healthcare needs. Continue:  · Continue current medications  · Daily weights and record  · Fluid restriction of 2 Liters per day  · Limit sodium in diet to around 4986-8147 mg/day  · Monitor BP  · Activity as tolerated     Call the Heart Failure Clinic for any of the following symptoms: [phone]   Weight gain of 2-3 pounds in 1 day or 5 pounds in 1 week   Increased shortness of breath   Shortness of breath while laying down   Cough   Chest pain   Swelling in feet, ankles or legs   Tenderness or bloating in the abdomen   Fatigue    Decreased appetite or nausea    Confusion       Stable, appears Euvolemic  Lab reviewed - stable  Repeat blood work in 9 months  BP/HR stable   No med changes today   Continue diet/fluid adherence  Continue daily wts.   F/U w/ Cardiology  F/U in clinic in 9 months

## 2021-11-08 ENCOUNTER — TELEPHONE (OUTPATIENT)
Dept: PULMONOLOGY | Age: 56
End: 2021-11-08

## 2021-11-08 DIAGNOSIS — G47.33 OSA ON CPAP: Primary | ICD-10-CM

## 2021-11-08 DIAGNOSIS — Z99.89 OSA ON CPAP: Primary | ICD-10-CM

## 2021-11-08 NOTE — TELEPHONE ENCOUNTER
Pt is calling and is need of a new prescription for his cpap supplies.   He uses [employer].  Please advise pt at [phone]

## 2022-01-31 NOTE — PROGRESS NOTES
[facility]      Charlene Fields         [id number]  2/1/2022   Chief Complaint   Patient presents with    Follow-up     1 year KRISTEN follow up with [facility] download        Pt of Dr. Marjorie Aguilera    PAP Download:   Original or initial AHI: 99.4     Date of initial study: 5/9/16      Compliant  100%     Noncompliant 0 %     PAP Type cpap Level  10   Avg Hrs/Day 13hr 8 min  AHI: 4.9   Recorded compliance dates: 1/1/22-1/30/22  Machine/Mfg:   [x] ResMed    [] Respironics/Dreamstation   Interface:   [] Nasal    [x] Nasal pillows   [] FFM      Provider:      [x] -CHEYANNE     []Evgeny     [] Wilver    [] Dez Mcnair    [] Susu               [] [facility]      [] Adaptive    [] [address]      [] Other    Neck Size: 19.5  Mallampati IV  ESS:  12  SAQLI: 41    Here is a scan of the most recent download:            Presentation:   Kishor Coto presents for sleep medicine follow up for obstructive sleep apnea  Since the last visit, Kishor Coto reports that he is sleeping well with PAP therapy. Reports he is tolerating pressure well and mask is working well for him currently using nasal pillow interface and likes it. Denies difficulty falling asleep at night. Equipment issues: The pressure is  acceptable, the mask is acceptable     Sleep issues:  Do you feel better? Yes  More rested? Yes   Better concentration? yes    Progress History:   Since last visit any new medical issues? Yes Covid-19 in November 2020  New ER or hospital visits? Yes Covid November 2020 admitted for 2 days  Any new or changes in medicines? No  Any new sleep medicines? No    Review of Systems -   Review of Systems   Constitutional: Negative for chills, fever and unexpected weight change. Respiratory: Negative for cough, chest tightness, shortness of breath, wheezing and stridor. Cardiovascular: Negative for chest pain and leg swelling. Gastrointestinal: Negative for diarrhea, nausea and vomiting.    Genitourinary: Negative for dysuria. Physical Exam:    BMI:  Body mass index is 43.38 kg/m². Wt Readings from Last 3 Encounters:   02/01/22 (!) 356 lb 6.4 oz (161.7 kg)   10/07/21 (!) 354 lb 9.6 oz (160.8 kg)   05/11/21 (!) 349 lb 12.8 oz (158.7 kg)     362 Oct 14 2020    Weight lost 6  lbs over 14 months  Vitals: /80 (Site: Left Lower Arm, Position: Sitting, Cuff Size: Medium Adult)   Pulse 82   Temp 98.1 °F (36.7 °C) (Oral)   Ht 6' 4\" (1.93 m)   Wt (!) 356 lb 6.4 oz (161.7 kg)   SpO2 97%   BMI 43.38 kg/m²       Physical Exam  Vitals and nursing note reviewed. Constitutional:       General: He is not in acute distress. Appearance: He is well-developed. HENT:      Head: Normocephalic and atraumatic. Neck:      Trachea: No tracheal deviation. Cardiovascular:      Rate and Rhythm: Normal rate and regular rhythm. Heart sounds: Normal heart sounds. No murmur heard. Pulmonary:      Effort: Pulmonary effort is normal. No respiratory distress. Breath sounds: Normal breath sounds. No stridor. No wheezing or rales. Chest:      Chest wall: No tenderness. Abdominal:      General: Bowel sounds are normal.      Palpations: Abdomen is soft. Comments: Rounded   Musculoskeletal:      Cervical back: Neck supple. Skin:     General: Skin is warm and dry. Capillary Refill: Capillary refill takes less than 2 seconds. Neurological:      Mental Status: He is alert and oriented to person, place, and time. Psychiatric:         Behavior: Behavior normal.         Thought Content: Thought content normal.           ASSESSMENT/DIAGNOSIS     Diagnosis Orders   1. KRISTEN on CPAP  CPAP Machine MISC   2. Obesity, morbid, BMI 40.0-49.9 ([address]              Plan   Do you need any equipment today?  Yes CPAP machine reports that is has exceeded its motor life new order written today for CPAP machine at 10 CWP with downloadable data feature  - Advised to continue current positive airway pressure therapy with above described pressure. - Advised to keep good compliance with current recommended pressure to get optimal results and clinical improvement  - Recommend 7-9 hours of sleep with PAP  - Instructed to call DME company regarding supplies if needed.   -Patient to call my office for earlier appointment if needed for worsening of sleep symptoms.   -Discussed weight loss  - Educated about my impression and plan. Patient verbalizes understanding.   We will see back in: 1 year with download     Information added by my medical assistant/LPN was reviewed today      Electronically signed by MARCO Jo CNP on 2/1/2022 at 10:33 AM

## 2022-02-01 ENCOUNTER — OFFICE VISIT (OUTPATIENT)
Dept: PULMONOLOGY | Age: 57
End: 2022-02-01
Payer: MEDICARE

## 2022-02-01 VITALS
WEIGHT: 315 LBS | HEART RATE: 82 BPM | HEIGHT: 76 IN | SYSTOLIC BLOOD PRESSURE: 128 MMHG | OXYGEN SATURATION: 97 % | BODY MASS INDEX: 38.36 KG/M2 | DIASTOLIC BLOOD PRESSURE: 80 MMHG | TEMPERATURE: 98.1 F

## 2022-02-01 DIAGNOSIS — G47.33 OSA ON CPAP: Primary | ICD-10-CM

## 2022-02-01 DIAGNOSIS — E66.01 OBESITY, MORBID, BMI 40.0-49.9 (HCC): ICD-10-CM

## 2022-02-01 DIAGNOSIS — Z99.89 OSA ON CPAP: Primary | ICD-10-CM

## 2022-02-01 PROCEDURE — 99213 OFFICE O/P EST LOW 20 MIN: CPT | Performed by: NURSE PRACTITIONER

## 2022-02-01 RX ORDER — TRIAMCINOLONE ACETONIDE 1 MG/G
CREAM TOPICAL 2 TIMES DAILY
COMMUNITY

## 2022-02-01 RX ORDER — TRAZODONE HYDROCHLORIDE 50 MG/1
50 TABLET ORAL NIGHTLY
COMMUNITY

## 2022-02-01 ASSESSMENT — ENCOUNTER SYMPTOMS
NAUSEA: 0
VOMITING: 0
SHORTNESS OF BREATH: 0
COUGH: 0
CHEST TIGHTNESS: 0
DIARRHEA: 0
WHEEZING: 0
STRIDOR: 0

## 2022-02-10 ENCOUNTER — HOSPITAL ENCOUNTER (EMERGENCY)
Age: 57
Discharge: HOME | End: 2022-02-10
Payer: MEDICARE

## 2022-02-10 ENCOUNTER — APPOINTMENT (OUTPATIENT)
Dept: GENERAL RADIOLOGY | Age: 57
End: 2022-02-10
Payer: MEDICARE

## 2022-02-10 VITALS
SYSTOLIC BLOOD PRESSURE: 161 MMHG | WEIGHT: 315 LBS | OXYGEN SATURATION: 98 % | TEMPERATURE: 98.2 F | HEIGHT: 76 IN | HEART RATE: 88 BPM | DIASTOLIC BLOOD PRESSURE: 89 MMHG | RESPIRATION RATE: 18 BRPM | BODY MASS INDEX: 38.36 KG/M2

## 2022-02-10 DIAGNOSIS — R03.0 ELEVATED BLOOD PRESSURE READING: ICD-10-CM

## 2022-02-10 DIAGNOSIS — J44.1 COPD EXACERBATION (HCC): Primary | ICD-10-CM

## 2022-02-10 LAB
EKG ATRIAL RATE: 88 BPM
EKG P AXIS: 50 DEGREES
EKG P-R INTERVAL: 164 MS
EKG Q-T INTERVAL: 366 MS
EKG QRS DURATION: 116 MS
EKG QTC CALCULATION (BAZETT): 442 MS
EKG R AXIS: -5 DEGREES
EKG T AXIS: 26 DEGREES
EKG VENTRICULAR RATE: 88 BPM
FLU A ANTIGEN: NEGATIVE
FLU B ANTIGEN: NEGATIVE
SARS-COV-2, NAAT: NOT DETECTED

## 2022-02-10 PROCEDURE — 87635 SARS-COV-2 COVID-19 AMP PRB: CPT

## 2022-02-10 PROCEDURE — 93005 ELECTROCARDIOGRAM TRACING: CPT | Performed by: NURSE PRACTITIONER

## 2022-02-10 PROCEDURE — 6370000000 HC RX 637 (ALT 250 FOR IP): Performed by: NURSE PRACTITIONER

## 2022-02-10 PROCEDURE — 94640 AIRWAY INHALATION TREATMENT: CPT

## 2022-02-10 PROCEDURE — 71046 X-RAY EXAM CHEST 2 VIEWS: CPT

## 2022-02-10 PROCEDURE — 99284 EMERGENCY DEPT VISIT MOD MDM: CPT

## 2022-02-10 PROCEDURE — 93010 ELECTROCARDIOGRAM REPORT: CPT | Performed by: NUCLEAR MEDICINE

## 2022-02-10 PROCEDURE — 87804 INFLUENZA ASSAY W/OPTIC: CPT

## 2022-02-10 RX ORDER — ALBUTEROL SULFATE 90 UG/1
2 AEROSOL, METERED RESPIRATORY (INHALATION) 4 TIMES DAILY PRN
Qty: 18 G | Refills: 0 | Status: SHIPPED | OUTPATIENT
Start: 2022-02-10

## 2022-02-10 RX ORDER — IPRATROPIUM BROMIDE AND ALBUTEROL SULFATE 2.5; .5 MG/3ML; MG/3ML
1 SOLUTION RESPIRATORY (INHALATION) ONCE
Status: COMPLETED | OUTPATIENT
Start: 2022-02-10 | End: 2022-02-10

## 2022-02-10 RX ORDER — BENZONATATE 100 MG/1
100 CAPSULE ORAL 3 TIMES DAILY PRN
Qty: 30 CAPSULE | Refills: 0 | Status: SHIPPED | OUTPATIENT
Start: 2022-02-10 | End: 2022-02-17

## 2022-02-10 RX ORDER — PREDNISONE 10 MG/1
TABLET ORAL
Qty: 20 TABLET | Refills: 0 | Status: SHIPPED | OUTPATIENT
Start: 2022-02-10 | End: 2022-02-20

## 2022-02-10 RX ORDER — AZITHROMYCIN 250 MG/1
250 TABLET, FILM COATED ORAL SEE ADMIN INSTRUCTIONS
Qty: 6 TABLET | Refills: 0 | Status: SHIPPED | OUTPATIENT
Start: 2022-02-10 | End: 2022-02-15

## 2022-02-10 RX ADMIN — IPRATROPIUM BROMIDE AND ALBUTEROL SULFATE 1 AMPULE: .5; 3 SOLUTION RESPIRATORY (INHALATION) at 08:39

## 2022-02-10 ASSESSMENT — ENCOUNTER SYMPTOMS
COUGH: 1
VOICE CHANGE: 1
SHORTNESS OF BREATH: 0
SORE THROAT: 0
RHINORRHEA: 1
CHEST TIGHTNESS: 0
COLOR CHANGE: 0
NAUSEA: 0
ABDOMINAL DISTENTION: 0
DIARRHEA: 0
TROUBLE SWALLOWING: 0
ABDOMINAL PAIN: 0
VOMITING: 0

## 2022-02-10 NOTE — ED NOTES
Released with discharge instructions- Alert, skin warm and dry, respirations even and unlabored at this time- no questions or concerns noted- Pt left ambulatory     Mary JESUS Waggoner  02/10/22 4974

## 2022-02-10 NOTE — ED PROVIDER NOTES
Hyperlipidemia     Hypertension     Panic attacks     Pneumonia     Sleep apnea     SOB (shortness of breath)     Spondylosis     sponylolisthesis L5    Swelling        SURGICALHISTORY      has a past surgical history that includes Cardiac catheterization (01/30/2008); cardiovascular stress test (02/16/2009); transthoracic echocardiogram (07/22/2011); and pre-malignant / benign skin lesion excision (5/17/12). CURRENT MEDICATIONS       Previous Medications    ACETAMINOPHEN (APAP EXTRA STRENGTH) 500 MG TABLET    Take 1 tablet by mouth every 6 hours as needed for Pain    ALLOPURINOL (ZYLOPRIM) 300 MG TABLET    Take 1 tablet by mouth daily    ASPIRIN EC 81 MG EC TABLET    Take 81 mg by mouth daily. With food; blood thinner    ATORVASTATIN (LIPITOR) 20 MG TABLET    TAKE 1 TABLET BY MOUTH ONE TIME A DAY    BLOOD PRESSURE KIT    This is a prescription for a blood pressure cuff for at home use. BLOOD PRESSURE MONITORING (BLOOD PRESSURE MONITOR AUTOMAT) GENET    1 applicator by Does not apply route daily    BUMETANIDE (BUMEX) 0.5 MG TABLET    Take 2 tablets daily. If weight gain 3Ibs in 1 day or 5Ibs in 1 week, take 2 tablets in AM and PM as needed. CARBAMAZEPINE (TEGRETOL) 200 MG TABLET    Take 200 mg by mouth 2 times daily     CARVEDILOL (COREG) 25 MG TABLET    Take 1 tablet by mouth 2 times daily    CITALOPRAM (CELEXA) 20 MG TABLET    Take 1 tablet by mouth daily. CPAP MACHINE MISC    Please provide a new CPAP machine with pressure of 10. The new CPAP machine must have capabilities to give down load report regarding >4hour compliance and residual AHI.     PAP must have auto capability and detailed download capability    DICYCLOMINE (BENTYL) 10 MG CAPSULE    Take 1 capsule by mouth every 6 hours as needed (cramps)    FOLIC ACID (FOLATE PO)    Take by mouth 2 times daily 1333mcg tablets    IBUPROFEN (ADVIL;MOTRIN) 800 MG TABLET    TAKE 1 TABLET BY MOUTH EVERY 8 HOURS AS NEEDED    LISINOPRIL (PRINIVIL;ZESTRIL) 10 MG TABLET    TAKE 1 TABLET BY MOUTH ONCE DAILY    MECLIZINE (ANTIVERT) 25 MG TABLET    Take 1 tablet by mouth 3 times daily as needed    METFORMIN (GLUCOPHAGE-XR) 500 MG EXTENDED RELEASE TABLET    Take 500 mg by mouth daily (with breakfast)    OMEPRAZOLE (PRILOSEC) 20 MG DELAYED RELEASE CAPSULE    TAKE 1 CAPSULE BY MOUTH ONCE DAILY    ONDANSETRON (ZOFRAN ODT) 4 MG DISINTEGRATING TABLET    Take 1 tablet by mouth every 8 hours as needed for Nausea    RISPERIDONE (RISPERDAL) 2 MG TABLET    Take 0.5 mg by mouth 2 times daily     SPIRONOLACTONE (ALDACTONE) 25 MG TABLET    Take 0.5 tablets by mouth daily    TRAMADOL (ULTRAM) 50 MG TABLET    Take 50 mg by mouth every 8 hours as needed for Pain. .    TRAZODONE (DESYREL) 50 MG TABLET    Take 50 mg by mouth nightly    TRIAMCINOLONE (KENALOG) 0.1 % CREAM    Apply topically 2 times daily Apply topically 2 times daily. UMECLIDINIUM-VILANTEROL (ANORO ELLIPTA) 62.5-25 MCG/INH AEPB INHALER    Inhale 1 puff into the lungs daily    VITAMIN C (VITAMIN C) 500 MG TABLET    Take 1 tablet by mouth daily       ALLERGIES     is allergic to losartan potassium and mobic [meloxicam]. FAMILY HISTORY     He indicated that his mother is alive. He indicated that his father is . He indicated that his maternal grandmother is . family history includes Cancer in his maternal grandmother; Coronary Art Dis in his father; High Blood Pressure in his father; High Cholesterol in his father.     SOCIAL HISTORY       Social History     Socioeconomic History    Marital status: Single     Spouse name: Not on file    Number of children: 3    Years of education: 15    Highest education level: Not on file   Occupational History    Occupation: unemployed   Tobacco Use    Smoking status: Passive Smoke Exposure - Never Smoker    Smokeless tobacco: Never Used   Vaping Use    Vaping Use: Never used   Substance and Sexual Activity    Alcohol use: No     Alcohol/week: External ear normal.      Nose: Nose normal.      Mouth/Throat:      Pharynx: Uvula midline. Eyes:      Conjunctiva/sclera: Conjunctivae normal.   Cardiovascular:      Rate and Rhythm: Normal rate and regular rhythm. Heart sounds: Normal heart sounds, S1 normal and S2 normal.   Pulmonary:      Effort: Pulmonary effort is normal. No respiratory distress. Breath sounds: Wheezing present. Chest:      Chest wall: No tenderness. Abdominal:      General: Bowel sounds are normal. There is no distension. Palpations: Abdomen is soft. Tenderness: There is no abdominal tenderness. Musculoskeletal:         General: Normal range of motion. Cervical back: Normal range of motion and neck supple. Skin:     General: Skin is warm and dry. Coloration: Skin is not pale. Findings: No erythema or rash. Neurological:      Mental Status: He is alert and oriented to person, place, and time. Psychiatric:         Behavior: Behavior normal.         Thought Content: Thought content normal.         Judgment: Judgment normal.         DIFFERENTIAL DIAGNOSIS:   Pneumonia, COPD exacerbation, COVID-19,  DIAGNOSTIC RESULTS     EKG: All EKG's are interpreted by the Emergency Department Physician who eithersigns or Co-signs this chart in the absence of a cardiologist.    EKG read and interpreted by myself with comparison to November 27, 2020 gives impression of normal sinus rhythm with PVCs. Heart rate of 88; interval 164; ;QTc 442; axis P-50, R--5, T-26. RADIOLOGY: non-plainfilm images(s) such as CT, Ultrasound and MRI are read by the radiologist.  Plain radiographic images are visualized and preliminarily interpreted by the emergency physician unless otherwise stated below. XR CHEST (2 VW)   Final Result   Low lung volumes and cardiomegaly. No acute intrathoracic process. **This report has been created using voice recognition software.  It may contain minor errors which are inherent in voice recognition technology. **      Final report electronically signed by Dr Fredrick Godfrey on 2/10/2022 8:43 AM            LABS:   Labs Reviewed   COVID-19, RAPID   RAPID INFLUENZA A/B ANTIGENS       EMERGENCY DEPARTMENT COURSE:   Vitals:    Vitals:    02/10/22 0734 02/10/22 0851   BP: (!) 157/78 (!) 171/102   Pulse: 99 92   Resp: 16 18   Temp: 98.2 °F (36.8 °C)    TempSrc: Oral    SpO2: 93% 96%   Weight: (!) 356 lb (161.5 kg)    Height: 6' 4\" (1.93 m)          MDM  Patient was seen and evaluated in the emergency department, patient appeared to be in no acute distress, vital signs reviewed, no significant findings noted. Physical exam completed wheezes noted to bilateral lungs. Labs and imaging were ordered. No significant findings noted. Patient was treated with DuoNeb, notes some improvement in symptoms. Lungs are more clear. Discussed my findings and plan of care with the patient is minimal discharge. Will place him on prednisone and albuterol, as well as Z-Carrillo and Tessalon Perles for cough. He is advised to follow-up with his family doctor, noted elevated blood pressure. He verbalized understanding of plan of care. Medications   ipratropium-albuterol (DUONEB) nebulizer solution 1 ampule (1 ampule Inhalation Given 2/10/22 0839)       Patient was seenindependently by myself. The patient's final impression and disposition and plan was determined by myself.      CRITICAL CARE:   None    CONSULTS:  None    PROCEDURES:  None    FINAL IMPRESSION     1. COPD exacerbation (HCC)    2. Elevated blood pressure reading          DISPOSITION/PLAN   Patient discharged    PATIENT REFERREDTO:  Lenny Sanchez MD  [address]  [phone]    Call   For follow up and evaluation      DISCHARGE MEDICATIONS:  New Prescriptions    ALBUTEROL SULFATE HFA (VENTOLIN HFA) 108 (90 BASE) MCG/ACT INHALER    Inhale 2 puffs into the lungs 4 times daily as needed for Wheezing    AZITHROMYCIN (ZITHROMAX) 250 MG TABLET    Take 1 tablet by mouth See Admin Instructions for 5 days 500mg on day 1 followed by 250mg on days 2 - 5    BENZONATATE (TESSALON PERLES) 100 MG CAPSULE    Take 1 capsule by mouth 3 times daily as needed for Cough    PREDNISONE (DELTASONE) 10 MG TABLET    Take 4 tablets by mouth once daily for 5 days       (Please note that portions of this note were completed with a voice recognition program.  Efforts were made to edit the dictations but occasionally words are mis-transcribed.)    Provider:  I personally performed the services described in the documentation,reviewed and edited the documentation which was dictated to the scribe in my presence, and it accurately records my words and actions.     Oscar Jha CNP 02/10/22 9:28 AM    Be Jha, APRN - CNP        Karma, MARCO - CNP  02/10/22 0429

## 2022-02-10 NOTE — ED NOTES
Pt presents to the ED with a cough that has been ongoing for a week and a half. Pt states he is coughing up phlegm and the cough gets worse at night. No other symptoms stated at this time.       Rufus Salcedo  02/10/22 1052

## 2022-02-10 NOTE — ED NOTES
Resting quietly at this time- Assessment unchanged except states breathing easier since treatment     Hollis Maya RN  02/10/22 9391

## 2022-04-08 ENCOUNTER — OFFICE VISIT (OUTPATIENT)
Dept: CARDIOLOGY CLINIC | Age: 57
End: 2022-04-08
Payer: MEDICARE

## 2022-04-08 ENCOUNTER — TELEPHONE (OUTPATIENT)
Dept: CARDIOLOGY CLINIC | Age: 57
End: 2022-04-08

## 2022-04-08 VITALS
HEART RATE: 68 BPM | BODY MASS INDEX: 38.36 KG/M2 | WEIGHT: 315 LBS | HEIGHT: 76 IN | SYSTOLIC BLOOD PRESSURE: 128 MMHG | DIASTOLIC BLOOD PRESSURE: 68 MMHG

## 2022-04-08 DIAGNOSIS — I42.0 DILATED CARDIOMYOPATHY (HCC): Primary | ICD-10-CM

## 2022-04-08 DIAGNOSIS — R00.2 PALPITATIONS: ICD-10-CM

## 2022-04-08 DIAGNOSIS — I10 PRIMARY HYPERTENSION: ICD-10-CM

## 2022-04-08 DIAGNOSIS — I49.3 PVC (PREMATURE VENTRICULAR CONTRACTION): ICD-10-CM

## 2022-04-08 DIAGNOSIS — E78.01 FAMILIAL HYPERCHOLESTEROLEMIA: ICD-10-CM

## 2022-04-08 PROCEDURE — 99214 OFFICE O/P EST MOD 30 MIN: CPT | Performed by: NUCLEAR MEDICINE

## 2022-04-08 NOTE — TELEPHONE ENCOUNTER
Tried calling pt to schedule echo and holter  No answer, no voice mail      Pt returned call  Echo and holter scheduled 05-03-22   Pt informed, date and time reviewed

## 2022-05-03 ENCOUNTER — HOSPITAL ENCOUNTER (OUTPATIENT)
Dept: NON INVASIVE DIAGNOSTICS | Age: 57
Discharge: HOME | End: 2022-05-03
Payer: MEDICARE

## 2022-05-03 DIAGNOSIS — R00.2 PALPITATIONS: ICD-10-CM

## 2022-05-03 DIAGNOSIS — I49.3 PVC (PREMATURE VENTRICULAR CONTRACTION): ICD-10-CM

## 2022-05-03 DIAGNOSIS — E78.01 FAMILIAL HYPERCHOLESTEROLEMIA: ICD-10-CM

## 2022-05-03 DIAGNOSIS — I42.0 DILATED CARDIOMYOPATHY (HCC): ICD-10-CM

## 2022-05-03 DIAGNOSIS — I10 PRIMARY HYPERTENSION: ICD-10-CM

## 2022-05-03 LAB
LV EF: 50 %
LVEF MODALITY: NORMAL

## 2022-05-03 PROCEDURE — 93306 TTE W/DOPPLER COMPLETE: CPT

## 2022-05-03 PROCEDURE — 93225 XTRNL ECG REC<48 HRS REC: CPT

## 2022-05-03 PROCEDURE — 93226 XTRNL ECG REC<48 HR SCAN A/R: CPT

## 2022-05-08 LAB
ACQUISITION DURATION: NORMAL S
AVERAGE HEART RATE: 86 BPM
HOOKUP DATE: NORMAL
HOOKUP TIME: NORMAL
MAX HEART RATE TIME/DATE: NORMAL
MAX HEART RATE: 115 BPM
MIN HEART RATE TIME/DATE: NORMAL
MIN HEART RATE: 65 BPM
NUMBER OF QRS COMPLEXES: NORMAL
NUMBER OF SUPRAVENTRICULAR COUPLETS: 0
NUMBER OF SUPRAVENTRICULAR ECTOPICS: 9
NUMBER OF SUPRAVENTRICULAR ISOLATED BEATS: 9
NUMBER OF VENTRICULAR BIGEMINAL CYCLES: 562
NUMBER OF VENTRICULAR COUPLETS: 162
NUMBER OF VENTRICULAR ECTOPICS: NORMAL

## 2022-05-10 ENCOUNTER — TELEPHONE (OUTPATIENT)
Dept: PULMONOLOGY | Age: 57
End: 2022-05-10

## 2022-05-10 NOTE — TELEPHONE ENCOUNTER
Download reviewed contact DME to see if download data available is able to be verified as correct if correct follow-up with patient that he has received his new machine at last appointment pt. Reported error message that motor had exceeded its approved life.  Once these questions have been answered can determine if new pressure is needed

## 2022-05-11 ENCOUNTER — TELEPHONE (OUTPATIENT)
Dept: CARDIOLOGY CLINIC | Age: 57
End: 2022-05-11

## 2022-05-12 NOTE — TELEPHONE ENCOUNTER
Data from available information reads inconsistently. Contact patient to see if he is having any issues using new machine inform him there is currently a software problem that the  is working on fixing that affects the information the machine records. If he has any issues with performance of the machine he needs to contact the DME.  If he is having no problem s have DME send download after software is patched

## 2022-08-17 ENCOUNTER — OFFICE VISIT (OUTPATIENT)
Dept: CARDIOLOGY CLINIC | Age: 57
End: 2022-08-17
Payer: MEDICARE

## 2022-08-17 ENCOUNTER — NURSE ONLY (OUTPATIENT)
Dept: LAB | Age: 57
End: 2022-08-17

## 2022-08-17 ENCOUNTER — HOSPITAL ENCOUNTER (OUTPATIENT)
Dept: NON INVASIVE DIAGNOSTICS | Age: 57
Discharge: HOME | End: 2022-08-17
Payer: MEDICARE

## 2022-08-17 VITALS
HEART RATE: 60 BPM | SYSTOLIC BLOOD PRESSURE: 118 MMHG | DIASTOLIC BLOOD PRESSURE: 80 MMHG | WEIGHT: 315 LBS | HEIGHT: 76 IN | BODY MASS INDEX: 38.36 KG/M2 | OXYGEN SATURATION: 94 %

## 2022-08-17 DIAGNOSIS — I50.32 CHRONIC DIASTOLIC CONGESTIVE HEART FAILURE, NYHA CLASS 2 (HCC): Primary | ICD-10-CM

## 2022-08-17 DIAGNOSIS — I50.32 CHRONIC DIASTOLIC CONGESTIVE HEART FAILURE, NYHA CLASS 2 (HCC): ICD-10-CM

## 2022-08-17 DIAGNOSIS — R00.2 PALPITATIONS: ICD-10-CM

## 2022-08-17 LAB
ANION GAP SERPL CALCULATED.3IONS-SCNC: 10 MEQ/L (ref 8–16)
BUN BLDV-MCNC: 15 MG/DL (ref 7–22)
CALCIUM SERPL-MCNC: 9.4 MG/DL (ref 8.5–10.5)
CHLORIDE BLD-SCNC: 103 MEQ/L (ref 98–111)
CO2: 30 MEQ/L (ref 23–33)
CREAT SERPL-MCNC: 1 MG/DL (ref 0.4–1.2)
GFR SERPL CREATININE-BSD FRML MDRD: > 90 ML/MIN/1.73M2
GLUCOSE BLD-MCNC: 88 MG/DL (ref 70–108)
MAGNESIUM: 2.1 MG/DL (ref 1.6–2.4)
POTASSIUM SERPL-SCNC: 4.4 MEQ/L (ref 3.5–5.2)
PRO-BNP: 113.3 PG/ML (ref 0–900)
SODIUM BLD-SCNC: 143 MEQ/L (ref 135–145)

## 2022-08-17 PROCEDURE — 93226 XTRNL ECG REC<48 HR SCAN A/R: CPT

## 2022-08-17 PROCEDURE — 93000 ELECTROCARDIOGRAM COMPLETE: CPT | Performed by: NURSE PRACTITIONER

## 2022-08-17 PROCEDURE — 93225 XTRNL ECG REC<48 HRS REC: CPT

## 2022-08-17 PROCEDURE — 99213 OFFICE O/P EST LOW 20 MIN: CPT | Performed by: NURSE PRACTITIONER

## 2022-08-17 ASSESSMENT — ENCOUNTER SYMPTOMS
SHORTNESS OF BREATH: 1
VOMITING: 0
ABDOMINAL DISTENTION: 0
NAUSEA: 0
COUGH: 0

## 2022-08-17 NOTE — PATIENT INSTRUCTIONS
You may receive a survey regarding the care you received during your visit. Your input is valuable to us. We encourage you to complete and return your survey. We hope you will choose us in the future for your healthcare needs. Continue:  Continue current medications  Daily weights and record  Fluid restriction of 2 Liters per day  Limit sodium in diet to around 1521-0157 mg/day  Monitor BP  Activity as tolerated     Call the [facility] for any of the following symptoms: [phone]  Weight gain of 2-3 pounds in 1 day or 5 pounds in 1 week  Increased shortness of breath  Shortness of breath while laying down  Cough  Chest pain  Swelling in feet, ankles or legs  Tenderness or bloating in the abdomen  Fatigue   Decreased appetite or nausea   Confusion        Adding Jardiance 10mg Daily  Blood work in 1 month  Continue diet/fluid adherence  Continue daily wts.   F/U w/ Cardiology  F/U in clinic in 1 year

## 2022-08-17 NOTE — PROGRESS NOTES
[facility]       Visit Date: 8/17/2022  Cardiologist:  Dr. Naga Gonzalez  Primary Care Physician: Dr. Rahul Amezcua MD    Nellie Leslie is a 62 y.o. male who presents today for:  Chief Complaint   Patient presents with    Congestive Heart Failure       HPI:   Nellie Leslie is a 62 y.o. male who presents to the office for a follow up patient visit in the heart failure clinic.    Accompanied by self    TYPE HF: HFpEF (55% 2019) (45%, 2017)  Cause: recovered nonischemic  Device: none  HX: HLD, HTN, CAD, KRISTEN on CPAP, COPD, heavy alcohol drinker    Dry Wt:  350 (354 on 10/7/21) (350 on 8/17/22)    Hospitalization:  > 6 months    Concerns today: no concerns today,       Visit on 10/7/21: has not needed to take an extra Bumex for a month, notes his chronic fatigue     Activity: ADLs performed, LAM w/ no breaks needed  Diet: uses no-salt, needs improvement on Na restrictions, stays under 64 oz    Patient has:  Chest Pain: no  SOB: chronic LAM  Orthopnea/PND: no  KRISTEN: yes, CPAP  Edema: no  Fatigue: chronic  Abdominal bloating: no  Cough: no  Appetite: good  Home weight: no scale  Home blood pressure: no cuff    Past Medical History:   Diagnosis Date    Anxiety     Arrhythmia     Arthritis     CAD (coronary artery disease)     Chest pain     HX OF:    CHF (congestive heart failure) (HCC)     Chronic back pain     COPD (chronic obstructive pulmonary disease) (HCC)     Depression     Diverticula of colon     Fatigue     Heart attack (HCC)     Hyperlipidemia     Hypertension     Panic attacks     Pneumonia     Sleep apnea     SOB (shortness of breath)     Spondylosis     sponylolisthesis L5    Swelling      Past Surgical History:   Procedure Laterality Date    CARDIAC CATHETERIZATION  01/30/2008    EF 20%    CARDIOVASCULAR STRESS TEST  02/16/2009    EF 52%    COLONOSCOPY      PRE-MALIGNANT / BENIGN SKIN LESION EXCISION  5/17/12    mole on abd-left arm-Dr. Floresita Godoy    TRANSTHORACIC ECHOCARDIOGRAM  07/22/2011    EF 55-65% Family History   Problem Relation Age of Onset    High Cholesterol Father     High Blood Pressure Father     Coronary Art Dis Father     Colon Cancer Father 68    Cancer Maternal Grandmother         colon    Colon Cancer Maternal Grandmother     Colon Cancer Sister 52    Colon Cancer Paternal Uncle     Colon Cancer Maternal Grandfather     Colon Cancer Paternal Grandfather      Social History     Tobacco Use    Smoking status: Passive Smoke Exposure - Never Smoker    Smokeless tobacco: Never   Substance Use Topics    Alcohol use: Yes     Alcohol/week: 0.0 standard drinks     Comment: beer      Current Outpatient Medications   Medication Sig Dispense Refill    empagliflozin (JARDIANCE) 10 MG tablet Take 1 tablet by mouth daily 90 tablet 3    bisacodyl (DULCOLAX) 5 MG EC tablet See Prep Instructions 4 tablet 0    polyethylene glycol (GLYCOLAX) 17 GM/SCOOP powder Dispense 238 Gram Bottle. Use as Directed 238 g 0    albuterol sulfate HFA (VENTOLIN HFA) 108 (90 Base) MCG/ACT inhaler Inhale 2 puffs into the lungs 4 times daily as needed for Wheezing 18 g 0    traZODone (DESYREL) 50 MG tablet Take 50 mg by mouth nightly      umeclidinium-vilanterol (ANORO ELLIPTA) 62.5-25 MCG/INH AEPB inhaler Inhale 1 puff into the lungs daily      triamcinolone (KENALOG) 0.1 % cream Apply topically 2 times daily Apply topically 2 times daily.       ibuprofen (ADVIL;MOTRIN) 800 MG tablet TAKE 1 TABLET BY MOUTH EVERY 8 HOURS AS NEEDED      lisinopril (PRINIVIL;ZESTRIL) 10 MG tablet TAKE 1 TABLET BY MOUTH ONCE DAILY      vitamin C (VITAMIN C) 500 MG tablet Take 1 tablet by mouth daily 30 tablet 3    Folic Acid (FOLATE PO) Take by mouth 2 times daily 1333mcg tablets      dicyclomine (BENTYL) 10 MG capsule Take 1 capsule by mouth every 6 hours as needed (cramps) 20 capsule 0    ondansetron (ZOFRAN ODT) 4 MG disintegrating tablet Take 1 tablet by mouth every 8 hours as needed for Nausea 20 tablet 0    acetaminophen (APAP EXTRA STRENGTH) 500 MG tablet Take 1 tablet by mouth every 6 hours as needed for Pain 120 tablet 0    traMADol (ULTRAM) 50 MG tablet Take 50 mg by mouth every 8 hours as needed for Pain. .      metFORMIN (GLUCOPHAGE-XR) 500 MG extended release tablet Take 500 mg by mouth daily (with breakfast)      allopurinol (ZYLOPRIM) 300 MG tablet Take 1 tablet by mouth daily 30 tablet 5    atorvastatin (LIPITOR) 20 MG tablet TAKE 1 TABLET BY MOUTH ONE TIME A DAY 30 tablet 5    bumetanide (BUMEX) 0.5 MG tablet Take 2 tablets daily. If weight gain 3Ibs in 1 day or 5Ibs in 1 week, take 2 tablets in AM and PM as needed. 60 tablet 5    carvedilol (COREG) 25 MG tablet Take 1 tablet by mouth 2 times daily 60 tablet 5    spironolactone (ALDACTONE) 25 MG tablet Take 0.5 tablets by mouth daily 15 tablet 5    meclizine (ANTIVERT) 25 MG tablet Take 1 tablet by mouth 3 times daily as needed 60 tablet 5    carBAMazepine (TEGRETOL) 200 MG tablet Take 200 mg by mouth 2 times daily       risperiDONE (RISPERDAL) 2 MG tablet Take 0.5 mg by mouth 2 times daily       citalopram (CELEXA) 20 MG tablet Take 1 tablet by mouth daily. 30 tablet 5    aspirin EC 81 MG EC tablet Take 81 mg by mouth daily. With food; blood thinner      CPAP Machine MISC Please provide a new CPAP machine with pressure of 10. The new CPAP machine must have capabilities to give down load report regarding >4hour compliance and residual AHI. PAP must have auto capability and detailed download capability 1 each 0    Blood Pressure Monitoring (BLOOD PRESSURE MONITOR AUTOMAT) GENET 1 applicator by Does not apply route daily 1 each 0    Blood Pressure KIT This is a prescription for a blood pressure cuff for at home use. 1 kit 0     No current facility-administered medications for this visit. Allergies   Allergen Reactions    Losartan Potassium     Mobic [Meloxicam]        SUBJECTIVE:   Review of Systems   Constitutional:  Negative for activity change, appetite change, diaphoresis and fatigue. Respiratory:  Positive for shortness of breath (chronic LAM). Negative for cough. Cardiovascular:  Positive for palpitations. Negative for chest pain and leg swelling. Gastrointestinal:  Negative for abdominal distention, nausea and vomiting. Neurological:  Negative for weakness, light-headedness and headaches. Hematological:  Negative for adenopathy. Psychiatric/Behavioral:  Negative for sleep disturbance. OBJECTIVE:   Today's Vitals:  /80   Pulse 60   Ht 6' 4\" (1.93 m)   Wt (!) 350 lb (158.8 kg)   SpO2 94%   BMI 42.60 kg/m²     Physical Exam  Vitals reviewed. Constitutional:       General: He is not in acute distress. Appearance: Normal appearance. He is well-developed. He is not diaphoretic. HENT:      Head: Normocephalic and atraumatic. Eyes:      Conjunctiva/sclera: Conjunctivae normal.   Cardiovascular:      Rate and Rhythm: Normal rate. Rhythm irregular. Heart sounds: Normal heart sounds. No murmur heard. Pulmonary:      Effort: Pulmonary effort is normal. No respiratory distress. Breath sounds: Normal breath sounds. No wheezing or rales. Abdominal:      General: Bowel sounds are normal. There is no distension. Palpations: Abdomen is soft. Tenderness: There is no abdominal tenderness. Musculoskeletal:         General: Normal range of motion. Cervical back: Normal range of motion and neck supple. Right lower leg: No edema. Left lower leg: No edema. Skin:     General: Skin is warm and dry. Capillary Refill: Capillary refill takes less than 2 seconds. Neurological:      Mental Status: He is alert and oriented to person, place, and time.       Coordination: Coordination normal.   Psychiatric:         Behavior: Behavior normal.       Wt Readings from Last 3 Encounters:   08/17/22 (!) 350 lb (158.8 kg)   06/21/22 (!) 351 lb (159.2 kg)   04/08/22 (!) 354 lb (160.6 kg)     BP Readings from Last 3 Encounters:   08/17/22 118/80 06/21/22 107/65   04/08/22 128/68     Pulse Readings from Last 3 Encounters:   08/17/22 60   06/21/22 50   04/08/22 68     Body mass index is 42.6 kg/m². ECHO:    Conclusions      Summary   Ejection fraction is visually estimated at 55%. Overall left ventricular function is normal.      Signature      ----------------------------------------------------------------   Electronically signed by Marcin Merritt MD (Interpreting   physician) on 02/08/2019 at 04:44 PM   ----------------------------------------------------------------  Conclusions      Summary   limited echo   Technically difficult examination. Ejection fraction is visually estimated at 45%. There was mild global hypokinesis of the left ventricle. Signature      ----------------------------------------------------------------   Electronically signed by Marcin Merritt MD (Interpreting   physician) on 02/01/2017 at 07:41 AM   ----------------------------------------------------------------      CATH/STRESS:   Conclusions      Summary   This Nuclear Medicine study was negative for ischemia. abnormal EF of 42 %      Recommendation   consider an echo to assess LV function   Clinical correlation is recommended.       Signatures      ----------------------------------------------------------------   Electronically signed by Marcin Merritt MD (Interpreting   Cardiologist) on 04/06/2016 at 07:46   ----------------------------------------------------------------        Results reviewed:  BNP:   Lab Results   Component Value Date    BNP 11.7 02/16/2014     CBC:   Lab Results   Component Value Date/Time    WBC 5.8 01/28/2022 08:14 AM    RBC 5.14 01/28/2022 08:14 AM    RBC 4.76 09/14/2011 08:17 AM    HGB 14.5 01/28/2022 08:14 AM    HCT 46.4 01/28/2022 08:14 AM     01/28/2022 08:14 AM     CMP:    Lab Results   Component Value Date/Time     01/28/2022 08:14 AM    K 4.8 01/28/2022 08:14 AM    K 4.8 11/28/2020 04:24 AM     01/28/2022 08:14 AM    CO2 27 01/28/2022 08:14 AM    BUN 18 01/28/2022 08:14 AM    CREATININE 1.2 01/28/2022 08:14 AM    LABGLOM 76 01/28/2022 08:13 AM    GLUCOSE 110 01/28/2022 08:14 AM    GLUCOSE 107 05/16/2012 06:20 AM    CALCIUM 9.6 01/28/2022 08:14 AM     Hepatic Function Panel:    Lab Results   Component Value Date/Time    ALKPHOS 102 01/28/2022 08:14 AM    ALT 41 01/28/2022 08:14 AM    AST 28 01/28/2022 08:14 AM    PROT 7.7 01/28/2022 08:14 AM    BILITOT 0.5 01/28/2022 08:14 AM    BILIDIR <0.2 11/28/2020 04:24 AM    LABALBU 4.7 01/28/2022 08:14 AM    LABALBU 4.4 09/14/2011 08:17 AM     Magnesium:    Lab Results   Component Value Date/Time    MG 1.9 12/01/2019 12:00 PM     PT/INR:    Lab Results   Component Value Date/Time    INR 1.02 11/28/2020 04:24 AM     Lipids:    Lab Results   Component Value Date/Time    TRIG 119 01/28/2022 08:14 AM    HDL 40 01/28/2022 08:14 AM    LDLCALC 116 01/28/2022 08:14 AM       ASSESSMENT AND PLAN:   The patient's condition/symptoms are Stable: No clinical evidence of fluid overload today. Continue current medical regimen without changes at present time. Diagnosis Orders   1. Chronic diastolic congestive heart failure, NYHA class 2 ([facility])  Basic Metabolic Panel    Magnesium    Brain Natriuretic Peptide      2. Palpitations  EKG 12 lead        Continue:  GDMT:   ACE/ARB/ARNI - Lisinopril 10mg daily   BB - Coreg 25 BID   Diuretic - Bumex 0.5 daily  AA - Aldactone 12.5 day  SGLT2 -  none  Vasodilator - none  Other - ASA      HFpEF 50% w/ some mild dilation of RV (2022)  Stable, appears Euvolemic on exam. Urinating well on his diuretic, no hospitalizations or phone calls to the office. Drinks average of 3 beers a day. Noting new dilation of right ventricle. Pt is compliant with his CPAP and does not smoke. LAM at baseline.      ECHO 2022: no valvular disease, mild dilation of RV  Stress 2016: negative for ischemia  Lab reviewed - need new    EKG today d/t palpitations: hx of PVCs. Pt in ventricular bigeminy on todays EKG: labs today and 24hr monitor       Adding Jardiance 10mg Daily  Blood work in 1 month  Continue diet/fluid adherence  Continue daily wts. F/U w/ Cardiology  F/U in clinic in 1 year    Tolerating above noted HF meds, no ill side effects noted. Will continue to monitor kidney function and electrolytes. Will optimize as tolerated. Pt is compliant w/ medications. Total visit time of 20 minutes has been spent with patient on education of symptoms, management, medication, and plan of care; as well as review of chart: labs, ECHO, radiology reports, etc.   I personally spent more then 50% of the appt time face to face with the patient. Daily weights  Fluid restriction of 2 Liters per day  Limit sodium in diet to around 9199-9655 mg/day  Monitor BP  Activity as tolerated       Patient was instructed to call the [facility] for any changes in symptoms as noted in AVS.      Return in about 1 year (around 8/17/2023). or sooner if needed     Patient given educational materials - see patient instructions. We discussed the importance of weighing oneself and recording daily. We also discussed the importance of a low sodium diet, higher sodium foods to avoid and better low sodium food options. Patient verbalizes understanding of plan of care using teach back method, and is agreeable to the treatment plan.        Electronically signed by MARCO Juarez CNP on 8/17/2022 at 9:34 AM

## 2022-08-18 DIAGNOSIS — I50.32 CHRONIC DIASTOLIC CONGESTIVE HEART FAILURE, NYHA CLASS 2 (HCC): Primary | ICD-10-CM

## 2022-08-22 ENCOUNTER — TELEPHONE (OUTPATIENT)
Dept: CARDIOLOGY CLINIC | Age: 57
End: 2022-08-22

## 2022-08-22 LAB
ACQUISITION DURATION: NORMAL S
AVERAGE HEART RATE: 85 BPM
HOOKUP DATE: NORMAL
HOOKUP TIME: NORMAL
MAX HEART RATE TIME/DATE: NORMAL
MAX HEART RATE: 112 BPM
MIN HEART RATE TIME/DATE: NORMAL
MIN HEART RATE: 58 BPM
NUMBER OF QRS COMPLEXES: NORMAL
NUMBER OF SUPRAVENTRICULAR COUPLETS: 0
NUMBER OF SUPRAVENTRICULAR ECTOPICS: 9
NUMBER OF SUPRAVENTRICULAR ISOLATED BEATS: 3
NUMBER OF VENTRICULAR BIGEMINAL CYCLES: 460
NUMBER OF VENTRICULAR COUPLETS: 110
NUMBER OF VENTRICULAR ECTOPICS: 7151

## 2022-08-22 NOTE — TELEPHONE ENCOUNTER
Narrative & Impression    HOLTER MONITOR:  Hours  24     INDICATION FOR STUDY:  Palpitations     CONCLUSION:   Normal sinus rhythm  frequent PVCs total 6136  Abnormal holter        Confirmed by Melani Borges ([id number]) on 8/22/2022 7:32:56 AM

## 2022-09-19 ENCOUNTER — NURSE ONLY (OUTPATIENT)
Dept: LAB | Age: 57
End: 2022-09-19

## 2022-09-19 DIAGNOSIS — I50.32 CHRONIC DIASTOLIC CONGESTIVE HEART FAILURE, NYHA CLASS 2 (HCC): ICD-10-CM

## 2022-09-19 LAB
ANION GAP SERPL CALCULATED.3IONS-SCNC: 11 MEQ/L (ref 8–16)
BUN BLDV-MCNC: 32 MG/DL (ref 7–22)
CALCIUM SERPL-MCNC: 9.4 MG/DL (ref 8.5–10.5)
CHLORIDE BLD-SCNC: 101 MEQ/L (ref 98–111)
CO2: 29 MEQ/L (ref 23–33)
CREAT SERPL-MCNC: 1.7 MG/DL (ref 0.4–1.2)
GLUCOSE BLD-MCNC: 94 MG/DL (ref 70–108)
POTASSIUM SERPL-SCNC: 4.9 MEQ/L (ref 3.5–5.2)
SODIUM BLD-SCNC: 141 MEQ/L (ref 135–145)

## 2022-09-20 ENCOUNTER — TELEPHONE (OUTPATIENT)
Dept: CARDIOLOGY CLINIC | Age: 57
End: 2022-09-20

## 2022-09-20 DIAGNOSIS — I50.32 CHRONIC DIASTOLIC CONGESTIVE HEART FAILURE, NYHA CLASS 2 (HCC): Primary | ICD-10-CM

## 2022-09-20 NOTE — TELEPHONE ENCOUNTER
----- Message from MARCO Lozano CNP sent at 9/20/2022  5:57 AM EDT -----  Jump in Cr from 1.0 to 1.7. I did start jardiance last visit. To stop that. Hold bumex and aldactone for 3 days. BMP on next Monday. Drink at least 1500cc of fluid.

## 2022-09-21 NOTE — TELEPHONE ENCOUNTER
Patient called in and states he had been taking Bumex 0.5 mg 2 tablets in the AM and PM, he increased on his own because of extra fluid. I advised patient to continue to follow Mercedes's recommendations. He will get labs on Monday.

## 2022-09-26 ENCOUNTER — OFFICE VISIT (OUTPATIENT)
Dept: PHYSICAL MEDICINE AND REHAB | Age: 57
End: 2022-09-26
Payer: MEDICARE

## 2022-09-26 ENCOUNTER — NURSE ONLY (OUTPATIENT)
Dept: LAB | Age: 57
End: 2022-09-26

## 2022-09-26 ENCOUNTER — TELEPHONE (OUTPATIENT)
Dept: PHYSICAL MEDICINE AND REHAB | Age: 57
End: 2022-09-26

## 2022-09-26 VITALS
DIASTOLIC BLOOD PRESSURE: 82 MMHG | SYSTOLIC BLOOD PRESSURE: 120 MMHG | BODY MASS INDEX: 38.36 KG/M2 | HEIGHT: 76 IN | WEIGHT: 315 LBS

## 2022-09-26 DIAGNOSIS — G89.4 CHRONIC PAIN SYNDROME: ICD-10-CM

## 2022-09-26 DIAGNOSIS — M54.9 OTHER CHRONIC BACK PAIN: ICD-10-CM

## 2022-09-26 DIAGNOSIS — M46.1 SI (SACROILIAC) JOINT INFLAMMATION (HCC): ICD-10-CM

## 2022-09-26 DIAGNOSIS — G89.29 OTHER CHRONIC BACK PAIN: ICD-10-CM

## 2022-09-26 DIAGNOSIS — M19.90 INFLAMMATORY ARTHRITIS: ICD-10-CM

## 2022-09-26 DIAGNOSIS — M47.816 SPONDYLOSIS OF LUMBAR REGION WITHOUT MYELOPATHY OR RADICULOPATHY: Primary | ICD-10-CM

## 2022-09-26 DIAGNOSIS — M48.10 DISH (DIFFUSE IDIOPATHIC SKELETAL HYPEROSTOSIS): ICD-10-CM

## 2022-09-26 DIAGNOSIS — M48.061 SPINAL STENOSIS OF LUMBAR REGION WITHOUT NEUROGENIC CLAUDICATION: ICD-10-CM

## 2022-09-26 DIAGNOSIS — M10.071 ACUTE IDIOPATHIC GOUT OF RIGHT FOOT: ICD-10-CM

## 2022-09-26 DIAGNOSIS — I50.32 CHRONIC DIASTOLIC CONGESTIVE HEART FAILURE, NYHA CLASS 2 (HCC): ICD-10-CM

## 2022-09-26 LAB
ANION GAP SERPL CALCULATED.3IONS-SCNC: 7 MEQ/L (ref 8–16)
BUN BLDV-MCNC: 17 MG/DL (ref 7–22)
C-REACTIVE PROTEIN: 1.42 MG/DL (ref 0–1)
CALCIUM SERPL-MCNC: 9.4 MG/DL (ref 8.5–10.5)
CHLORIDE BLD-SCNC: 103 MEQ/L (ref 98–111)
CO2: 30 MEQ/L (ref 23–33)
CREAT SERPL-MCNC: 1.1 MG/DL (ref 0.4–1.2)
GFR SERPL CREATININE-BSD FRML MDRD: 50 ML/MIN/1.73M2
GFR SERPL CREATININE-BSD FRML MDRD: 83 ML/MIN/1.73M2
GLUCOSE BLD-MCNC: 105 MG/DL (ref 70–108)
POTASSIUM SERPL-SCNC: 4.8 MEQ/L (ref 3.5–5.2)
RHEUMATOID FACTOR: < 10 IU/ML (ref 0–13)
SEDIMENTATION RATE, ERYTHROCYTE: 15 MM/HR (ref 0–10)
SODIUM BLD-SCNC: 140 MEQ/L (ref 135–145)

## 2022-09-26 PROCEDURE — 99215 OFFICE O/P EST HI 40 MIN: CPT | Performed by: NURSE PRACTITIONER

## 2022-09-26 RX ORDER — TRAMADOL HYDROCHLORIDE 50 MG/1
50 TABLET ORAL EVERY 8 HOURS PRN
Qty: 42 TABLET | Refills: 0 | Status: SHIPPED | OUTPATIENT
Start: 2022-09-26 | End: 2022-10-05 | Stop reason: SDUPTHER

## 2022-09-26 ASSESSMENT — ENCOUNTER SYMPTOMS
DIARRHEA: 0
EYE PAIN: 0
CHEST TIGHTNESS: 0
CONSTIPATION: 0
SORE THROAT: 0
SINUS PRESSURE: 0
RHINORRHEA: 0
ABDOMINAL PAIN: 0
COLOR CHANGE: 0
PHOTOPHOBIA: 0
BACK PAIN: 1
SHORTNESS OF BREATH: 0
VOMITING: 0
WHEEZING: 0
COUGH: 0
NAUSEA: 0

## 2022-09-26 NOTE — TELEPHONE ENCOUNTER
Notified patient   He questioned if he should restart jardiance   He would like to try [place] instead of Jardiance if restarting

## 2022-09-26 NOTE — TELEPHONE ENCOUNTER
Judy Reinbeck is ordered (may not be covered as it is not on formulary yet for HFpEF). BMP in 4 weeks.

## 2022-09-26 NOTE — PROGRESS NOTES
HPI:     ChiefComplaint: Low back pain    HPI  New pt here with c/o low back pain. He has had some falls in the past  denies nay other injures that could have  inured his back SI or hips. Michael Shoulder on ice  Feb 2022 but has  had low back pain for many years. He has worked mostly [address] work. He is on disability for mental health,  heart issues and chronic pain. He has went [facility] and [facility] [facility] in past  2013- 2017  He states they tried 1 injection in  the past and the needle bent   He c/o low back pain  mostly all axial facet mediated, no radicular s/s into BLE. He has back stiffness with spasms. He has limited ROM  with bending turning  increases the pain. He has LLE weakness at times with long term standing walking. He has had hip  and knee issues in the past also and followed [facility]. He had bursa hip injections  and knee steroid injections at [facility] with  mild to moderate relief. Patient pain increases with bending, lifting, twisting , turning torso, walking, standing, stairs, getting up and down, and housework or working at job. Treatments tried PT/HEP, ICE/HEAT, Chiropractor, NSAIDS, narcotics, muscle relaxer, and OTC rubs creams patches Lyrica 2012  Pain description sharp and aching  Pain rating  scale 1-10 highest  7  lowest  2  average   4  Alleviating Factors: rest Tramadol heat pad    Any leg weakness, saddle paresthesia, bowel or bladder incontinence yes or no? LLE weakness with long term standing. walking     Any prior spine or ortho surgeon consult and with whom Yes Dr Nicol Campuzano many years ago , he recommended back surgery pt declined       Radiology:        The patient is allergic to losartan potassium and mobic [meloxicam]. Subjective:      Review of Systems   Constitutional:  Positive for activity change. Negative for appetite change, chills, diaphoresis, fatigue, fever and unexpected weight change.    HENT:  Negative for congestion, ear pain, hearing loss, mouth sores, nosebleeds, rhinorrhea, sinus pressure and sore throat. Eyes:  Negative for photophobia, pain and visual disturbance. Respiratory:  Negative for cough, chest tightness, shortness of breath and wheezing. KRISTEN   Cardiovascular:  Negative for chest pain and palpitations. HTN CHF MI 2008   Gastrointestinal:  Negative for abdominal pain, constipation, diarrhea, nausea and vomiting. Endocrine: Negative for cold intolerance, heat intolerance, polydipsia, polyphagia and polyuria. DM   Genitourinary:  Negative for decreased urine volume, difficulty urinating, frequency and hematuria. Musculoskeletal:  Positive for arthralgias, back pain, gait problem, joint swelling and myalgias. Negative for neck pain and neck stiffness. GOUT DISH   Skin:  Negative for color change and rash. Allergic/Immunologic: Negative for food allergies and immunocompromised state. Neurological:  Negative for dizziness, tremors, seizures, syncope, facial asymmetry, speech difficulty, weakness, light-headedness, numbness and headaches. Hematological:  Does not bruise/bleed easily. Psychiatric/Behavioral:  Negative for agitation, behavioral problems, confusion, decreased concentration, dysphoric mood, hallucinations, self-injury, sleep disturbance and suicidal ideas. The patient is nervous/anxious. The patient is not hyperactive. Anxiety depression     Objective:     Vitals:    09/26/22 0937   BP: 120/82   Weight: (!) 358 lb 4.8 oz (162.5 kg)   Height: 6' 4\" (1.93 m)       Physical Exam  Vitals and nursing note reviewed. Constitutional:       General: He is not in acute distress. Appearance: He is well-developed. He is not diaphoretic. HENT:      Head: Normocephalic and atraumatic. Right Ear: External ear normal.      Left Ear: External ear normal.      Nose: Nose normal.      Mouth/Throat:      Pharynx: No oropharyngeal exudate. Eyes:      General: No scleral icterus.         Right eye: No discharge. Left eye: No discharge. Conjunctiva/sclera: Conjunctivae normal.      Pupils: Pupils are equal, round, and reactive to light. Neck:      Thyroid: No thyromegaly. Cardiovascular:      Rate and Rhythm: Normal rate and regular rhythm. Heart sounds: Normal heart sounds. No murmur heard. No friction rub. No gallop. Pulmonary:      Effort: Pulmonary effort is normal. No respiratory distress. Breath sounds: Normal breath sounds. No wheezing or rales. Chest:      Chest wall: No tenderness. Abdominal:      General: Bowel sounds are normal. There is no distension. Palpations: Abdomen is soft. Tenderness: There is no abdominal tenderness. There is no guarding or rebound. Musculoskeletal:      Cervical back: Full passive range of motion without pain, normal range of motion and neck supple. No edema, erythema or rigidity. No muscular tenderness. Normal range of motion. Thoracic back: Spasms, tenderness and bony tenderness present. Lumbar back: Spasms, tenderness and bony tenderness present. Decreased range of motion. Back:       Right hip: Bony tenderness present. Left hip: Bony tenderness present. Right knee: Bony tenderness present. Left knee: Bony tenderness present. Skin:     General: Skin is warm. Coloration: Skin is not pale. Findings: No erythema or rash. Neurological:      Mental Status: He is alert and oriented to person, place, and time. He is not disoriented. Cranial Nerves: No cranial nerve deficit. Sensory: No sensory deficit. Motor: Weakness present. No atrophy or abnormal muscle tone. Coordination: Coordination normal.      Gait: Gait abnormal.      Deep Tendon Reflexes: Reflexes are normal and symmetric. Babinski sign absent on the right side. Reflex Scores:       Tricep reflexes are 2+ on the right side and 2+ on the left side.        Bicep reflexes are 2+ on the right side and 2+ on the left side. Brachioradialis reflexes are 2+ on the right side and 2+ on the left side. Patellar reflexes are 2+ on the right side and 2+ on the left side. Achilles reflexes are 2+ on the right side and 2+ on the left side. Comments: LLE is shorter than right    Motor 5/5 BUE BLE    Psychiatric:         Attention and Perception: He is attentive. Mood and Affect: Mood is not anxious or depressed. Affect is not labile, blunt, angry or inappropriate. Speech: He is communicative. Speech is not rapid and pressured, delayed, slurred or tangential.         Behavior: Behavior is not agitated, slowed, aggressive, withdrawn, hyperactive or combative. Thought Content: Thought content is not paranoid or delusional. Thought content does not include homicidal or suicidal ideation. Thought content does not include homicidal or suicidal plan. Cognition and Memory: Memory is not impaired. He does not exhibit impaired recent memory or impaired remote memory. Judgment: Judgment is not impulsive or inappropriate. BECCA  Patricks test  positive  Yeoman's or Gaenslen's  positive  Kemps  positive           Assessment:     1. Spondylosis of lumbar region without myelopathy or radiculopathy    2. DISH (diffuse idiopathic skeletal hyperostosis)    3. Other chronic back pain    4. Chronic pain syndrome    5. Spinal stenosis of lumbar region without neurogenic claudication    6. SI (sacroiliac) joint inflammation (HCC)    7. Inflammatory arthritis    8. Acute idiopathic gout of right foot            Plan:      Patient read and signed orientation and opioid agreement if prescribing  OARRS reviewed. Current MED: 15  Patient was not offered naloxone for home. Discussed long term side effects of medications, tolerance, dependency and addiction. UDS possibly preformed today if needed  Patient told can not receive any pain medications from any other source.   No evidence of abuse, diversion or aberrant behavior. Medications and/or procedures to improve function and quality of life- patient understanding with this and that may not be pain free  Discussed possible weaning of medication dosing dependent on treatment/procedure results. Discussed with patient about safe storage of medications at home  Testing, Labs or Radiology Reviewed: Lumbar XRs   Labs:  CRP EDGARDO RH Sed Rate  Procedures: Lumbar Facet MBB#1 @ L4-5,5-S1 bilateral  Discussed with patient about risks with procedure including infection, reaction to medication, increased pain, or bleeding. Medications:will continue Tramadol  PRN q 8 hrs   If patient is on blood thinners will need approval to hold: yes or no: ASA  per Dr Alina Guzmán  and takes Motrin   Does patient have implanted device Stimulator, AICD or Pacemaker etc? N/A  Discussed DISH, weight loss, PT NSAIDS  Tylenol HEP and possible Rheumatology referral           Meds. Prescribed:   Orders Placed This Encounter   Medications    DISCONTD: traMADol (ULTRAM) 50 MG tablet     Sig: Take 1 tablet by mouth every 8 hours as needed for Pain for up to 14 days. Dispense:  42 tablet     Refill:  0     Reduce doses taken as pain becomes manageable         Return for Lumbar Facet MBB @ L4-5,5-S1 bilateral#1.          Electronically signed by MARCO Guadalupe CNP on 10/17/2022 at 3:05 PM

## 2022-09-26 NOTE — PROGRESS NOTES
[address]  Dept: [phone]  Dept Fax: [phone]: [phone]    Visit Date: 9/26/2022    Gearldine Opitz is a 62 y.o. male who is referred for pain management evaluation and treatment per Dr. Memo Andrews. CAGE and CAGE-AID Questions   1. In the last three months, have you felt you should cut down or stop drinking or using drugs? Yes []        No [x]     2. In the last three months, has anyone annoyed you or gotten on your nerves by telling you to cut down or stop drinking or using drugs? Yes []        No [x]     3. In the last three months, have you felt guilty or bad about how much you drink or use drugs? Yes []        No [x]     4. In the last three months, have you been waking up wanting to have an alcoholic drink or use drugs? Yes []        No [x]        Opioid Risk Tool:  Clinician Form       1. Family History of Substance Abuse: Female Male    Alcohol   []1   []3    Illegal drugs   []2   []3    Prescription drugs     []4   []4   2. Personal History of Substance Abuse:          Alcohol   []3   []3    Illegal drugs   []4   []4    Prescription drugs     []5   []5   3. Age (emory box if between 12 and 39):     []1   []1   4. History of Preadolescent Sexual Abuse:     []3   []0   5. Psychological Disease:      Attention deficit disorder, obsessive-compulsive disorder, bipolar, schizophrenia   []2   []2      Depression     []1   []1    Scoring Totals       Total Score  Low Risk  Moderate Risk  High Risk   Risk Category   0 - 3   4 - 7   8 or Above      Patient states symptoms interfere with:  A.  General Activity:  yes   B. Mood: yes    C. Walking Ability:   yes   D. Normal Work (Includes both work outside the home and housework):   yes    E.  Relations with Other People:  yes   F. Sleep:   yes   G.  Enjoyment of Life:  yes

## 2022-09-28 DIAGNOSIS — M19.90 INFLAMMATORY ARTHRITIS: ICD-10-CM

## 2022-09-28 DIAGNOSIS — M48.10 DISH (DIFFUSE IDIOPATHIC SKELETAL HYPEROSTOSIS): Primary | ICD-10-CM

## 2022-09-28 DIAGNOSIS — M10.071 ACUTE IDIOPATHIC GOUT OF RIGHT FOOT: ICD-10-CM

## 2022-09-28 LAB — ANA SCREEN: DETECTED

## 2022-09-29 LAB
ANA PATTERN: ABNORMAL
ANA TITER: ABNORMAL
ANTINUCLEAR ANTIBODY, HEP-2, IGG: DETECTED

## 2022-10-04 DIAGNOSIS — M19.90 INFLAMMATORY ARTHRITIS: ICD-10-CM

## 2022-10-04 DIAGNOSIS — G89.4 CHRONIC PAIN SYNDROME: ICD-10-CM

## 2022-10-04 DIAGNOSIS — M54.9 OTHER CHRONIC BACK PAIN: ICD-10-CM

## 2022-10-04 DIAGNOSIS — M10.071 ACUTE IDIOPATHIC GOUT OF RIGHT FOOT: ICD-10-CM

## 2022-10-04 DIAGNOSIS — M48.10 DISH (DIFFUSE IDIOPATHIC SKELETAL HYPEROSTOSIS): ICD-10-CM

## 2022-10-04 DIAGNOSIS — G89.29 OTHER CHRONIC BACK PAIN: ICD-10-CM

## 2022-10-04 DIAGNOSIS — M47.816 SPONDYLOSIS OF LUMBAR REGION WITHOUT MYELOPATHY OR RADICULOPATHY: ICD-10-CM

## 2022-10-04 DIAGNOSIS — M48.061 SPINAL STENOSIS OF LUMBAR REGION WITHOUT NEUROGENIC CLAUDICATION: ICD-10-CM

## 2022-10-04 DIAGNOSIS — M46.1 SI (SACROILIAC) JOINT INFLAMMATION (HCC): ICD-10-CM

## 2022-10-04 NOTE — TELEPHONE ENCOUNTER
OARRS reviewed. UDS: + for  tramadol consistent. Last seen: 9/26/2022.  Follow-up:   Future Appointments   Date Time Provider Willam Lezama   10/17/2022  9:30 AM MD Veronica Elkinsman Rheum [facility]   11/22/2022  8:40 AM Barbara Lawson APRN - CNP N [facility] Pain [facility] - [facility]   2/2/2023  8:00 AM MARCO Aden - TROY Deon Och Med [facility]   4/14/2023  8:30 AM Divina Bowers MD N [facility] Heart [facility] - [facility]   8/17/2023  9:30 AM MARCO Greene - CNP N [facility] CHF [facility] - [facility]

## 2022-10-04 NOTE — TELEPHONE ENCOUNTER
Tony Herring called requesting a refill on the following medications:  Requested Prescriptions     Pending Prescriptions Disp Refills    traMADol (ULTRAM) 50 MG tablet 42 tablet 0     Sig: Take 1 tablet by mouth every 8 hours as needed for Pain for up to 14 days. [facility] [address]  . pv      Date of last visit: 9/26/2022  Date of next visit (if applicable): 50/32/4485

## 2022-10-05 RX ORDER — TRAMADOL HYDROCHLORIDE 50 MG/1
50 TABLET ORAL EVERY 8 HOURS PRN
Qty: 90 TABLET | Refills: 0 | Status: SHIPPED | OUTPATIENT
Start: 2022-10-10 | End: 2022-11-01 | Stop reason: SDUPTHER

## 2022-10-17 DIAGNOSIS — M48.10 DISH (DIFFUSE IDIOPATHIC SKELETAL HYPEROSTOSIS): ICD-10-CM

## 2022-10-17 DIAGNOSIS — M10.071 ACUTE IDIOPATHIC GOUT OF RIGHT FOOT: ICD-10-CM

## 2022-10-17 DIAGNOSIS — M19.90 INFLAMMATORY ARTHRITIS: Primary | ICD-10-CM

## 2022-10-31 ENCOUNTER — NURSE ONLY (OUTPATIENT)
Dept: LAB | Age: 57
End: 2022-10-31

## 2022-10-31 ENCOUNTER — TELEPHONE (OUTPATIENT)
Dept: CARDIOLOGY CLINIC | Age: 57
End: 2022-10-31

## 2022-10-31 DIAGNOSIS — I50.32 CHRONIC DIASTOLIC CONGESTIVE HEART FAILURE, NYHA CLASS 2 (HCC): ICD-10-CM

## 2022-10-31 DIAGNOSIS — E87.5 HYPERKALEMIA: Primary | ICD-10-CM

## 2022-10-31 LAB
ANION GAP SERPL CALCULATED.3IONS-SCNC: 13 MEQ/L (ref 8–16)
BUN BLDV-MCNC: 26 MG/DL (ref 7–22)
CALCIUM SERPL-MCNC: 9.4 MG/DL (ref 8.5–10.5)
CHLORIDE BLD-SCNC: 101 MEQ/L (ref 98–111)
CO2: 29 MEQ/L (ref 23–33)
CREAT SERPL-MCNC: 1.3 MG/DL (ref 0.4–1.2)
GFR SERPL CREATININE-BSD FRML MDRD: > 60 ML/MIN/1.73M2
GLUCOSE BLD-MCNC: 105 MG/DL (ref 70–108)
POTASSIUM SERPL-SCNC: 5.1 MEQ/L (ref 3.5–5.2)
SODIUM BLD-SCNC: 143 MEQ/L (ref 135–145)

## 2022-10-31 NOTE — TELEPHONE ENCOUNTER
----- Message from MRACO Mack - CNP sent at 10/31/2022  4:20 PM EDT -----  Cr is ok since starting the Aryan Holden.  Need to stop aldactone, K is 5.1. K level in 4 weeks

## 2022-11-01 ENCOUNTER — OFFICE VISIT (OUTPATIENT)
Dept: RHEUMATOLOGY | Age: 57
End: 2022-11-01
Payer: MEDICARE

## 2022-11-01 VITALS
SYSTOLIC BLOOD PRESSURE: 124 MMHG | OXYGEN SATURATION: 93 % | HEART RATE: 87 BPM | BODY MASS INDEX: 38.36 KG/M2 | DIASTOLIC BLOOD PRESSURE: 78 MMHG | HEIGHT: 76 IN | WEIGHT: 315 LBS

## 2022-11-01 DIAGNOSIS — M54.9 OTHER CHRONIC BACK PAIN: ICD-10-CM

## 2022-11-01 DIAGNOSIS — M10.071 ACUTE IDIOPATHIC GOUT OF RIGHT FOOT: ICD-10-CM

## 2022-11-01 DIAGNOSIS — M1A.39X0 CHRONIC GOUT DUE TO RENAL IMPAIRMENT OF MULTIPLE SITES WITHOUT TOPHUS: ICD-10-CM

## 2022-11-01 DIAGNOSIS — M19.90 INFLAMMATORY ARTHRITIS: ICD-10-CM

## 2022-11-01 DIAGNOSIS — M48.10 DISH (DIFFUSE IDIOPATHIC SKELETAL HYPEROSTOSIS): ICD-10-CM

## 2022-11-01 DIAGNOSIS — M47.816 SPONDYLOSIS OF LUMBAR REGION WITHOUT MYELOPATHY OR RADICULOPATHY: ICD-10-CM

## 2022-11-01 DIAGNOSIS — M48.061 SPINAL STENOSIS OF LUMBAR REGION WITHOUT NEUROGENIC CLAUDICATION: ICD-10-CM

## 2022-11-01 DIAGNOSIS — M46.1 SI (SACROILIAC) JOINT INFLAMMATION (HCC): ICD-10-CM

## 2022-11-01 DIAGNOSIS — N18.30 STAGE 3 CHRONIC KIDNEY DISEASE, UNSPECIFIED WHETHER STAGE 3A OR 3B CKD (HCC): ICD-10-CM

## 2022-11-01 DIAGNOSIS — G89.4 CHRONIC PAIN SYNDROME: ICD-10-CM

## 2022-11-01 DIAGNOSIS — G89.29 OTHER CHRONIC BACK PAIN: ICD-10-CM

## 2022-11-01 DIAGNOSIS — R76.8 POSITIVE ANA (ANTINUCLEAR ANTIBODY): ICD-10-CM

## 2022-11-01 DIAGNOSIS — M47.816 SPONDYLOSIS OF LUMBAR REGION WITHOUT MYELOPATHY OR RADICULOPATHY: Primary | ICD-10-CM

## 2022-11-01 PROCEDURE — 3074F SYST BP LT 130 MM HG: CPT | Performed by: INTERNAL MEDICINE

## 2022-11-01 PROCEDURE — 3078F DIAST BP <80 MM HG: CPT | Performed by: INTERNAL MEDICINE

## 2022-11-01 PROCEDURE — 99204 OFFICE O/P NEW MOD 45 MIN: CPT | Performed by: INTERNAL MEDICINE

## 2022-11-01 RX ORDER — SPIRONOLACTONE 25 MG/1
25 TABLET ORAL DAILY
COMMUNITY

## 2022-11-01 RX ORDER — TRAMADOL HYDROCHLORIDE 50 MG/1
50 TABLET ORAL EVERY 8 HOURS PRN
Qty: 90 TABLET | Refills: 0 | Status: SHIPPED | OUTPATIENT
Start: 2022-11-07 | End: 2022-12-01 | Stop reason: SDUPTHER

## 2022-11-01 ASSESSMENT — ENCOUNTER SYMPTOMS
VOMITING: 0
BLOOD IN STOOL: 0
COUGH: 0
ABDOMINAL PAIN: 0
NAUSEA: 0
SHORTNESS OF BREATH: 0

## 2022-11-01 NOTE — TELEPHONE ENCOUNTER
OARRS reviewed. UDS: + for  Tramadol. Non-Compliant with Trazodone. Last seen: 9/26/2022.  Follow-up:   Future Appointments   Date Time Provider Willam Lezama   11/22/2022  8:40 AM MARCO Mathew CNP N [facility] Pain P - [place]   2/2/2023  8:00 AM MARCO Fields CNP [facility] [place]   4/14/2023  8:30 AM Divina Luna MD N SRPX Heart P - [place]   8/17/2023  9:30 AM MARCO Mack CNP N SRPX CHF P - [place]

## 2022-11-01 NOTE — LETTER
[address] [facility]  [address] [facility]  Phone: [phone]  Fax: Gerhard Greer MD      November 1, 2022     Patient: Mukul Ruiz   MR Number: [id number]   YOB: 1965   Date of Visit: 11/1/2022       Dear Dr. Medina Ratliff:    Thank you for referring Jennifer Fields to me for evaluation/treatment. Below are the relevant portions of my assessment and plan of care. If you have questions, please do not hesitate to call me. I look forward to following [place] along with you.     Sincerely,        Naila Atwood MD    CC providers:  MARCO Amezcua - CNP  [address] [address]  [address] [employer]

## 2022-11-01 NOTE — TELEPHONE ENCOUNTER
Ok Bias called requesting a refill on the following medications:  Requested Prescriptions     Pending Prescriptions Disp Refills    traMADol (ULTRAM) 50 MG tablet 90 tablet 0     Sig: Take 1 tablet by mouth every 8 hours as needed for Pain for up to 30 days. [facility]acy [facility]   . pv      Date of last visit:   Date of next visit (if applicable): 54/47/9101

## 2022-11-01 NOTE — PROGRESS NOTES
[address] [facility]   Rheumatology Clinic Note      11/1/2022       Reason for Consult:  DISH, positive EDGARDO  Requesting Physician:  Tyrel Guzmán, APRN - *     CHIEF COMPLAINT:    Chief Complaint   Patient presents with    New Patient     hyperostosis           HISTORY OF PRESENT ILLNESS:    62 y.o. male presents today for evaluation of DISH and positive EDGARDO. Has h/o gout. Is on allopurinol 300 mg daily. Last gout flare up was 3 years ago. No h/o kidney stones. Tolerating medication well. Pain is mainly in the lower back. Pain for years. Progressively worsening. No prolonged morning stiffness. Episodic, achy nonradiaitng. Tramadol eases his pain significantly. No pain upon awakening in the morning. Rates his pain at 3/10 today. Prolonged walking aggravates the pain. Is planned to get epidural nerve block injections by pain management. No other joint pain. No joint swelling. Positive EDGARDO. No skin rash, photosensitivity, oral ulcers. No h/o pericarditis or pleurisy. No reported h/o low blood cell counts. Past Medical History:     has a past medical history of Anxiety, Arrhythmia, Arthritis, CAD (coronary artery disease), Chest pain, CHF (congestive heart failure) ([address] Chronic back pain, COPD (chronic obstructive pulmonary disease) ([address] Depression, Diverticula of colon, Fatigue, Heart attack ([address] Hyperlipidemia, Hypertension, Panic attacks, Pneumonia, Sleep apnea, SOB (shortness of breath), Spondylosis, and Swelling. Past Surgical History:     has a past surgical history that includes Cardiac catheterization (01/30/2008); cardiovascular stress test (02/16/2009); transthoracic echocardiogram (07/22/2011); pre-malignant / benign skin lesion excision (5/17/12); and Colonoscopy.     Current Medications:      Current Outpatient Medications:     spironolactone (ALDACTONE) 25 MG tablet, Take 25 mg by mouth daily, Disp: , Rfl:     raNITIdine HCl (RANITIDINE 150 MAX STRENGTH PO), Take 150 mg by mouth daily, Disp: , Rfl:     traMADol (ULTRAM) 50 MG tablet, Take 1 tablet by mouth every 8 hours as needed for Pain for up to 30 days. , Disp: 90 tablet, Rfl: 0    dapagliflozin (FARXIGA) 10 MG tablet, Take 1 tablet by mouth every morning, Disp: 90 tablet, Rfl: 3    albuterol sulfate HFA (VENTOLIN HFA) 108 (90 Base) MCG/ACT inhaler, Inhale 2 puffs into the lungs 4 times daily as needed for Wheezing, Disp: 18 g, Rfl: 0    traZODone (DESYREL) 50 MG tablet, Take 50 mg by mouth nightly, Disp: , Rfl:     umeclidinium-vilanterol (ANORO ELLIPTA) 62.5-25 MCG/INH AEPB inhaler, Inhale 1 puff into the lungs daily, Disp: , Rfl:     triamcinolone (KENALOG) 0.1 % cream, Apply topically 2 times daily Apply topically 2 times daily. , Disp: , Rfl:     CPAP Machine MISC, Please provide a new CPAP machine with pressure of 10. The new CPAP machine must have capabilities to give down load report regarding >4hour compliance and residual AHI. PAP must have auto capability and detailed download capability, Disp: 1 each, Rfl: 0    ibuprofen (ADVIL;MOTRIN) 800 MG tablet, TAKE 1 TABLET BY MOUTH EVERY 8 HOURS AS NEEDED, Disp: , Rfl:     lisinopril (PRINIVIL;ZESTRIL) 10 MG tablet, TAKE 1 TABLET BY MOUTH ONCE DAILY, Disp: , Rfl:     vitamin C (VITAMIN C) 500 MG tablet, Take 1 tablet by mouth daily, Disp: 30 tablet, Rfl: 3    acetaminophen (APAP EXTRA STRENGTH) 500 MG tablet, Take 1 tablet by mouth every 6 hours as needed for Pain, Disp: 120 tablet, Rfl: 0    allopurinol (ZYLOPRIM) 300 MG tablet, Take 1 tablet by mouth daily, Disp: 30 tablet, Rfl: 5    atorvastatin (LIPITOR) 20 MG tablet, TAKE 1 TABLET BY MOUTH ONE TIME A DAY, Disp: 30 tablet, Rfl: 5    bumetanide (BUMEX) 0.5 MG tablet, Take 2 tablets daily. If weight gain 3Ibs in 1 day or 5Ibs in 1 week, take 2 tablets in AM and PM as needed. , Disp: 60 tablet, Rfl: 5    carvedilol (COREG) 25 MG tablet, Take 1 tablet by mouth 2 times daily, Disp: 60 tablet, Rfl: 5    meclizine (ANTIVERT) 25 MG tablet, Take 1 tablet by mouth 3 times daily as needed, Disp: 60 tablet, Rfl: 5    carBAMazepine (TEGRETOL) 200 MG tablet, Take 200 mg by mouth 2 times daily , Disp: , Rfl:     risperiDONE (RISPERDAL) 2 MG tablet, Take 0.5 mg by mouth 2 times daily , Disp: , Rfl:     citalopram (CELEXA) 20 MG tablet, Take 1 tablet by mouth daily. , Disp: 30 tablet, Rfl: 5    aspirin EC 81 MG EC tablet, Take 81 mg by mouth daily. With food; blood thinner, Disp: , Rfl:     bisacodyl (DULCOLAX) 5 MG EC tablet, See Prep Instructions (Patient not taking: Reported on 11/1/2022), Disp: 4 tablet, Rfl: 0    polyethylene glycol (GLYCOLAX) 17 GM/SCOOP powder, Dispense 238 Gram Bottle. Use as Directed (Patient not taking: Reported on 11/1/2022), Disp: 238 g, Rfl: 0    Blood Pressure Monitoring (BLOOD PRESSURE MONITOR AUTOMAT) GENET, 1 applicator by Does not apply route daily, Disp: 1 each, Rfl: 0    dicyclomine (BENTYL) 10 MG capsule, Take 1 capsule by mouth every 6 hours as needed (cramps) (Patient not taking: Reported on 11/1/2022), Disp: 20 capsule, Rfl: 0    ondansetron (ZOFRAN ODT) 4 MG disintegrating tablet, Take 1 tablet by mouth every 8 hours as needed for Nausea (Patient not taking: Reported on 11/1/2022), Disp: 20 tablet, Rfl: 0    metFORMIN (GLUCOPHAGE-XR) 500 MG extended release tablet, Take 500 mg by mouth daily (with breakfast) (Patient not taking: Reported on 11/1/2022), Disp: , Rfl:     Blood Pressure KIT, This is a prescription for a blood pressure cuff for at home use., Disp: 1 kit, Rfl: 0    Allergies:    Losartan potassium and Mobic [meloxicam]    Social History:     reports that he has never smoked. He has been exposed to tobacco smoke. He has never used smokeless tobacco. He reports current alcohol use. He reports that he does not use drugs.     Family History:   family history includes Cancer in his maternal grandmother; Colon Cancer in his maternal grandfather, maternal grandmother, paternal grandfather, and paternal uncle; Colon Cancer (age of onset: 52) in his sister; Colon Cancer (age of onset: 68) in his father; Coronary Art Dis in his father; High Blood Pressure in his father; High Cholesterol in his father. REVIEW OF SYSTEMS:    Review of Systems   Constitutional:  Negative for chills and fever. HENT:  Negative for mouth sores. Respiratory:  Negative for cough and shortness of breath. Cardiovascular:  Negative for chest pain and leg swelling. Gastrointestinal:  Negative for abdominal pain, blood in stool, nausea and vomiting. Skin:  Negative for rash (no psoriasis). Neurological:  Negative for headaches. PHYSICAL EXAM:    Vitals:    /78 (Site: Left Lower Arm, Position: Sitting, Cuff Size: Medium Adult)   Pulse 87   Ht 6' 3.98\" (1.93 m)   Wt (!) 348 lb 6.4 oz (158 kg)   SpO2 93%   BMI 42.43 kg/m²     Physical Exam  Constitutional:       General: He is not in acute distress. Appearance: Normal appearance. Eyes:      Conjunctiva/sclera: Conjunctivae normal.   Pulmonary:      Effort: Pulmonary effort is normal.   Musculoskeletal:         General: No swelling or deformity. Normal range of motion. Cervical back: Neck supple. Right lower leg: No edema. Left lower leg: No edema. Lymphadenopathy:      Cervical: No cervical adenopathy. Skin:     General: Skin is warm and dry. Findings: No lesion or rash. Neurological:      General: No focal deficit present. Mental Status: He is alert and oriented to person, place, and time. Mental status is at baseline.       Gait: Gait normal.   Psychiatric:         Mood and Affect: Mood normal.          DATA:     Latest Reference Range & Units 10/31/22 08:06   Sodium 135 - 145 meq/L 143   Potassium 3.5 - 5.2 meq/L 5.1   Chloride 98 - 111 meq/L 101   CO2 23 - 33 meq/L 29   BUN,BUNPL 7 - 22 mg/dL 26 (H)   Creatinine 0.4 - 1.2 mg/dL 1.3 (H)   Anion Gap 8.0 - 16.0 meq/L 13.0   Est, Glom Filt Rate >60 ml/min/1.73m2 >60   Glucose, Random 70 - 108 mg/dL 105   CALCIUM, SERUM, 467991 8.5 - 10.5 mg/dL 9.4      Latest Reference Range & Units 9/26/22 11:08   CRP 0.00 - 1.00 mg/dl 1.42 (H)      Latest Reference Range & Units Most Recent   WBC 4.8 - 10.8 thou/mm3 5.8  1/28/22 08:14   RBC 4.70 - 6.10 mill/mm3 5.14  1/28/22 08:14   Hemoglobin Quant 14.0 - 18.0 gm/dl 14.5  1/28/22 08:14   Hematocrit 42.0 - 52.0 % 46.4  1/28/22 08:14   MCV 80.0 - 94.0 fL 90.3  1/28/22 08:14   MCH 26.0 - 33.0 pg 28.2  1/28/22 08:14   MCHC 32.2 - 35.5 gm/dl 31.3 (L)  1/28/22 08:14   MPV 9.4 - 12.4 fL 12.0  1/28/22 08:14   RDW 11.5 - 14.5 % 16.4 (H)  6/10/18 01:35   RDW-CV 11.5 - 14.5 % 14.9 (H)  1/28/22 08:14   RDW-SD 35.0 - 45.0 fL 48.9 (H)  1/28/22 08:14   Platelet Count 588 - 400 thou/mm3 175  1/28/22 08:14      Latest Reference Range & Units 9/26/22 11:08   Sed Rate 0 - 10 mm/hr 15 (H)      Latest Reference Range & Units 9/20/11 17:10 1/20/21 09:47 1/28/22 08:14   URIC ACID,URA 3.7 - 7.0 mg/dL 5.7 6.1 6.2      Latest Reference Range & Units Most Recent   EDGARDO PATTERN  Homogeneous ! 9/29/22 19:59   EDGARDO SCREEN None Detected  Detected ! 9/26/22 11:08   EDGARDO Titer  1:80 !  9/29/22 19:59   Rheumatoid Factor 0 - 13 IU/mL < 10  9/26/22 11:08   Antinuclear Antibody, Hep-2, IGG <1:80  Detected (H)  9/26/22 11:08         IMPRESSION/RECOMMENDATIONS:      1. Degernative disc disease lumbar and lower thoracic, DISH: pt is not describing features of inflammatory arthritis. His back pain is related to degenerative disc disease rather than inflmamtory back pain. Reviewed with pt xrays of lumbar spine images in chart (2017) that shows features of DDD lumbar spine and DISH at the thoraco-lumbar region. 2. Positive EDGARDO: low titer. Pt is not exhibiting features of lupus. Suspect this to be a false positive test. Reassured pt. No indication for further work up at this time. 3. H/o chronic gout likely related to CKD stage 3: no gout flare ups historically over past 3 months.  Is on allopurinol 300 mg daily. Last uric acid in file was 6. 2. very close to goal of <6. Since clinically pt is doing well, hold off on adjusting allopurinol dose. Answered pt's questions to his satisfaction. RTC prn. No orders of the defined types were placed in this encounter.        Sheila Cooper MD    [address] [facility]  [address] [facility]. [address]  [phone]

## 2022-11-02 ENCOUNTER — PREP FOR PROCEDURE (OUTPATIENT)
Dept: PHYSICAL MEDICINE AND REHAB | Age: 57
End: 2022-11-02

## 2022-11-04 NOTE — DISCHARGE INSTRUCTIONS
ANESTHESIA INSTRUCTIONS FOLLOWING SURGERY        Since you may experience some intermittent light-headedness for the next several hours, we suggest you plan on bed rest or quiet relaxation this evening. You must have a friend or relative stay with you tonight. Because of the sedation you have received, it is recommended that you do not drive a motor vehicle, operate any kind of machinery, or sign any contractual agreement for 24 hours following the procedure. You should not take alcoholic beverages tonight and only take sleeping medication that has been specifically prescribed for you by your physician. Call office [phone] if you have:  Temperature greater than 100.4  Persistent nausea and vomiting  Severe uncontrolled pain  Redness, tenderness, or signs of infection (pain, swelling, redness, odor or green/yellow discharge around the site)  Difficulty breathing, headache or visual disturbances  Hives  Persistent dizziness or light-headedness  Extreme fatigue  Any other questions or concerns you may have after discharge    In an emergency, call 911 or go to an Emergency Department at a nearby hospital    It is important to bring a complete, current list of your medications to any medical appointments or hospitalizations. REMINDER:   Carry a list of your medications and allergies with you at all times  Call your pharmacy at least 1 week in advance to refill prescriptions    Diet: Resume your usual diet. Good nutrition promotes healing. Increase fluid intake. Activity: Rest for 24 hrs then resume normal activity. HOME MEDICATIONS:      If on blood thinning products such as; Aspirin, NSAIDS, Plavix, Coumadin, Xarelto, Fish Oil, Multi-Vitamins or Herbal Supplements restart in 24 hours      Restart Metformin in 48 hours if you had procedure with dye. Restart Metformin in 24 hours if no dye used during procedure.         Education Materials Received: {yes/no:835021}  Belongings Returned: {yes/no:000588}          I understand and acknowledge receipt of the above instructions. Patient or Guardian Signature                                                         Date/Time                                                                                                                                            Physician's or R.N.'s Signature                                                                  Date/Time      The discharge instructions have been reviewed with the patient and/or Guardian. Patient and/or Guardian signed and retained a printed copy. Call office [phone] if you have:  Temperature greater than 100.4  Persistent nausea and vomiting  Severe uncontrolled pain  Redness, tenderness, or signs of infection (pain, swelling, redness, odor or green/yellow discharge around the site)  Difficulty breathing, headache or visual disturbances  Hives  Persistent dizziness or light-headedness  Extreme fatigue  Any other questions or concerns you may have after discharge    In an emergency, call 911 or go to an Emergency Department at a nearby hospital    It is important to bring a complete, current list of your medications to any medical appointments or hospitalizations. REMINDER:   Carry a list of your medications and allergies with you at all times  Call your pharmacy at least 1 week in advance to refill prescriptions    Diet: Resume your usual diet. Good nutrition promotes healing. Increase fluid intake. Activity: Rest for 24 hrs then resume normal activity. Education Materials Received: {yes/no:731387}  Belongings Returned: {yes/no:[id number]}          I understand and acknowledge receipt of the above instructions.

## 2022-11-07 ENCOUNTER — ANESTHESIA EVENT (OUTPATIENT)
Dept: OPERATING ROOM | Age: 57
End: 2022-11-07
Payer: MEDICARE

## 2022-11-07 ENCOUNTER — APPOINTMENT (OUTPATIENT)
Dept: GENERAL RADIOLOGY | Age: 57
End: 2022-11-07
Attending: PAIN MEDICINE
Payer: MEDICARE

## 2022-11-07 ENCOUNTER — HOSPITAL ENCOUNTER (OUTPATIENT)
Age: 57
Setting detail: OUTPATIENT SURGERY
Discharge: HOME | End: 2022-11-07
Attending: PAIN MEDICINE | Admitting: PAIN MEDICINE
Payer: MEDICARE

## 2022-11-07 ENCOUNTER — ANESTHESIA (OUTPATIENT)
Dept: OPERATING ROOM | Age: 57
End: 2022-11-07
Payer: MEDICARE

## 2022-11-07 VITALS
HEART RATE: 86 BPM | HEIGHT: 76 IN | TEMPERATURE: 98.7 F | SYSTOLIC BLOOD PRESSURE: 127 MMHG | WEIGHT: 315 LBS | RESPIRATION RATE: 16 BRPM | BODY MASS INDEX: 38.36 KG/M2 | OXYGEN SATURATION: 97 % | DIASTOLIC BLOOD PRESSURE: 72 MMHG

## 2022-11-07 PROBLEM — M47.816 LUMBAR SPONDYLOSIS: Status: ACTIVE | Noted: 2022-11-07

## 2022-11-07 LAB — GLUCOSE BLD-MCNC: 96 MG/DL (ref 70–108)

## 2022-11-07 PROCEDURE — 6360000002 HC RX W HCPCS: Performed by: PAIN MEDICINE

## 2022-11-07 PROCEDURE — 2709999900 HC NON-CHARGEABLE SUPPLY: Performed by: PAIN MEDICINE

## 2022-11-07 PROCEDURE — 82948 REAGENT STRIP/BLOOD GLUCOSE: CPT

## 2022-11-07 PROCEDURE — 3700000000 HC ANESTHESIA ATTENDED CARE: Performed by: PAIN MEDICINE

## 2022-11-07 PROCEDURE — 3209999900 FLUORO FOR SURGICAL PROCEDURES

## 2022-11-07 PROCEDURE — 7100000010 HC PHASE II RECOVERY - FIRST 15 MIN: Performed by: PAIN MEDICINE

## 2022-11-07 PROCEDURE — 2500000003 HC RX 250 WO HCPCS: Performed by: PAIN MEDICINE

## 2022-11-07 PROCEDURE — 3700000001 HC ADD 15 MINUTES (ANESTHESIA): Performed by: PAIN MEDICINE

## 2022-11-07 PROCEDURE — 3600000056 HC PAIN LEVEL 4 BASE: Performed by: PAIN MEDICINE

## 2022-11-07 PROCEDURE — 2500000003 HC RX 250 WO HCPCS: Performed by: NURSE ANESTHETIST, CERTIFIED REGISTERED

## 2022-11-07 PROCEDURE — 64494 INJ PARAVERT F JNT L/S 2 LEV: CPT | Performed by: PAIN MEDICINE

## 2022-11-07 PROCEDURE — 7100000011 HC PHASE II RECOVERY - ADDTL 15 MIN: Performed by: PAIN MEDICINE

## 2022-11-07 PROCEDURE — 3600000057 HC PAIN LEVEL 4 ADDL 15 MIN: Performed by: PAIN MEDICINE

## 2022-11-07 PROCEDURE — 64493 INJ PARAVERT F JNT L/S 1 LEV: CPT | Performed by: PAIN MEDICINE

## 2022-11-07 RX ORDER — BUPIVACAINE HYDROCHLORIDE 2.5 MG/ML
INJECTION, SOLUTION EPIDURAL; INFILTRATION; INTRACAUDAL PRN
Status: DISCONTINUED | OUTPATIENT
Start: 2022-11-07 | End: 2022-11-07 | Stop reason: ALTCHOICE

## 2022-11-07 RX ORDER — LIDOCAINE HYDROCHLORIDE 20 MG/ML
INJECTION, SOLUTION EPIDURAL; INFILTRATION; INTRACAUDAL; PERINEURAL PRN
Status: DISCONTINUED | OUTPATIENT
Start: 2022-11-07 | End: 2022-11-07 | Stop reason: SDUPTHER

## 2022-11-07 RX ORDER — METHYLPREDNISOLONE ACETATE 80 MG/ML
INJECTION, SUSPENSION INTRA-ARTICULAR; INTRALESIONAL; INTRAMUSCULAR; SOFT TISSUE PRN
Status: DISCONTINUED | OUTPATIENT
Start: 2022-11-07 | End: 2022-11-07 | Stop reason: ALTCHOICE

## 2022-11-07 RX ORDER — PROPOFOL 10 MG/ML
INJECTION, EMULSION INTRAVENOUS PRN
Status: DISCONTINUED | OUTPATIENT
Start: 2022-11-07 | End: 2022-11-07 | Stop reason: SDUPTHER

## 2022-11-07 RX ORDER — LIDOCAINE HYDROCHLORIDE 10 MG/ML
INJECTION, SOLUTION INFILTRATION; PERINEURAL PRN
Status: DISCONTINUED | OUTPATIENT
Start: 2022-11-07 | End: 2022-11-07 | Stop reason: ALTCHOICE

## 2022-11-07 RX ADMIN — PROPOFOL 20 MG: 10 INJECTION, EMULSION INTRAVENOUS at 10:27

## 2022-11-07 RX ADMIN — PROPOFOL 50 MG: 10 INJECTION, EMULSION INTRAVENOUS at 10:25

## 2022-11-07 RX ADMIN — LIDOCAINE HYDROCHLORIDE 80 MG: 20 INJECTION, SOLUTION EPIDURAL; INFILTRATION; INTRACAUDAL; PERINEURAL at 10:22

## 2022-11-07 RX ADMIN — PROPOFOL 50 MG: 10 INJECTION, EMULSION INTRAVENOUS at 10:23

## 2022-11-07 RX ADMIN — PROPOFOL 50 MG: 10 INJECTION, EMULSION INTRAVENOUS at 10:24

## 2022-11-07 RX ADMIN — PROPOFOL 50 MG: 10 INJECTION, EMULSION INTRAVENOUS at 10:22

## 2022-11-07 ASSESSMENT — PAIN DESCRIPTION - DESCRIPTORS: DESCRIPTORS: ACHING

## 2022-11-07 ASSESSMENT — PAIN - FUNCTIONAL ASSESSMENT: PAIN_FUNCTIONAL_ASSESSMENT: 0-10

## 2022-11-07 ASSESSMENT — PAIN SCALES - GENERAL: PAINLEVEL_OUTOF10: 0

## 2022-11-07 NOTE — H&P
[address] [address]  History and Physical Update    Pt Name: Corie Powers. MRN: [id number]  YOB: 1965  Date of evaluation: 11/7/2022      I have examined the patient and reviewed the H&P/Consult and there are no changes to the patient or plans.         Electronically signed by Lucile Siemens, MD on 11/7/2022 at 9:26 AM

## 2022-11-07 NOTE — ANESTHESIA PRE PROCEDURE
Department of Anesthesiology  Preprocedure Note       Name:  Soniya Deluna. Age:  62 y.o.  :  1965                                          MRN:  [id number]         Date:  2022      Surgeon: Yee Munoz):  Dan Kehr, MD    Procedure: Procedure(s):  Lumbar Facet Medial Branch Block Lumbar 4-5,5-Sacral 1 bilateral    Medications prior to admission:   Prior to Admission medications    Medication Sig Start Date End Date Taking? Authorizing Provider   spironolactone (ALDACTONE) 25 MG tablet Take 25 mg by mouth daily  Patient not taking: Reported on 2022    Historical Provider, MD   raNITIdine HCl (RANITIDINE 150 MAX STRENGTH PO) Take 150 mg by mouth daily    Historical Provider, MD   traMADol (ULTRAM) 50 MG tablet Take 1 tablet by mouth every 8 hours as needed for Pain for up to 30 days. 22  MARCO Corado CNP   dapagliflozin (FARXIGA) 10 MG tablet Take 1 tablet by mouth every morning 22   MARCO Reveles CNP   bisacodyl (DULCOLAX) 5 MG EC tablet See Prep Instructions  Patient not taking: No sig reported 22   MARCO Turcios CNP   polyethylene glycol (GLYCOLAX) 17 GM/SCOOP powder Dispense 238 Gram Bottle. Use as Directed  Patient not taking: No sig reported 22   MARCO Turcios CNP   albuterol sulfate HFA (VENTOLIN HFA) 108 (90 Base) MCG/ACT inhaler Inhale 2 puffs into the lungs 4 times daily as needed for Wheezing 2/10/22   MARCO Wilson CNP   traZODone (DESYREL) 50 MG tablet Take 50 mg by mouth nightly    Historical Provider, MD   umeclidinium-vilanterol (ANORO ELLIPTA) 62.5-25 MCG/INH AEPB inhaler Inhale 1 puff into the lungs daily    Historical Provider, MD   triamcinolone (KENALOG) 0.1 % cream Apply topically 2 times daily Apply topically 2 times daily. Historical Provider, MD   CPAP Machine MISC Please provide a new CPAP machine with pressure of 10.  The new CPAP machine must have capabilities to give down load report regarding >4hour compliance and residual AHI. PAP must have auto capability and detailed download capability 2/1/22   MARCO Thibodeaux Aas - CNP   ibuprofen (ADVIL;MOTRIN) 800 MG tablet TAKE 1 TABLET BY MOUTH EVERY 8 HOURS AS NEEDED 9/28/21   Historical Provider, MD   lisinopril (PRINIVIL;ZESTRIL) 10 MG tablet TAKE 1 TABLET BY MOUTH ONCE DAILY 8/9/21   Historical Provider, MD   Blood Pressure Monitoring (BLOOD PRESSURE MONITOR AUTOMAT) GENET 1 applicator by Does not apply route daily 10/7/21   MARCO Daniels - CNP   vitamin C (VITAMIN C) 500 MG tablet Take 1 tablet by mouth daily  Patient not taking: Reported on 11/7/2022 11/29/20   Simba Escalona MD   dicyclomine (BENTYL) 10 MG capsule Take 1 capsule by mouth every 6 hours as needed (cramps)  Patient not taking: No sig reported 12/1/19   Jose Carlos Wallace PA-C   ondansetron (ZOFRAN ODT) 4 MG disintegrating tablet Take 1 tablet by mouth every 8 hours as needed for Nausea  Patient not taking: No sig reported 12/1/19   Jose Carlos Wallace PA-C   acetaminophen (APAP EXTRA STRENGTH) 500 MG tablet Take 1 tablet by mouth every 6 hours as needed for Pain 10/23/19   Jose Carlos Wallace PA-C   metFORMIN (GLUCOPHAGE-XR) 500 MG extended release tablet Take 500 mg by mouth daily (with breakfast)    Historical Provider, MD   allopurinol (ZYLOPRIM) 300 MG tablet Take 1 tablet by mouth daily 9/26/16   Chava Orta MD   atorvastatin (LIPITOR) 20 MG tablet TAKE 1 TABLET BY MOUTH ONE TIME A DAY 9/26/16   Chava Orta MD   bumetanide (BUMEX) 0.5 MG tablet Take 2 tablets daily. If weight gain 3Ibs in 1 day or 5Ibs in 1 week, take 2 tablets in AM and PM as needed.  9/26/16   Chava Orta MD   carvedilol (COREG) 25 MG tablet Take 1 tablet by mouth 2 times daily 9/26/16   Chava Orta MD   meclizine (ANTIVERT) 25 MG tablet Take 1 tablet by mouth 3 times daily as needed 7/5/16   Chava Orta MD   carBAMazepine (TEGRETOL) 200 MG tablet Take 200 mg by mouth 2 times daily     Historical Provider, MD   Blood Pressure KIT This is a prescription for a blood pressure cuff for at home use. 9/17/15   Clark Landry MD   risperiDONE (RISPERDAL) 2 MG tablet Take 0.5 mg by mouth 2 times daily     Historical Provider, MD   citalopram (CELEXA) 20 MG tablet Take 1 tablet by mouth daily. 2/11/13   Clark Landry MD   aspirin EC 81 MG EC tablet Take 81 mg by mouth daily. With food; blood thinner    Historical Provider, MD       Current medications:    No current facility-administered medications for this encounter. Allergies: Allergies   Allergen Reactions    Losartan Potassium     Mobic [Meloxicam]        Problem List:    Patient Active Problem List   Diagnosis Code    Depression F32. A    Vertigo R42    Chronic pain syndrome G89.4    CHF (congestive heart failure) ([facility]) I50.9    Morbid obesity ([facility]) E66.01    Gout M10.9    Hyperlipidemia E78.5    Arrhythmia I49.9    Anxiety F41.9    Panic attacks F41.0    Fatigue R53.83    Swelling R60.9    Bronchitis J40    Dyspnea R06.00    Cardiomyopathy, nonischemic ([facility]) I42.8    Hypercapnia R06.89    Bronchospasm J98.01    Spondylolysis M43.00    Anterolisthesis M43.10    Constipation, chronic K59.09    Lipoma of back D17.1    COPD (chronic obstructive pulmonary disease) ([facility]) J44.9    Chest pain R07.9    Acute respiratory failure with hypoxia and hypercarbia ([facility]) J96.01, J96.02    Shortness of breath R06.02    Acute on chronic respiratory failure with hypoxia and hypercapnia ([facility]) J96.21, J96.22    Cor pulmonale, chronic ([facility]) I27.81    Acute on chronic diastolic congestive heart failure ([facility]) I50.33    Hypokalemia E87.6    Hypernatremia E87.0    Essential hypertension I10    Chronic obstructive pulmonary disease with acute exacerbation ([facility]) J44.1    Coronary artery disease involving native coronary artery of native heart without angina pectoris I25.10    Chronic back pain M54.9, G89.29    Acute respiratory acidosis (HCC) J96.02    Ventricular tachycardia I47.20    Congestive heart failure, NYHA class 3 (HCC) I50.9    KRISTEN on CPAP G47.33, Z99.89    Chronic combined systolic and diastolic heart failure (HCC) I50.42    COVID-19 U07.1       Past Medical History:        Diagnosis Date    Anxiety     Arrhythmia     Arthritis     CAD (coronary artery disease)     Chest pain     HX OF:    CHF (congestive heart failure) (HCC)     Chronic back pain     COPD (chronic obstructive pulmonary disease) (HCC)     Depression     Diverticula of colon     Fatigue     Heart attack (HCC)     Hyperlipidemia     Hypertension     Panic attacks     Pneumonia     Sleep apnea     SOB (shortness of breath)     Spondylosis     sponylolisthesis L5    Swelling        Past Surgical History:        Procedure Laterality Date    CARDIAC CATHETERIZATION  01/30/2008    EF 20%    CARDIOVASCULAR STRESS TEST  02/16/2009    EF 52%    COLONOSCOPY      PRE-MALIGNANT / BENIGN SKIN LESION EXCISION  5/17/12    mole on abd-left arm-Dr. Martinez Favors    TRANSTHORACIC ECHOCARDIOGRAM  07/22/2011    EF 55-65%       Social History:    Social History     Tobacco Use    Smoking status: Never     Passive exposure: Yes    Smokeless tobacco: Never   Substance Use Topics    Alcohol use:  Yes     Alcohol/week: 0.0 standard drinks     Comment: beer                                 Counseling given: Not Answered      Vital Signs (Current):   Vitals:    11/07/22 0931   BP: 135/82   Pulse: 89   Resp: 16   Temp: 97 °F (36.1 °C)   TempSrc: Temporal   SpO2: 97%   Weight: (!) 347 lb 9.6 oz (157.7 kg)   Height: 6' 4\" (1.93 m)                                              BP Readings from Last 3 Encounters:   11/07/22 135/82   11/01/22 124/78   09/26/22 120/82       NPO Status: Time of last liquid consumption: 2000                        Time of last solid consumption: 2000                        Date of last liquid consumption: 11/06/22                        Date of last solid food consumption: 11/06/22    BMI:   Wt Readings from Last 3 Encounters:   11/07/22 (!) 347 lb 9.6 oz (157.7 kg)   11/01/22 (!) 348 lb 6.4 oz (158 kg)   09/26/22 (!) 358 lb 4.8 oz (162.5 kg)     Body mass index is 42.31 kg/m². CBC:   Lab Results   Component Value Date/Time    WBC 5.8 01/28/2022 08:14 AM    RBC 5.14 01/28/2022 08:14 AM    RBC 4.76 09/14/2011 08:17 AM    HGB 14.5 01/28/2022 08:14 AM    HCT 46.4 01/28/2022 08:14 AM    MCV 90.3 01/28/2022 08:14 AM    RDW 16.4 06/10/2018 01:35 AM     01/28/2022 08:14 AM       CMP:   Lab Results   Component Value Date/Time     10/31/2022 08:06 AM    K 5.1 10/31/2022 08:06 AM    K 4.8 11/28/2020 04:24 AM     10/31/2022 08:06 AM    CO2 29 10/31/2022 08:06 AM    BUN 26 10/31/2022 08:06 AM    CREATININE 1.3 10/31/2022 08:06 AM    LABGLOM >60 10/31/2022 08:06 AM    GLUCOSE 105 10/31/2022 08:06 AM    GLUCOSE 107 05/16/2012 06:20 AM    PROT 7.7 01/28/2022 08:14 AM    CALCIUM 9.4 10/31/2022 08:06 AM    BILITOT 0.5 01/28/2022 08:14 AM    ALKPHOS 102 01/28/2022 08:14 AM    AST 28 01/28/2022 08:14 AM    ALT 41 01/28/2022 08:14 AM       POC Tests: No results for input(s): POCGLU, POCNA, POCK, POCCL, POCBUN, POCHEMO, POCHCT in the last 72 hours.     Coags:   Lab Results   Component Value Date/Time    INR 1.02 11/28/2020 04:24 AM    APTT 32.8 11/28/2020 04:24 AM       HCG (If Applicable): No results found for: PREGTESTUR, PREGSERUM, HCG, HCGQUANT     ABGs: No results found for: PHART, PO2ART, DYN8CGI, SWC6GKP, BEART, Y1JQPCTT     Type & Screen (If Applicable):  Lab Results   Component Value Date    LABRH POS 11/27/2020       Drug/Infectious Status (If Applicable):  No results found for: HIV, HEPCAB    COVID-19 Screening (If Applicable):   Lab Results   Component Value Date/Time    COVID19 NOT DETECTED 02/10/2022 08:25 AM           Anesthesia Evaluation    Airway: Mallampati: II  TM distance: >3 FB   Neck ROM: full  Mouth opening: > = 3 FB   Dental:          Pulmonary:   (+) COPD:  sleep apnea:  decreased breath sounds                            Cardiovascular:    (+) hypertension:, CAD:, CHF:,         Rhythm: regular                      Neuro/Psych:   (+) psychiatric history:            GI/Hepatic/Renal:   (+) morbid obesity          Endo/Other:                     Abdominal:   (+) obese,           Vascular: Other Findings:           Anesthesia Plan      MAC     ASA 3       Induction: intravenous. Anesthetic plan and risks discussed with patient. Plan discussed with CRNA.                     Joshua Colunga MD   11/7/2022

## 2022-11-07 NOTE — OP NOTE
Pre-Procedure Note    Patient Name: Dori Chowdhury. YOB: 1965  Room/Bed: [address] [place] [address] Pool/NONE  Medical Record Number: [id number]  Date: 11/7/22      Indication:  Lower back pain    Consent: On file. Vital Signs:   Vitals:    11/07/22 0931   BP: 135/82   Pulse: 89   Resp: 16   Temp: 97 °F (36.1 °C)   SpO2: 97%       Pre-Sedation Documentation and Exam:   Vital signs have been reviewed (see flow sheet for vitals). Sedation/ Anesthesia Plan:   MAC    Patient is an appropriate candidate for plan of sedation: yes    Preoperative Diagnosis:   L-spondylosis    Postoperative Diagnosis:   as above    Procedure Performed:  Diagnostic/Confirmatory median branch blocks at the levels of L4-5 and L5-S1 bilateral under fluoroscopic guidance #1. Indication for the Procedure: The patient has a history of chronic low back pain that is not responding well to the conservative treatment. The patient's pain is mostly axial in nature. Pain is interfering with the activities of daily living. Physical examination revealed facet tenderness and facet loading is positive. The procedure and risks  were discussed with the patient and an informed consent was obtained. Procedure:     Patient is placed in prone position and skin over  the back was prepped and draped in sterile manner. Then using fluoroscopy the junction of the transverse process of the vertebra with the superior process of the facet joint was observed and the view was optimized. The skin and deep tissues posterior were infiltrated with  20  ml of 1% lidocaine. Then a #22-gauge 5-1/2 inch spinal needle was introduced through the skin wheal under fluoroscopy guidance such that the tip of the needle lies at the junction of the transverse process L3/L4/L5 with the superior processes of the facet joint. . Then after negative aspiration a total of 12 ml of 0.25% Marcaine and depomedrol (total 80 mg) was injected through the needles.     EBL-0    For L5 Median branch block the junction of the ala of  the sacrum with the superior articular process of the facet joint was taken as a reference point and L4 median branch the junction of the transverse process the L5 with the superolateral possible facet joint was taken to the point and healthy median branch the junction of the transverse process of L4 with the superior lateral process of the facet joint was taken as a reference point. For S1 median branch the most lateral and superior aspect of S1 foramina was taken as a reference point. Patient's vital signs and neurological status remained stable through  the procedure and post procedural  Period. Patient was instructed to keep track of pain for next 24 hours. every hour and bring it with next visit. Patient tollerated the procedure well and was discharged home in stable condition.     Electronically signed by Katie Tucker MD on 11/7/22 at 10:24 AM EST

## 2022-11-07 NOTE — ANESTHESIA POSTPROCEDURE EVALUATION
Department of Anesthesiology  Postprocedure Note    Patient: Dennis Sol MRN: [id number]  YOB: 1965  Date of evaluation: 11/7/2022      Procedure Summary     Date: 11/07/22 Room / Location: [address] [place] [address] 03 / [address] [facility]    Anesthesia Start: 4095 Anesthesia Stop: 3230    Procedure: Lumbar Facet Medial Branch Block Lumbar 4-5,5-Sacral 1 bilateral (Bilateral: Back) Diagnosis:       Spondylosis of lumbar region without myelopathy or radiculopathy      (Spondylosis of lumbar region without myelopathy or radiculopathy [B96.863])    Surgeons: Trina Akhtar MD Responsible Provider: Hoda Munoz MD    Anesthesia Type: MAC ASA Status: 3          Anesthesia Type: No value filed.     Driss Phase I:      Driss Phase II: Driss Score: 10      Anesthesia Post Evaluation    Patient location during evaluation: bedside  Patient participation: complete - patient participated  Level of consciousness: awake  Airway patency: patent  Nausea & Vomiting: no vomiting and no nausea  Complications: no  Cardiovascular status: hemodynamically stable  Respiratory status: acceptable  Hydration status: stable

## 2022-11-07 NOTE — H&P
H&P      Patient with c/o low back pain. He has had some falls in the past  denies nay other injures that could have  inured his back SI or hips. [place] on ice  Feb 2022 but has  had low back pain for many years. He has worked mostly [address] work. He is on disability for mental health,  heart issues and chronic pain. He has went [facility] [place] [facility] in past  2013- 2017  He states they tried 1 injection in  the past and the needle bent   He c/o low back pain  mostly all axial facet mediated, no radicular s/s into BLE. He has back stiffness with spasms. He has limited ROM  with bending turning  increases the pain. He has LLE weakness at times with long term standing walking. He has had hip  and knee issues in the past also and followed [facility]. He had bursa hip injections  and knee steroid injections at [facility] with  mild to moderate relief. Patient pain increases with bending, lifting, twisting , turning torso, walking, standing, stairs, getting up and down, and housework or working at job. Treatments tried PT/HEP, ICE/HEAT, Chiropractor, NSAIDS, narcotics, muscle relaxer, and OTC rubs creams patches Lyrica 2012  Pain description sharp and aching  Pain rating  scale 1-10 highest  7  lowest  2  average   4  Alleviating Factors: rest Tramadol heat pad    Any leg weakness, saddle paresthesia, bowel or bladder incontinence yes or no? LLE weakness with long term standing. walking      Any prior spine or ortho surgeon consult and with whom Yes Dr Pinzon Needs many years ago , he recommended back surgery pt declined         Radiology:     The patient is allergic to losartan potassium and mobic [meloxicam]. Subjective:      Review of Systems   Constitutional:  Positive for activity change. Negative for appetite change, chills, diaphoresis, fatigue, fever and unexpected weight change. HENT:  Negative for congestion, ear pain, hearing loss, mouth sores, nosebleeds, rhinorrhea, sinus pressure and sore throat. Eyes:  Negative for photophobia, pain and visual disturbance. Respiratory:  Negative for cough, chest tightness, shortness of breath and wheezing. KRISTEN   Cardiovascular:  Negative for chest pain and palpitations. HTN CHF MI 2008   Gastrointestinal:  Negative for abdominal pain, constipation, diarrhea, nausea and vomiting. Endocrine: Negative for cold intolerance, heat intolerance, polydipsia, polyphagia and polyuria. DM   Genitourinary:  Negative for decreased urine volume, difficulty urinating, frequency and hematuria. Musculoskeletal:  Positive for arthralgias, back pain, gait problem, joint swelling and myalgias. Negative for neck pain and neck stiffness. GOUT DISH   Skin:  Negative for color change and rash. Allergic/Immunologic: Negative for food allergies and immunocompromised state. Neurological:  Negative for dizziness, tremors, seizures, syncope, facial asymmetry, speech difficulty, weakness, light-headedness, numbness and headaches. Hematological:  Does not bruise/bleed easily. Psychiatric/Behavioral:  Negative for agitation, behavioral problems, confusion, decreased concentration, dysphoric mood, hallucinations, self-injury, sleep disturbance and suicidal ideas. The patient is nervous/anxious. The patient is not hyperactive. Anxiety depression      Objective:      Vitals       Vitals:     09/26/22 0937   BP: 120/82   Weight: (!) 358 lb 4.8 oz (162.5 kg)   Height: 6' 4\" (1.93 m)            Physical Exam  Vitals and nursing note reviewed. Constitutional:       General: He is not in acute distress. Appearance: He is well-developed. He is not diaphoretic. HENT:      Head: Normocephalic and atraumatic. Right Ear: External ear normal.      Left Ear: External ear normal.      Nose: Nose normal.      Mouth/Throat:      Pharynx: No oropharyngeal exudate. Eyes:      General: No scleral icterus. Right eye: No discharge.          Left eye: No discharge. Conjunctiva/sclera: Conjunctivae normal.      Pupils: Pupils are equal, round, and reactive to light. Neck:      Thyroid: No thyromegaly. Cardiovascular:      Rate and Rhythm: Normal rate and regular rhythm. Heart sounds: Normal heart sounds. No murmur heard. No friction rub. No gallop. Pulmonary:      Effort: Pulmonary effort is normal. No respiratory distress. Breath sounds: Normal breath sounds. No wheezing or rales. Chest:      Chest wall: No tenderness. Abdominal:      General: Bowel sounds are normal. There is no distension. Palpations: Abdomen is soft. Tenderness: There is no abdominal tenderness. There is no guarding or rebound. Musculoskeletal:      Cervical back: Full passive range of motion without pain, normal range of motion and neck supple. No edema, erythema or rigidity. No muscular tenderness. Normal range of motion. Thoracic back: Spasms, tenderness and bony tenderness present. Lumbar back: Spasms, tenderness and bony tenderness present. Decreased range of motion. Back:     Right hip: Bony tenderness present. Left hip: Bony tenderness present. Right knee: Bony tenderness present. Left knee: Bony tenderness present. Skin:     General: Skin is warm. Coloration: Skin is not pale. Findings: No erythema or rash. Neurological:      Mental Status: He is alert and oriented to person, place, and time. He is not disoriented. Cranial Nerves: No cranial nerve deficit. Sensory: No sensory deficit. Motor: Weakness present. No atrophy or abnormal muscle tone. Coordination: Coordination normal.      Gait: Gait abnormal.      Deep Tendon Reflexes: Reflexes are normal and symmetric. Babinski sign absent on the right side. Reflex Scores:       Tricep reflexes are 2+ on the right side and 2+ on the left side. Bicep reflexes are 2+ on the right side and 2+ on the left side. Brachioradialis reflexes are 2+ on the right side and 2+ on the left side. Patellar reflexes are 2+ on the right side and 2+ on the left side. Achilles reflexes are 2+ on the right side and 2+ on the left side. Comments: LLE is shorter than right    Motor 5/5 BUE BLE    Psychiatric:         Attention and Perception: He is attentive. Mood and Affect: Mood is not anxious or depressed. Affect is not labile, blunt, angry or inappropriate. Speech: He is communicative. Speech is not rapid and pressured, delayed, slurred or tangential.         Behavior: Behavior is not agitated, slowed, aggressive, withdrawn, hyperactive or combative. Thought Content: Thought content is not paranoid or delusional. Thought content does not include homicidal or suicidal ideation. Thought content does not include homicidal or suicidal plan. Cognition and Memory: Memory is not impaired. He does not exhibit impaired recent memory or impaired remote memory. Judgment: Judgment is not impulsive or inappropriate. BECCA  Patricks test  positive  Yeoman's or Gaenslen's  positive  Kemps  positive           Assessment:      1. Spondylosis of lumbar region without myelopathy or radiculopathy    2. DISH (diffuse idiopathic skeletal hyperostosis)    3. Other chronic back pain    4. Chronic pain syndrome    5. Spinal stenosis of lumbar region without neurogenic claudication    6. SI (sacroiliac) joint inflammation (HCC)    7. Inflammatory arthritis    8. Acute idiopathic gout of right foot       Plan:      Patient read and signed orientation and opioid agreement if prescribing  OARRS reviewed. Current MED: 15  Patient was not offered naloxone for home. Discussed long term side effects of medications, tolerance, dependency and addiction. UDS possibly preformed today if needed  Patient told can not receive any pain medications from any other source.   No evidence of abuse, diversion or aberrant behavior. Medications and/or procedures to improve function and quality of life- patient understanding with this and that may not be pain free  Discussed possible weaning of medication dosing dependent on treatment/procedure results. Discussed with patient about safe storage of medications at home  Testing, Labs or Radiology Reviewed: Lumbar XRs   Labs:  CRP EDGARDO RH Sed Rate  Procedures: Lumbar Facet MBB#1 @ L4-5,5-S1 bilateral  Discussed with patient about risks with procedure including infection, reaction to medication, increased pain, or bleeding. Medications:will continue Tramadol  PRN q 8 hrs   If patient is on blood thinners will need approval to hold: yes or no: ASA  per Dr Violette Carrera  and takes Motrin   Does patient have implanted device Stimulator, AICD or Pacemaker etc? N/A  Discussed DISH, weight loss, PT NSAIDS  Tylenol HEP and possible Rheumatology referral                  Return for Lumbar Facet MBB @ L4-5,5-S1 bilateral#1.

## 2022-11-07 NOTE — PROGRESS NOTES
Discharge instructions provided to the patient at this time. Patient voiced understanding at this time.  Call light given to the patient

## 2022-11-07 NOTE — PROGRESS NOTES
1038 Patient arrived to phase II via cart. Spontaneous respiraitons even and unlabored. Placed on monitor--VSS. Report received from [facility] 5629 Assessment completed. Patient is alert and oriented x4. IV capped off-- no complications. Patient denies pain--will monitor. Injection sites clean and dry. 1042 Pt. Denies needs for snack and drink. Side rails up X2. Call light left within reach. 1050 Pt called out stating he's ready to go home  1052 INT removed. No complications noted. 1054 Pt. Standing at bedside with stand by assist of RN. Pt. Denies weakness or dizziness. 1055 Pt. Getting self dressed. Family notified of discharge. 1058 Pt. Ambulated to private vehicle in stable condition with RN at his side.

## 2022-11-10 ENCOUNTER — TELEPHONE (OUTPATIENT)
Dept: PHYSICAL MEDICINE AND REHAB | Age: 57
End: 2022-11-10

## 2022-11-10 ENCOUNTER — HOSPITAL ENCOUNTER (EMERGENCY)
Age: 57
Discharge: HOME | End: 2022-11-10
Payer: MEDICARE

## 2022-11-10 ENCOUNTER — APPOINTMENT (OUTPATIENT)
Dept: GENERAL RADIOLOGY | Age: 57
End: 2022-11-10
Payer: MEDICARE

## 2022-11-10 VITALS
DIASTOLIC BLOOD PRESSURE: 98 MMHG | HEIGHT: 76 IN | SYSTOLIC BLOOD PRESSURE: 157 MMHG | BODY MASS INDEX: 38.36 KG/M2 | HEART RATE: 85 BPM | TEMPERATURE: 98.6 F | RESPIRATION RATE: 18 BRPM | WEIGHT: 315 LBS | OXYGEN SATURATION: 95 %

## 2022-11-10 DIAGNOSIS — S29.011A MUSCLE STRAIN OF CHEST WALL, INITIAL ENCOUNTER: Primary | ICD-10-CM

## 2022-11-10 LAB
EKG ATRIAL RATE: 85 BPM
EKG P AXIS: 34 DEGREES
EKG P-R INTERVAL: 174 MS
EKG Q-T INTERVAL: 366 MS
EKG QRS DURATION: 116 MS
EKG QTC CALCULATION (BAZETT): 435 MS
EKG R AXIS: -38 DEGREES
EKG T AXIS: 28 DEGREES
EKG VENTRICULAR RATE: 85 BPM

## 2022-11-10 PROCEDURE — 93010 ELECTROCARDIOGRAM REPORT: CPT | Performed by: NUCLEAR MEDICINE

## 2022-11-10 PROCEDURE — 6360000002 HC RX W HCPCS: Performed by: NURSE PRACTITIONER

## 2022-11-10 PROCEDURE — 93005 ELECTROCARDIOGRAM TRACING: CPT | Performed by: NURSE PRACTITIONER

## 2022-11-10 PROCEDURE — 71101 X-RAY EXAM UNILAT RIBS/CHEST: CPT

## 2022-11-10 PROCEDURE — 99284 EMERGENCY DEPT VISIT MOD MDM: CPT

## 2022-11-10 PROCEDURE — 96372 THER/PROPH/DIAG INJ SC/IM: CPT

## 2022-11-10 RX ORDER — TIZANIDINE 4 MG/1
4 TABLET ORAL EVERY 6 HOURS PRN
Qty: 20 TABLET | Refills: 0 | Status: SHIPPED | OUTPATIENT
Start: 2022-11-10 | End: 2022-11-30 | Stop reason: SDUPTHER

## 2022-11-10 RX ORDER — NAPROXEN 500 MG/1
500 TABLET ORAL 2 TIMES DAILY WITH MEALS
Qty: 30 TABLET | Refills: 0 | Status: SHIPPED | OUTPATIENT
Start: 2022-11-10 | End: 2022-11-30 | Stop reason: SDUPTHER

## 2022-11-10 RX ORDER — ORPHENADRINE CITRATE 30 MG/ML
60 INJECTION INTRAMUSCULAR; INTRAVENOUS ONCE
Status: COMPLETED | OUTPATIENT
Start: 2022-11-10 | End: 2022-11-10

## 2022-11-10 RX ORDER — KETOROLAC TROMETHAMINE 30 MG/ML
30 INJECTION, SOLUTION INTRAMUSCULAR; INTRAVENOUS ONCE
Status: COMPLETED | OUTPATIENT
Start: 2022-11-10 | End: 2022-11-10

## 2022-11-10 RX ORDER — KETOROLAC TROMETHAMINE 30 MG/ML
30 INJECTION, SOLUTION INTRAMUSCULAR; INTRAVENOUS ONCE
Status: DISCONTINUED | OUTPATIENT
Start: 2022-11-10 | End: 2022-11-10

## 2022-11-10 RX ADMIN — KETOROLAC TROMETHAMINE 30 MG: 30 INJECTION, SOLUTION INTRAMUSCULAR at 09:29

## 2022-11-10 RX ADMIN — ORPHENADRINE CITRATE 60 MG: 30 INJECTION INTRAMUSCULAR; INTRAVENOUS at 09:29

## 2022-11-10 ASSESSMENT — ENCOUNTER SYMPTOMS
VOMITING: 0
COLOR CHANGE: 0
ABDOMINAL DISTENTION: 0
SHORTNESS OF BREATH: 1
DIARRHEA: 0
NAUSEA: 0
CHEST TIGHTNESS: 1
ABDOMINAL PAIN: 0

## 2022-11-10 ASSESSMENT — PAIN - FUNCTIONAL ASSESSMENT: PAIN_FUNCTIONAL_ASSESSMENT: 0-10

## 2022-11-10 ASSESSMENT — PAIN SCALES - GENERAL: PAINLEVEL_OUTOF10: 10

## 2022-11-10 ASSESSMENT — PAIN DESCRIPTION - LOCATION: LOCATION: BACK;CHEST

## 2022-11-10 NOTE — ED PROVIDER NOTES
[facility] Emergency Department    CHIEF COMPLAINT       Chief Complaint   Patient presents with    Fall       Nurses Notes reviewed and I agree except as noted in the HPI. HISTORY OF PRESENT ILLNESS    Darwin Colbert. Is a 62 y.o. male who presents to the ED for evaluation of fall. Patient notes he fell on Monday. Believes he tripped over a home, he landed on his right side he braced with his right arm. He denies any injury to the arm. He notes over the last 2 days he has developed right-sided chest pain, right shoulder pain. This morning when he woke up he had significant stiffness and difficulty using his right arm. He denies any neck pain. He denies hitting his head. He notes some shortness of breath associated with the chest pain. He notes a history of congestive heart failure, and coronary artery disease, denies a history of any stents. He notes past medical history of hypertension. He denies taking any medications for his symptoms at this time. HPI was provided by the patient. REVIEW OF SYSTEMS     Review of Systems   Constitutional:  Negative for activity change, chills and fatigue. Respiratory:  Positive for chest tightness and shortness of breath. Cardiovascular:  Positive for chest pain. Negative for leg swelling. Gastrointestinal:  Negative for abdominal distention, abdominal pain, diarrhea, nausea and vomiting. Musculoskeletal:  Positive for arthralgias and myalgias. Negative for gait problem, joint swelling, neck pain and neck stiffness. Skin:  Negative for color change and wound. Allergic/Immunologic: Negative for immunocompromised state. Neurological:  Negative for dizziness, weakness, light-headedness and numbness. Hematological:  Does not bruise/bleed easily. Psychiatric/Behavioral:  Negative for agitation and confusion.        PAST MEDICAL HISTORY     Past Medical History:   Diagnosis Date    Anxiety     Arrhythmia     Arthritis     CAD (coronary artery disease)     Chest pain     HX OF:    CHF (congestive heart failure) (HCC)     Chronic back pain     COPD (chronic obstructive pulmonary disease) (HCC)     Depression     Diverticula of colon     Fatigue     Heart attack (HCC)     Hyperlipidemia     Hypertension     Panic attacks     Pneumonia     Sleep apnea     SOB (shortness of breath)     Spondylosis     sponylolisthesis L5    Swelling        SURGICALHISTORY      has a past surgical history that includes Cardiac catheterization (01/30/2008); cardiovascular stress test (02/16/2009); transthoracic echocardiogram (07/22/2011); pre-malignant / benign skin lesion excision (5/17/12); Colonoscopy; and Pain management procedure (Bilateral, 11/7/2022). CURRENT MEDICATIONS       Discharge Medication List as of 11/10/2022 10:23 AM        CONTINUE these medications which have NOT CHANGED    Details   spironolactone (ALDACTONE) 25 MG tablet Take 25 mg by mouth dailyHistorical Med      raNITIdine HCl (RANITIDINE 150 MAX STRENGTH PO) Take 150 mg by mouth dailyHistorical Med      traMADol (ULTRAM) 50 MG tablet Take 1 tablet by mouth every 8 hours as needed for Pain for up to 30 days. , Disp-90 tablet, R-0Normal      dapagliflozin (FARXIGA) 10 MG tablet Take 1 tablet by mouth every morning, Disp-90 tablet, R-3Normal      bisacodyl (DULCOLAX) 5 MG EC tablet See Prep Instructions, Disp-4 tablet, R-0Normal      polyethylene glycol (GLYCOLAX) 17 GM/SCOOP powder Dispense 238 Gram Bottle.   Use as Directed, Disp-238 g, R-0Normal      albuterol sulfate HFA (VENTOLIN HFA) 108 (90 Base) MCG/ACT inhaler Inhale 2 puffs into the lungs 4 times daily as needed for Wheezing, Disp-18 g, R-0Normal      traZODone (DESYREL) 50 MG tablet Take 50 mg by mouth nightlyHistorical Med      umeclidinium-vilanterol (ANORO ELLIPTA) 62.5-25 MCG/INH AEPB inhaler Inhale 1 puff into the lungs dailyHistorical Med      triamcinolone (KENALOG) 0.1 % cream Apply topically 2 times daily Apply topically 2 times daily., Topical, 2 TIMES DAILY, Historical Med      CPAP Machine MISC Disp-1 each, R-0, PrintPlease provide a new CPAP machine with pressure of 10. The new CPAP machine must have capabilities to give down load report regarding >4hour compliance and residual AHI. PAP must have auto capability and detailed download capamie ity      lisinopril (PRINIVIL;ZESTRIL) 10 MG tablet TAKE 1 TABLET BY MOUTH ONCE DAILYHistorical Med      Blood Pressure Monitoring (BLOOD PRESSURE MONITOR AUTOMAT) GENET 1 applicator by Does not apply route daily, Disp-1 each, R-0Print      vitamin C (VITAMIN C) 500 MG tablet Take 1 tablet by mouth daily, Disp-30 tablet,R-3Normal      dicyclomine (BENTYL) 10 MG capsule Take 1 capsule by mouth every 6 hours as needed (cramps), Disp-20 capsule, R-0Print      ondansetron (ZOFRAN ODT) 4 MG disintegrating tablet Take 1 tablet by mouth every 8 hours as needed for Nausea, Disp-20 tablet, R-0Print      acetaminophen (APAP EXTRA STRENGTH) 500 MG tablet Take 1 tablet by mouth every 6 hours as needed for Pain, Disp-120 tablet, R-0Print      metFORMIN (GLUCOPHAGE-XR) 500 MG extended release tablet Take 500 mg by mouth daily (with breakfast)Historical Med      allopurinol (ZYLOPRIM) 300 MG tablet Take 1 tablet by mouth daily, Disp-30 tablet, R-5      atorvastatin (LIPITOR) 20 MG tablet TAKE 1 TABLET BY MOUTH ONE TIME A DAY, Disp-30 tablet, R-5      bumetanide (BUMEX) 0.5 MG tablet Take 2 tablets daily. If weight gain 3Ibs in 1 day or 5Ibs in 1 week, take 2 tablets in AM and PM as needed. , Disp-60 tablet, R-5      carvedilol (COREG) 25 MG tablet Take 1 tablet by mouth 2 times daily, Disp-60 tablet, R-5      meclizine (ANTIVERT) 25 MG tablet Take 1 tablet by mouth 3 times daily as needed, Disp-60 tablet, R-5      carBAMazepine (TEGRETOL) 200 MG tablet Take 200 mg by mouth 2 times daily Historical Med      Blood Pressure KIT Disp-1 kit, R-0, PrintThis is a prescription for a blood pressure cuff for at home use. risperiDONE (RISPERDAL) 2 MG tablet Take 0.5 mg by mouth 2 times daily Historical Med      citalopram (CELEXA) 20 MG tablet Take 1 tablet by mouth daily. , Disp-30 tablet, R-5      aspirin EC 81 MG EC tablet Take 81 mg by mouth daily. With food; blood thinner             ALLERGIES     is allergic to losartan potassium and mobic [meloxicam]. FAMILY HISTORY     He indicated that his mother is alive. He indicated that his father is . He indicated that his sister is alive. He indicated that his maternal grandmother is . He indicated that the status of his maternal grandfather is unknown. He indicated that the status of his paternal grandfather is unknown. He indicated that the status of his paternal uncle is unknown.   family history includes Cancer in his maternal grandmother; Colon Cancer in his maternal grandfather, maternal grandmother, paternal grandfather, and paternal uncle; Colon Cancer (age of onset: 52) in his sister; Colon Cancer (age of onset: 68) in his father; Coronary Art Dis in his father; High Blood Pressure in his father; High Cholesterol in his father. SOCIAL HISTORY       Social History     Socioeconomic History    Marital status: Single     Spouse name: Not on file    Number of children: 4    Years of education: 12    Highest education level: Not on file   Occupational History    Occupation: unemployed   Tobacco Use    Smoking status: Never     Passive exposure: Yes    Smokeless tobacco: Never   Vaping Use    Vaping Use: Never used   Substance and Sexual Activity    Alcohol use:  Yes     Alcohol/week: 0.0 standard drinks     Comment: beer     Drug use: No    Sexual activity: Yes   Other Topics Concern    Not on file   Social History Narrative    Not on file     Social Determinants of Health     Financial Resource Strain: Not on file   Food Insecurity: Not on file   Transportation Needs: Not on file   Physical Activity: Not on file   Stress: Not on file   Social Connections: Not on file   Intimate Partner Violence: Not on file   Housing Stability: Not on file       PHYSICAL EXAM     INITIAL VITALS:  height is 6' 4\" (1.93 m) and weight is 348 lb (157.9 kg) (abnormal). His oral temperature is 98.6 °F (37 °C). His blood pressure is 157/98 (abnormal) and his pulse is 85. His respiration is 18 and oxygen saturation is 95%. Physical Exam  Vitals and nursing note reviewed. Constitutional:       Appearance: Normal appearance. He is well-developed. He is obese. HENT:      Head: Normocephalic. Right Ear: External ear normal.      Left Ear: External ear normal.      Nose: Nose normal.      Mouth/Throat:      Pharynx: Uvula midline. Eyes:      Conjunctiva/sclera: Conjunctivae normal.   Cardiovascular:      Rate and Rhythm: Normal rate and regular rhythm. Pulses: Normal pulses. Heart sounds: Normal heart sounds, S1 normal and S2 normal.   Pulmonary:      Effort: Pulmonary effort is normal. No respiratory distress. Breath sounds: Normal breath sounds. Chest:      Chest wall: No tenderness. Abdominal:      General: Bowel sounds are normal. There is no distension. Palpations: Abdomen is soft. Tenderness: There is no abdominal tenderness. Musculoskeletal:         General: Normal range of motion. Right shoulder: No swelling, deformity, tenderness or bony tenderness. Normal range of motion. Cervical back: Normal, normal range of motion and neck supple. Skin:     General: Skin is warm and dry. Coloration: Skin is not pale. Findings: No erythema or rash. Neurological:      Mental Status: He is alert and oriented to person, place, and time. Psychiatric:         Behavior: Behavior normal.         Thought Content:  Thought content normal.         Judgment: Judgment normal.       DIFFERENTIAL DIAGNOSIS:   Strain sprain contusion, rib fracture  DIAGNOSTIC RESULTS       RADIOLOGY: non-plainfilm images(s) such as CT, Ultrasound and MRI are read by the radiologist.  Plain radiographic images are visualized and preliminarily interpreted by the emergency physician unless otherwise stated below. XR RIBS RIGHT INCLUDE CHEST (MIN 3 VIEWS)   Final Result   No fracture. **This report has been created using voice recognition software. It may contain minor errors which are inherent in voice recognition technology. **      Final report electronically signed by Dr Rahul Waters on 11/10/2022 9:50 AM            LABS:   Labs Reviewed - No data to display    EMERGENCY DEPARTMENT COURSE:   Vitals:    Vitals:    11/10/22 0844 11/10/22 0846   BP:  (!) 157/98   Pulse:  85   Resp:  18   Temp:  98.6 °F (37 °C)   TempSrc:  Oral   SpO2:  95%   Weight: (!) 348 lb (157.9 kg)    Height: 6' 4\" (1.93 m)          MDM      Patient was seen and evaluated in the emergency department, patient appeared to be in no acute distress, vital signs reviewed, hypertension noted. Physical exam completed, no significant rib tenderness noted on exam, no decreased range of motion of the right shoulder, no decrease strength, no decrease sensation noted. X-rays of the chest are obtained, patient is treated with Norflex and Toradol. X-rays revealed no fracture. Patient noted improvement in symptoms. I discussed my findings and plan of care with the patient is amenable with discharge. He is advised to follow-up with his family doctor if symptoms fail to improve. Return to ER with worsening symptoms. He verbalized understanding of plan of care. Medications   orphenadrine (NORFLEX) injection 60 mg (60 mg IntraMUSCular Given 11/10/22 0929)   ketorolac (TORADOL) injection 30 mg (30 mg IntraMUSCular Given 11/10/22 0929)       Patient was seenindependently by myself. The patient's final impression and disposition and plan was determined by myself. CRITICAL CARE:   None    CONSULTS:  None    PROCEDURES:  None    FINAL IMPRESSION     1.  Muscle strain of chest wall, initial encounter DISPOSITION/PLAN    Patient discharged    PATIENT REFERREDTO:  Galdino Vieira MD  [address] [facility]  [address]  [phone]    Call   For follow up and evaluation, If symptoms worsen    DISCHARGE MEDICATIONS:  Discharge Medication List as of 11/10/2022 10:23 AM        START taking these medications    Details   naproxen (NAPROSYN) 500 MG tablet Take 1 tablet by mouth 2 times daily (with meals) for 30 doses, Disp-30 tablet, R-0Normal      tiZANidine (ZANAFLEX) 4 MG tablet Take 1 tablet by mouth every 6 hours as needed (muscle spasm), Disp-20 tablet, R-0Normal             (Please note that portions of this note were completed with a voice recognition program.  Efforts were made to edit the dictations but occasionally words are mis-transcribed.)        Provider:  I personally performed the services described in the documentation,reviewed and edited the documentation which was dictated to the scribe in my presence, and it accurately records my words and actions.     Oscar Jha CNP 11/10/22 10:54 AM    MARCO Serrano - TROY         [employer], APRN - CNP  11/10/22 2029

## 2022-11-10 NOTE — ED TRIAGE NOTES
Presents to ED with c/o fall on Monday. States he tripped and fell onto his back. C/o back and chest pain. Alert and oriented. Respirations easy and unlabored.

## 2022-11-22 ENCOUNTER — TELEPHONE (OUTPATIENT)
Dept: PHYSICAL MEDICINE AND REHAB | Age: 57
End: 2022-11-22

## 2022-11-22 ENCOUNTER — OFFICE VISIT (OUTPATIENT)
Dept: PHYSICAL MEDICINE AND REHAB | Age: 57
End: 2022-11-22
Payer: MEDICARE

## 2022-11-22 VITALS
BODY MASS INDEX: 38.36 KG/M2 | WEIGHT: 315 LBS | HEART RATE: 70 BPM | SYSTOLIC BLOOD PRESSURE: 134 MMHG | DIASTOLIC BLOOD PRESSURE: 84 MMHG | HEIGHT: 76 IN

## 2022-11-22 DIAGNOSIS — M46.1 SI (SACROILIAC) JOINT INFLAMMATION (HCC): ICD-10-CM

## 2022-11-22 DIAGNOSIS — G89.4 CHRONIC PAIN SYNDROME: ICD-10-CM

## 2022-11-22 DIAGNOSIS — M19.90 INFLAMMATORY ARTHRITIS: ICD-10-CM

## 2022-11-22 DIAGNOSIS — M48.061 SPINAL STENOSIS OF LUMBAR REGION WITHOUT NEUROGENIC CLAUDICATION: ICD-10-CM

## 2022-11-22 DIAGNOSIS — M47.816 SPONDYLOSIS OF LUMBAR REGION WITHOUT MYELOPATHY OR RADICULOPATHY: Primary | ICD-10-CM

## 2022-11-22 DIAGNOSIS — M10.071 ACUTE IDIOPATHIC GOUT OF RIGHT FOOT: ICD-10-CM

## 2022-11-22 DIAGNOSIS — M48.10 DISH (DIFFUSE IDIOPATHIC SKELETAL HYPEROSTOSIS): ICD-10-CM

## 2022-11-22 PROCEDURE — 3078F DIAST BP <80 MM HG: CPT | Performed by: NURSE PRACTITIONER

## 2022-11-22 PROCEDURE — 99214 OFFICE O/P EST MOD 30 MIN: CPT | Performed by: NURSE PRACTITIONER

## 2022-11-22 PROCEDURE — 3074F SYST BP LT 130 MM HG: CPT | Performed by: NURSE PRACTITIONER

## 2022-11-22 ASSESSMENT — ENCOUNTER SYMPTOMS
SORE THROAT: 0
CONSTIPATION: 0
BACK PAIN: 1
PHOTOPHOBIA: 0
SINUS PRESSURE: 0
RHINORRHEA: 0
VOMITING: 0
CHEST TIGHTNESS: 0
ABDOMINAL PAIN: 0
COLOR CHANGE: 0
EYE PAIN: 0
NAUSEA: 0
SHORTNESS OF BREATH: 0
WHEEZING: 0
COUGH: 0
DIARRHEA: 0

## 2022-11-22 NOTE — PROGRESS NOTES
[facility] [address]  Dept: [phone]  Dept Fax: [phone]: [phone]    Visit Date: 11/22/2022    Functionality Assessment/Goals Worksheet     On a scale of 0 (Does not Interfere) to 10 (Completely Interferes)     1. Which number describes how during the past week pain has interfered with       the following:  A. General Activity:  8  B. Mood: 8  C. Walking Ability:  8  D. Normal Work (Includes both work outside the home and housework):  8  E. Relations with Other People:   7  F. Sleep:   9  G. Enjoyment of Life:   9    2. Patient Prefers to Take their Pain Medications:     [x]  On a regular basis   []  Only when necessary    []  Does not take pain medications    3. What are the Patient's Goals/Expectations for Visiting Pain Management? []  Learn about my pain    [x]  Receive Medication   []  Physical Therapy     [x]  Treat Depression   [x]  Receive Injections    []  Treat Sleep   []  Deal with Anxiety and Stress   []  Treat Opoid Dependence/Addiction   [x]  Other:Fell and hurt. Had gone to ED after fall      HPI:   Pinky Monday. is a 62 y.o. male is here today for    Chief Complaint: Lumbar back pain      F/U Lumbar MBB L4-5,5-S1 bilateral #1 on 11/7/2022 states he received about 80% pain relief or benefit for 1 week then he fell and the pain came back and was worse for a bit. He is back to baseline now. He follows Dr Muriel Severance. I referred for elevated screen labs. He complains of any ER visits or new health issues since last visit. muscle strain  chest wall from a fall     HPI  New pt here with c/o low back pain. He has had some falls in the past  denies nay other injures that could have  inured his back SI or hips. Guero Hemp on ice  Feb 2022 but has  had low back pain for many years. He has worked mostly [address] work.  He is on disability for mental health,  heart issues and chronic pain. He has went [facility] and [place] [facility] clinic in past  2013- 2017  He states they tried 1 injection in  the past and the needle bent   He c/o low back pain  mostly all axial facet mediated, no radicular s/s into BLE. He has back stiffness with spasms. He has limited ROM  with bending turning  increases the pain. He has LLE weakness at times with long term standing walking. He has had hip  and knee issues in the past also and followed [facility]. He had bursa hip injections  and knee steroid injections at [facility] with  mild to moderate relief. Patient pain increases with bending, lifting, twisting , turning torso, walking, standing, stairs, getting up and down, and housework or working at job. Treatments tried PT/HEP, ICE/HEAT, Chiropractor, NSAIDS, narcotics, muscle relaxer, and OTC rubs creams patches Lyrica 2012  Pain description sharp and aching  Pain rating  scale 1-10 highest  7  lowest  2  average   4  Alleviating Factors: rest Tramadol heat pad    Any leg weakness, saddle paresthesia, bowel or bladder incontinence yes or no? LLE weakness with long term standing. walking      Any prior spine or ortho surgeon consult and with whom Yes Dr Suzy Haq many years ago , he recommended back surgery pt declined         Radiology:             Pain scale with out pain medications or at its worst is 9/10. Pain scale with pain medications or at its best is 5/10. Last dose of Tramadol  was today  Drug screen reviewed from 9/2022 and was appropriate  Pill count completed  today and WNL: Yes               The patientis allergic to losartan potassium and mobic [meloxicam]. Subjective:      Review of Systems   Constitutional:  Positive for activity change. Negative for appetite change, chills, diaphoresis, fatigue, fever and unexpected weight change. HENT:  Negative for congestion, ear pain, hearing loss, mouth sores, nosebleeds, rhinorrhea, sinus pressure and sore throat. Eyes:  Negative for photophobia, pain and visual disturbance. Respiratory:  Negative for cough, chest tightness, shortness of breath and wheezing. KRISTEN   Cardiovascular:  Negative for chest pain and palpitations. HTN CHF MI 2008   Gastrointestinal:  Negative for abdominal pain, constipation, diarrhea, nausea and vomiting. Endocrine: Negative for cold intolerance, heat intolerance, polydipsia, polyphagia and polyuria. DM   Genitourinary:  Negative for decreased urine volume, difficulty urinating, frequency and hematuria. Musculoskeletal:  Positive for arthralgias, back pain, gait problem, joint swelling and myalgias. Negative for neck pain and neck stiffness. GOUT DISH   Skin:  Negative for color change and rash. Allergic/Immunologic: Negative for food allergies and immunocompromised state. Neurological:  Negative for dizziness, tremors, seizures, syncope, facial asymmetry, speech difficulty, weakness, light-headedness, numbness and headaches. Hematological:  Does not bruise/bleed easily. Psychiatric/Behavioral:  Negative for agitation, behavioral problems, confusion, decreased concentration, dysphoric mood, hallucinations, self-injury, sleep disturbance and suicidal ideas. The patient is nervous/anxious. The patient is not hyperactive. Anxiety depression     Objective:     Vitals:    11/22/22 0821   BP: 134/84   Site: Left Upper Arm   Position: Sitting   Cuff Size: Large Adult   Pulse: 70   Weight: (!) 348 lb (157.9 kg)   Height: 6' 4\" (1.93 m)       Physical Exam  Vitals and nursing note reviewed. Constitutional:       General: He is not in acute distress. Appearance: He is well-developed. He is not diaphoretic. HENT:      Head: Normocephalic and atraumatic. Right Ear: External ear normal.      Left Ear: External ear normal.      Nose: Nose normal.      Mouth/Throat:      Pharynx: No oropharyngeal exudate. Eyes:      General: No scleral icterus. Right eye: No discharge. Left eye: No discharge. Conjunctiva/sclera: Conjunctivae normal.      Pupils: Pupils are equal, round, and reactive to light. Neck:      Thyroid: No thyromegaly. Cardiovascular:      Rate and Rhythm: Normal rate and regular rhythm. Heart sounds: Normal heart sounds. No murmur heard. No friction rub. No gallop. Pulmonary:      Effort: Pulmonary effort is normal. No respiratory distress. Breath sounds: Normal breath sounds. No wheezing or rales. Chest:      Chest wall: No tenderness. Abdominal:      General: Bowel sounds are normal. There is no distension. Palpations: Abdomen is soft. Tenderness: There is no abdominal tenderness. There is no guarding or rebound. Musculoskeletal:      Cervical back: Full passive range of motion without pain, normal range of motion and neck supple. No edema, erythema or rigidity. No muscular tenderness. Normal range of motion. Thoracic back: Spasms, tenderness and bony tenderness present. Lumbar back: Spasms, tenderness and bony tenderness present. Decreased range of motion. Back:       Right hip: Bony tenderness present. Left hip: Bony tenderness present. Right knee: Bony tenderness present. Left knee: Bony tenderness present. Skin:     General: Skin is warm. Coloration: Skin is not pale. Findings: No erythema or rash. Neurological:      Mental Status: He is alert and oriented to person, place, and time. He is not disoriented. Cranial Nerves: No cranial nerve deficit. Sensory: No sensory deficit. Motor: Weakness present. No atrophy or abnormal muscle tone. Coordination: Coordination normal.      Gait: Gait abnormal.      Deep Tendon Reflexes: Reflexes are normal and symmetric. Babinski sign absent on the right side. Reflex Scores:       Tricep reflexes are 2+ on the right side and 2+ on the left side.        Bicep reflexes are 2+ on the right side and 2+ on the left side. Brachioradialis reflexes are 2+ on the right side and 2+ on the left side. Patellar reflexes are 2+ on the right side and 2+ on the left side. Achilles reflexes are 2+ on the right side and 2+ on the left side. Comments: LLE is shorter than right    Motor 5/5 BUE BLE    Psychiatric:         Attention and Perception: He is attentive. Mood and Affect: Mood is not anxious or depressed. Affect is not labile, blunt, angry or inappropriate. Speech: He is communicative. Speech is not rapid and pressured, delayed, slurred or tangential.         Behavior: Behavior is not agitated, slowed, aggressive, withdrawn, hyperactive or combative. Thought Content: Thought content is not paranoid or delusional. Thought content does not include homicidal or suicidal ideation. Thought content does not include homicidal or suicidal plan. Cognition and Memory: Memory is not impaired. He does not exhibit impaired recent memory or impaired remote memory. Judgment: Judgment is not impulsive or inappropriate. BECCA  Patricks test  positive  Yeoman's  or Gaenslen's positive  Kemps  positive       Assessment:     1. Spondylosis of lumbar region without myelopathy or radiculopathy    2. DISH (diffuse idiopathic skeletal hyperostosis)    3. Spinal stenosis of lumbar region without neurogenic claudication    4. SI (sacroiliac) joint inflammation (HCC)    5. Chronic pain syndrome    6. Inflammatory arthritis    7. Acute idiopathic gout of right foot            Plan:      OARRS reviewed. Current MED: 15  Patient was not offered naloxone for home. Testing Labs or Radiology reviewed: Lumbar   Procedures:Lumbar facet MBB #2 @ L4-5,5-S1 bilateral   Discussed with patient about risks with procedure including infection, reaction to medication, increased pain, or bleeding.   Medications:Tramadol   If patient is on blood thinners will need approval to hold

## 2022-11-29 DIAGNOSIS — G89.29 OTHER CHRONIC BACK PAIN: ICD-10-CM

## 2022-11-29 DIAGNOSIS — G89.4 CHRONIC PAIN SYNDROME: ICD-10-CM

## 2022-11-29 DIAGNOSIS — M54.9 OTHER CHRONIC BACK PAIN: ICD-10-CM

## 2022-11-29 DIAGNOSIS — M48.10 DISH (DIFFUSE IDIOPATHIC SKELETAL HYPEROSTOSIS): ICD-10-CM

## 2022-11-29 DIAGNOSIS — M19.90 INFLAMMATORY ARTHRITIS: ICD-10-CM

## 2022-11-29 DIAGNOSIS — M46.1 SI (SACROILIAC) JOINT INFLAMMATION (HCC): ICD-10-CM

## 2022-11-29 DIAGNOSIS — M48.061 SPINAL STENOSIS OF LUMBAR REGION WITHOUT NEUROGENIC CLAUDICATION: ICD-10-CM

## 2022-11-29 DIAGNOSIS — M47.816 SPONDYLOSIS OF LUMBAR REGION WITHOUT MYELOPATHY OR RADICULOPATHY: ICD-10-CM

## 2022-11-29 DIAGNOSIS — M10.071 ACUTE IDIOPATHIC GOUT OF RIGHT FOOT: ICD-10-CM

## 2022-11-29 NOTE — TELEPHONE ENCOUNTER
Corie Powers. called requesting a refill on the following medications:  Requested Prescriptions     Pending Prescriptions Disp Refills    traMADol (ULTRAM) 50 MG tablet 90 tablet 0     Sig: Take 1 tablet by mouth every 8 hours as needed for Pain for up to 30 days. Pharmacy verified: [id number] on [address] [facility]  . pv      Date of last visit: 11/22/2022  Date of next visit (if applicable): 7/9/9035

## 2022-11-29 NOTE — TELEPHONE ENCOUNTER
OARRS reviewed. UDS: + for  tramadol flexeril consistent. Last seen: 11/22/2022.  Follow-up:   Future Appointments   Date Time Provider Willam Norwoodi   2/2/2023  8:00 AM MARCO Lewis CNP Med P - [place]   2/6/2023  8:20 AM MARCO Swan CNP [facility] Pain P - [place]   4/14/2023  8:30 AM Ryann Hubbard MD [facility] Heart P - [place]   8/17/2023  9:30 AM MARCO Doshi CNP [facility] CHF P - [place]

## 2022-11-30 ENCOUNTER — HOSPITAL ENCOUNTER (EMERGENCY)
Age: 57
Discharge: HOME | End: 2022-11-30
Payer: MEDICARE

## 2022-11-30 ENCOUNTER — APPOINTMENT (OUTPATIENT)
Dept: GENERAL RADIOLOGY | Age: 57
End: 2022-11-30
Payer: MEDICARE

## 2022-11-30 ENCOUNTER — APPOINTMENT (OUTPATIENT)
Dept: CT IMAGING | Age: 57
End: 2022-11-30
Payer: MEDICARE

## 2022-11-30 VITALS
OXYGEN SATURATION: 95 % | SYSTOLIC BLOOD PRESSURE: 126 MMHG | HEART RATE: 91 BPM | TEMPERATURE: 98.5 F | HEIGHT: 76 IN | BODY MASS INDEX: 38.36 KG/M2 | RESPIRATION RATE: 18 BRPM | DIASTOLIC BLOOD PRESSURE: 93 MMHG | WEIGHT: 315 LBS

## 2022-11-30 DIAGNOSIS — M54.6 BILATERAL THORACIC BACK PAIN, UNSPECIFIED CHRONICITY: Primary | ICD-10-CM

## 2022-11-30 PROCEDURE — 99284 EMERGENCY DEPT VISIT MOD MDM: CPT | Performed by: PHYSICIAN ASSISTANT

## 2022-11-30 PROCEDURE — 6370000000 HC RX 637 (ALT 250 FOR IP): Performed by: PHYSICIAN ASSISTANT

## 2022-11-30 PROCEDURE — 71045 X-RAY EXAM CHEST 1 VIEW: CPT

## 2022-11-30 PROCEDURE — 72128 CT CHEST SPINE W/O DYE: CPT

## 2022-11-30 RX ORDER — NAPROXEN 500 MG/1
500 TABLET ORAL 2 TIMES DAILY WITH MEALS
Qty: 30 TABLET | Refills: 0 | Status: SHIPPED | OUTPATIENT
Start: 2022-11-30 | End: 2022-12-15

## 2022-11-30 RX ORDER — TIZANIDINE 4 MG/1
4 TABLET ORAL EVERY 6 HOURS PRN
Qty: 20 TABLET | Refills: 0 | Status: SHIPPED | OUTPATIENT
Start: 2022-11-30

## 2022-11-30 RX ORDER — LIDOCAINE 4 G/G
1 PATCH TOPICAL DAILY
Status: DISCONTINUED | OUTPATIENT
Start: 2022-11-30 | End: 2022-11-30 | Stop reason: HOSPADM

## 2022-11-30 RX ORDER — LIDOCAINE 50 MG/G
1 PATCH TOPICAL DAILY
Qty: 15 PATCH | Refills: 0 | Status: SHIPPED | OUTPATIENT
Start: 2022-11-30

## 2022-11-30 ASSESSMENT — PAIN DESCRIPTION - LOCATION: LOCATION: BACK;RIB CAGE

## 2022-11-30 ASSESSMENT — ENCOUNTER SYMPTOMS
SHORTNESS OF BREATH: 1
BACK PAIN: 1
COUGH: 1

## 2022-11-30 ASSESSMENT — PAIN - FUNCTIONAL ASSESSMENT: PAIN_FUNCTIONAL_ASSESSMENT: 0-10

## 2022-11-30 ASSESSMENT — PAIN SCALES - GENERAL: PAINLEVEL_OUTOF10: 10

## 2022-11-30 NOTE — ED PROVIDER NOTES
[facility]  eMERGENCY dEPARTMENT eNCOUnter          [address] [facility]       Chief Complaint   Patient presents with    Rib Pain (injury)     Fall on the 10th    Back Pain     Fall on the 10th       Nurses Notes reviewed and I agree except as noted inthe HPI. HISTORY OF PRESENT ILLNESS    Colby Becerra is a 62 y.o. male who presents to the Emergency Department for the evaluation of rib injury and back pain. Patient states that he had a fall on 11/7/2022. States that he tripped and landed on his right side. He was seen in the ED a few days later for some rib and shoulder pain and since that time has continued to have pain in the midline of the upper back without radiation into the extremities though it does radiate bilaterally around the chest to the anterior chest wall/sternum. He states it is worst in the morning right when he wakes up. Pain is so severe that he has difficulty breathing when he tries to sit up and he has recently had to roll out of bed onto his hands and knees on the floor in order to get up. Pain was gradual in onset and does seem to improve with activity. He has tried heat, ibuprofen, naproxen, Zanaflex and Ultram without relief of the pain. Reports a slight cough without fever and no numbness, tingling or weakness. The HPI was provided by the patient. REVIEW OF SYSTEMS     Review of Systems   Respiratory:  Positive for cough and shortness of breath. Musculoskeletal:  Positive for back pain. All other systems reviewed and are negative.     PAST MEDICAL HISTORY    has a past medical history of Anxiety, Arrhythmia, Arthritis, CAD (coronary artery disease), Chest pain, CHF (congestive heart failure) ([address] Chronic back pain, COPD (chronic obstructive pulmonary disease) ([address] Depression, Diverticula of colon, Fatigue, Heart attack ([address] Hyperlipidemia, Hypertension, Panic attacks, Pneumonia, Sleep apnea, SOB (shortness of breath), Spondylosis, and Swelling. SURGICAL HISTORY      has a past surgical history that includes Cardiac catheterization (01/30/2008); cardiovascular stress test (02/16/2009); transthoracic echocardiogram (07/22/2011); pre-malignant / benign skin lesion excision (5/17/12); Colonoscopy; and Pain management procedure (Bilateral, 11/7/2022). CURRENT MEDICATIONS       Discharge Medication List as of 11/30/2022  5:00 PM        CONTINUE these medications which have NOT CHANGED    Details   spironolactone (ALDACTONE) 25 MG tablet Take 25 mg by mouth dailyHistorical Med      raNITIdine HCl (RANITIDINE 150 MAX STRENGTH PO) Take 150 mg by mouth dailyHistorical Med      traMADol (ULTRAM) 50 MG tablet Take 1 tablet by mouth every 8 hours as needed for Pain for up to 30 days. , Disp-90 tablet, R-0Normal      dapagliflozin (FARXIGA) 10 MG tablet Take 1 tablet by mouth every morning, Disp-90 tablet, R-3Normal      bisacodyl (DULCOLAX) 5 MG EC tablet See Prep Instructions, Disp-4 tablet, R-0Normal      polyethylene glycol (GLYCOLAX) 17 GM/SCOOP powder Dispense 238 Gram Bottle. Use as Directed, Disp-238 g, R-0Normal      albuterol sulfate HFA (VENTOLIN HFA) 108 (90 Base) MCG/ACT inhaler Inhale 2 puffs into the lungs 4 times daily as needed for Wheezing, Disp-18 g, R-0Normal      traZODone (DESYREL) 50 MG tablet Take 50 mg by mouth nightlyHistorical Med      umeclidinium-vilanterol (ANORO ELLIPTA) 62.5-25 MCG/INH AEPB inhaler Inhale 1 puff into the lungs dailyHistorical Med      triamcinolone (KENALOG) 0.1 % cream Apply topically 2 times daily Apply topically 2 times daily. , Topical, 2 TIMES DAILY, Historical Med      CPAP Machine MISC Disp-1 each, R-0, PrintPlease provide a new CPAP machine with pressure of 10. The new CPAP machine must have capabilities to give down load report regarding >4hour compliance and residual AHI.   PAP must have auto capability and detailed download capabil ity      lisinopril (PRINIVIL;ZESTRIL) 10 MG tablet TAKE 1 TABLET BY MOUTH ONCE DAILYHistorical Med      Blood Pressure Monitoring (BLOOD PRESSURE MONITOR AUTOMAT) GENET 1 applicator by Does not apply route daily, Disp-1 each, R-0Print      vitamin C (VITAMIN C) 500 MG tablet Take 1 tablet by mouth daily, Disp-30 tablet,R-3Normal      dicyclomine (BENTYL) 10 MG capsule Take 1 capsule by mouth every 6 hours as needed (cramps), Disp-20 capsule, R-0Print      ondansetron (ZOFRAN ODT) 4 MG disintegrating tablet Take 1 tablet by mouth every 8 hours as needed for Nausea, Disp-20 tablet, R-0Print      acetaminophen (APAP EXTRA STRENGTH) 500 MG tablet Take 1 tablet by mouth every 6 hours as needed for Pain, Disp-120 tablet, R-0Print      metFORMIN (GLUCOPHAGE-XR) 500 MG extended release tablet Take 500 mg by mouth daily (with breakfast)Historical Med      allopurinol (ZYLOPRIM) 300 MG tablet Take 1 tablet by mouth daily, Disp-30 tablet, R-5      atorvastatin (LIPITOR) 20 MG tablet TAKE 1 TABLET BY MOUTH ONE TIME A DAY, Disp-30 tablet, R-5      bumetanide (BUMEX) 0.5 MG tablet Take 2 tablets daily. If weight gain 3Ibs in 1 day or 5Ibs in 1 week, take 2 tablets in AM and PM as needed. , Disp-60 tablet, R-5      carvedilol (COREG) 25 MG tablet Take 1 tablet by mouth 2 times daily, Disp-60 tablet, R-5      meclizine (ANTIVERT) 25 MG tablet Take 1 tablet by mouth 3 times daily as needed, Disp-60 tablet, R-5      carBAMazepine (TEGRETOL) 200 MG tablet Take 200 mg by mouth 2 times daily Historical Med      Blood Pressure KIT Disp-1 kit, R-0, PrintThis is a prescription for a blood pressure cuff for at home use. risperiDONE (RISPERDAL) 2 MG tablet Take 0.5 mg by mouth 2 times daily Historical Med      citalopram (CELEXA) 20 MG tablet Take 1 tablet by mouth daily. , Disp-30 tablet, R-5      aspirin EC 81 MG EC tablet Take 81 mg by mouth daily. With food; blood thinner             ALLERGIES     is allergic to losartan potassium and mobic [meloxicam].     FAMILY HISTORY     He indicated that his mother is alive. He indicated that his father is . He indicated that his sister is alive. He indicated that his maternal grandmother is . He indicated that the status of his maternal grandfather is unknown. He indicated that the status of his paternal grandfather is unknown. He indicated that the status of his paternal uncle is unknown.   family history includes Cancer in his maternal grandmother; Colon Cancer in his maternal grandfather, maternal grandmother, paternal grandfather, and paternal uncle; Colon Cancer (age of onset: 52) in his sister; Colon Cancer (age of onset: 68) in his father; Coronary Art Dis in his father; High Blood Pressure in his father; High Cholesterol in his father. SOCIAL HISTORY      reports that he has never smoked. He has been exposed to tobacco smoke. He has never used smokeless tobacco. He reports current alcohol use. He reports that he does not use drugs. PHYSICAL EXAM     INITIAL VITALS:  height is 6' 4\" (1.93 m) and weight is 348 lb (157.9 kg) (abnormal). His oral temperature is 98.5 °F (36.9 °C). His blood pressure is 126/93 (abnormal) and his pulse is 91. His respiration is 18 and oxygen saturation is 95%. Physical Exam  Vitals and nursing note reviewed. HENT:      Head: Normocephalic and atraumatic. Eyes:      Conjunctiva/sclera: Conjunctivae normal.   Cardiovascular:      Rate and Rhythm: Normal rate and regular rhythm. Pulses: Normal pulses. Radial pulses are 2+ on the right side and 2+ on the left side. Heart sounds: Normal heart sounds. No murmur heard. Pulmonary:      Effort: Pulmonary effort is normal. No respiratory distress. Breath sounds: Normal breath sounds. No wheezing or rhonchi. Chest:      Chest wall: No tenderness. Abdominal:      Palpations: Abdomen is soft. Tenderness: There is no abdominal tenderness. There is no guarding or rebound. Musculoskeletal:      Cervical back: Normal range of motion. Thoracic back: Bony tenderness present. No deformity or signs of trauma. Back:    Skin:     General: Skin is warm and dry. Neurological:      General: No focal deficit present. Mental Status: He is alert and oriented to person, place, and time. Psychiatric:         Mood and Affect: Mood normal.       DIFFERENTIAL DIAGNOSIS:   Differential diagnoses are discussed    DIAGNOSTIC RESULTS     EKG: All EKG's are interpreted by the Emergency Department Physician who either signs or Co-signsthis chart in the absence of a cardiologist.          RADIOLOGY: non-plain film images(s) such as CT, Ultrasound and MRI are read by the radiologist.    XR CHEST PORTABLE   Final Result   1. No acute cardiopulmonary findings. **This report has been created using voice recognition software. It may contain minor errors which are inherent in voice recognition technology. **      Final report electronically signed by Dr. Ralf Carrera on 11/30/2022 4:31 PM      CT THORACIC SPINE WO CONTRAST   Final Result   No acute process. **This report has been created using voice recognition software. It may contain minor errors which are inherent in voice recognition technology. **      Final report electronically signed by Dr. Frank Zamora on 11/30/2022 4:31 PM          LABS:    Labs Reviewed - No data to display    EMERGENCY DEPARTMENT COURSE:   Vitals:    Vitals:    11/30/22 1427 11/30/22 1706   BP: (!) 168/89 (!) 126/93   Pulse: 96 91   Resp:  18   Temp: 98.5 °F (36.9 °C)    TempSrc: Oral    SpO2: 94% 95%   Weight: (!) 348 lb (157.9 kg)    Height: 6' 4\" (1.93 m)       4:07 PM EST: The patient was seen and evaluated. Patient presents with hypertension and otherwise reassuring vital signs for complaints of back pain that radiates to the bilateral chest.  This has been occurring since a fall earlier this month.   He was seen initially and had negative x-ray of the right ribs and chest.  Pain is continued to progress since that time. He is focal tenderness at T3-4 on exam.  Also complains of some shortness of breath which is primarily only when trying to get out of bed and suspected to be secondary to pain. None currently. Chest x-ray unremarkable. CT of the thoracic spine shows no acute process. Results were discussed with the patient. He was treated with lidocaine patch in the department with some improvement in his pain. He feels comfortable with plan of discharge and outpatient follow-up. He will be discharged with prescription for lidocaine patches. Return precautions were discussed and the patient denied further needs upon discharge. CRITICAL CARE:   None    CONSULTS:  None    PROCEDURES:  None    FINAL IMPRESSION      1.  Bilateral thoracic back pain, unspecified chronicity          DISPOSITION/PLAN   Discharge    PATIENT REFERRED TO:  Kvng Burgos MD  [address]  [phone]    In 1 day  as scheduled    [facility] [address] EMERGENCY DEPT  [address] [address]  [phone]    If symptoms worsen    DISCHARGEMEDICATIONS:  Discharge Medication List as of 11/30/2022  5:00 PM        START taking these medications    Details   lidocaine (LIDODERM) 5 % Place 1 patch onto the skin daily 12 hours on, 12 hours off., Disp-15 patch, R-0Normal             (Please note that portions of this note were completedwith a voice recognition program.  Efforts were made to edit the dictations but occasionally words are mis-transcribed.)        Alysia Carrasquillo PA-C  11/30/22 2275

## 2022-12-01 ENCOUNTER — NURSE ONLY (OUTPATIENT)
Dept: LAB | Age: 57
End: 2022-12-01

## 2022-12-01 DIAGNOSIS — E87.5 HYPERKALEMIA: ICD-10-CM

## 2022-12-01 LAB — POTASSIUM SERPL-SCNC: 3.9 MEQ/L (ref 3.5–5.2)

## 2022-12-01 RX ORDER — TRAMADOL HYDROCHLORIDE 50 MG/1
50 TABLET ORAL EVERY 8 HOURS PRN
Qty: 90 TABLET | Refills: 0 | Status: SHIPPED | OUTPATIENT
Start: 2022-12-08 | End: 2023-01-07

## 2022-12-02 ENCOUNTER — TELEPHONE (OUTPATIENT)
Dept: CARDIOLOGY CLINIC | Age: 57
End: 2022-12-02

## 2022-12-02 NOTE — TELEPHONE ENCOUNTER
Patient notified and verbalized understanding. Patient asking if he is supposed to stay off Aldactone?

## 2023-01-06 DIAGNOSIS — M54.9 OTHER CHRONIC BACK PAIN: ICD-10-CM

## 2023-01-06 DIAGNOSIS — M10.071 ACUTE IDIOPATHIC GOUT OF RIGHT FOOT: ICD-10-CM

## 2023-01-06 DIAGNOSIS — M19.90 INFLAMMATORY ARTHRITIS: ICD-10-CM

## 2023-01-06 DIAGNOSIS — M46.1 SI (SACROILIAC) JOINT INFLAMMATION (HCC): ICD-10-CM

## 2023-01-06 DIAGNOSIS — M48.061 SPINAL STENOSIS OF LUMBAR REGION WITHOUT NEUROGENIC CLAUDICATION: ICD-10-CM

## 2023-01-06 DIAGNOSIS — G89.4 CHRONIC PAIN SYNDROME: ICD-10-CM

## 2023-01-06 DIAGNOSIS — G89.29 OTHER CHRONIC BACK PAIN: ICD-10-CM

## 2023-01-06 DIAGNOSIS — M47.816 SPONDYLOSIS OF LUMBAR REGION WITHOUT MYELOPATHY OR RADICULOPATHY: ICD-10-CM

## 2023-01-06 DIAGNOSIS — M48.10 DISH (DIFFUSE IDIOPATHIC SKELETAL HYPEROSTOSIS): ICD-10-CM

## 2023-01-06 RX ORDER — TRAMADOL HYDROCHLORIDE 50 MG/1
50 TABLET ORAL EVERY 8 HOURS PRN
Qty: 90 TABLET | Refills: 0 | Status: SHIPPED | OUTPATIENT
Start: 2023-01-07 | End: 2023-02-06

## 2023-01-06 NOTE — TELEPHONE ENCOUNTER
OARRS reviewed. UDS: + Tramadol, Trazodone  Present-- Cyclobenzaprine  Last seen: Visit date not found.  Follow-up:   Future Appointments   Date Time Provider Willam Lezama   2/2/2023  8:00 AM MARCO Waters CNP Med P - [place]   2/6/2023  8:20 AM MARCO Abel CNP [facility] Pain P - [place]   4/14/2023  8:30 AM Felipe Gee MD N [facility] Heart P - [place]   8/17/2023  9:30 AM MARCO Paez CNP N [facility] CHF P - [place]

## 2023-01-06 NOTE — TELEPHONE ENCOUNTER
Kaiden Lund. called requesting a refill on the following medications:  Requested Prescriptions     Pending Prescriptions Disp Refills    traMADol (ULTRAM) 50 MG tablet [Pharmacy Med Name: traMADol HCl 50 MG Oral Tablet] 90 tablet 0     Sig: TAKE 1 TABLET BY MOUTH EVERY 8 HOURS AS NEEDED FOR PAIN FOR  UP  TO  [address] verified:  .pv   [address], [address] [facility] [phone] [facility] [phone]       Date of last visit: 11/22/2022  Date of next visit (if applicable): 3/3/7471

## 2023-01-10 NOTE — DISCHARGE INSTRUCTIONS
ANESTHESIA INSTRUCTIONS FOLLOWING SURGERY        Since you may experience some intermittent light-headedness for the next several hours, we suggest you plan on bed rest or quiet relaxation this evening. You must have a friend or relative stay with you tonight. Because of the sedation you have received, it is recommended that you do not drive a motor vehicle, operate any kind of machinery, or sign any contractual agreement for 24 hours following the procedure. You should not take alcoholic beverages tonight and only take sleeping medication that has been specifically prescribed for you by your physician. Call office [phone] if you have:  Temperature greater than 100.4  Persistent nausea and vomiting  Severe uncontrolled pain  Redness, tenderness, or signs of infection (pain, swelling, redness, odor or green/yellow discharge around the site)  Difficulty breathing, headache or visual disturbances  Hives  Persistent dizziness or light-headedness  Extreme fatigue  Any other questions or concerns you may have after discharge    In an emergency, call 911 or go to an Emergency Department at a nearby hospital    It is important to bring a complete, current list of your medications to any medical appointments or hospitalizations. REMINDER:   Carry a list of your medications and allergies with you at all times  Call your pharmacy at least 1 week in advance to refill prescriptions    Diet: Resume your usual diet. Good nutrition promotes healing. Increase fluid intake. Activity: Rest for 24 hrs then resume normal activity. HOME MEDICATIONS:      If on blood thinning products such as; Aspirin, NSAIDS, Plavix, Coumadin, Xarelto, Fish Oil, Multi-Vitamins or Herbal Supplements restart in 24 hours      Restart Metformin in 48 hours if you had procedure with dye. Restart Metformin in 24 hours if no dye used during procedure.         Education Materials Received: {yes/no:672449}  Belongings Returned: {yes/no:109426}          I understand and acknowledge receipt of the above instructions. Patient or Guardian Signature                                                         Date/Time                                                                                                                                            Physician's or R.N.'s Signature                                                                  Date/Time      The discharge instructions have been reviewed with the patient and/or Guardian. Patient and/or Guardian signed and retained a printed copy. Call office [phone] if you have:  Temperature greater than 100.4  Persistent nausea and vomiting  Severe uncontrolled pain  Redness, tenderness, or signs of infection (pain, swelling, redness, odor or green/yellow discharge around the site)  Difficulty breathing, headache or visual disturbances  Hives  Persistent dizziness or light-headedness  Extreme fatigue  Any other questions or concerns you may have after discharge    In an emergency, call 911 or go to an Emergency Department at a nearby hospital    It is important to bring a complete, current list of your medications to any medical appointments or hospitalizations. REMINDER:   Carry a list of your medications and allergies with you at all times  Call your pharmacy at least 1 week in advance to refill prescriptions    Diet: Resume your usual diet. Good nutrition promotes healing. Increase fluid intake. Activity: Rest for 24 hrs then resume normal activity. Education Materials Received: {yes/no:058157}  Belongings Returned: {yes/no:471934}          I understand and acknowledge receipt of the above instructions. Patient or Guardian Signature                                                         Date/Time                                                                                                                                            Physician's or R.N.'s Signature                                                                  Date/Time      The discharge instructions have been reviewed with the patient and/or Guardian. Patient and/or Guardian signed and retained a printed copy.

## 2023-01-11 ENCOUNTER — ANESTHESIA EVENT (OUTPATIENT)
Dept: OPERATING ROOM | Age: 58
End: 2023-01-11
Payer: MEDICARE

## 2023-01-11 ENCOUNTER — ANESTHESIA (OUTPATIENT)
Dept: OPERATING ROOM | Age: 58
End: 2023-01-11
Payer: MEDICARE

## 2023-01-11 ENCOUNTER — APPOINTMENT (OUTPATIENT)
Dept: GENERAL RADIOLOGY | Age: 58
End: 2023-01-11
Attending: PAIN MEDICINE
Payer: MEDICARE

## 2023-01-11 ENCOUNTER — HOSPITAL ENCOUNTER (OUTPATIENT)
Age: 58
Setting detail: OUTPATIENT SURGERY
Discharge: HOME | End: 2023-01-11
Attending: PAIN MEDICINE | Admitting: PAIN MEDICINE
Payer: MEDICARE

## 2023-01-11 VITALS
RESPIRATION RATE: 16 BRPM | HEIGHT: 76 IN | DIASTOLIC BLOOD PRESSURE: 75 MMHG | SYSTOLIC BLOOD PRESSURE: 125 MMHG | TEMPERATURE: 97.6 F | HEART RATE: 81 BPM | OXYGEN SATURATION: 95 % | BODY MASS INDEX: 38.36 KG/M2 | WEIGHT: 315 LBS

## 2023-01-11 LAB — GLUCOSE BLD-MCNC: 95 MG/DL (ref 70–108)

## 2023-01-11 PROCEDURE — 2709999900 HC NON-CHARGEABLE SUPPLY: Performed by: PAIN MEDICINE

## 2023-01-11 PROCEDURE — 2500000003 HC RX 250 WO HCPCS: Performed by: NURSE ANESTHETIST, CERTIFIED REGISTERED

## 2023-01-11 PROCEDURE — 2500000003 HC RX 250 WO HCPCS: Performed by: PAIN MEDICINE

## 2023-01-11 PROCEDURE — 7100000010 HC PHASE II RECOVERY - FIRST 15 MIN: Performed by: PAIN MEDICINE

## 2023-01-11 PROCEDURE — 3209999900 FLUORO FOR SURGICAL PROCEDURES

## 2023-01-11 PROCEDURE — 3600000056 HC PAIN LEVEL 4 BASE: Performed by: PAIN MEDICINE

## 2023-01-11 PROCEDURE — 64493 INJ PARAVERT F JNT L/S 1 LEV: CPT | Performed by: PAIN MEDICINE

## 2023-01-11 PROCEDURE — 82948 REAGENT STRIP/BLOOD GLUCOSE: CPT

## 2023-01-11 PROCEDURE — 64494 INJ PARAVERT F JNT L/S 2 LEV: CPT | Performed by: PAIN MEDICINE

## 2023-01-11 PROCEDURE — 6360000002 HC RX W HCPCS: Performed by: NURSE ANESTHETIST, CERTIFIED REGISTERED

## 2023-01-11 PROCEDURE — 3700000000 HC ANESTHESIA ATTENDED CARE: Performed by: PAIN MEDICINE

## 2023-01-11 PROCEDURE — 6360000002 HC RX W HCPCS: Performed by: PAIN MEDICINE

## 2023-01-11 RX ORDER — BUPIVACAINE HYDROCHLORIDE 2.5 MG/ML
INJECTION, SOLUTION EPIDURAL; INFILTRATION; INTRACAUDAL PRN
Status: DISCONTINUED | OUTPATIENT
Start: 2023-01-11 | End: 2023-01-11 | Stop reason: ALTCHOICE

## 2023-01-11 RX ORDER — PROPOFOL 10 MG/ML
INJECTION, EMULSION INTRAVENOUS PRN
Status: DISCONTINUED | OUTPATIENT
Start: 2023-01-11 | End: 2023-01-11 | Stop reason: SDUPTHER

## 2023-01-11 RX ORDER — LIDOCAINE HYDROCHLORIDE 10 MG/ML
INJECTION, SOLUTION INFILTRATION; PERINEURAL PRN
Status: DISCONTINUED | OUTPATIENT
Start: 2023-01-11 | End: 2023-01-11 | Stop reason: SDUPTHER

## 2023-01-11 RX ORDER — METHYLPREDNISOLONE ACETATE 80 MG/ML
INJECTION, SUSPENSION INTRA-ARTICULAR; INTRALESIONAL; INTRAMUSCULAR; SOFT TISSUE PRN
Status: DISCONTINUED | OUTPATIENT
Start: 2023-01-11 | End: 2023-01-11 | Stop reason: ALTCHOICE

## 2023-01-11 RX ORDER — LIDOCAINE HYDROCHLORIDE 10 MG/ML
INJECTION, SOLUTION INFILTRATION; PERINEURAL PRN
Status: DISCONTINUED | OUTPATIENT
Start: 2023-01-11 | End: 2023-01-11 | Stop reason: ALTCHOICE

## 2023-01-11 RX ADMIN — PROPOFOL 50 MG: 10 INJECTION, EMULSION INTRAVENOUS at 12:41

## 2023-01-11 RX ADMIN — LIDOCAINE HYDROCHLORIDE 50 MG: 10 INJECTION, SOLUTION INFILTRATION; PERINEURAL at 12:34

## 2023-01-11 RX ADMIN — PROPOFOL 50 MG: 10 INJECTION, EMULSION INTRAVENOUS at 12:34

## 2023-01-11 ASSESSMENT — PAIN SCALES - GENERAL: PAINLEVEL_OUTOF10: 0

## 2023-01-11 ASSESSMENT — PAIN - FUNCTIONAL ASSESSMENT: PAIN_FUNCTIONAL_ASSESSMENT: 0-10

## 2023-01-11 NOTE — H&P
H&P    Patient had Lumbar MBB L4-5,5-S1 bilateral #1 on 11/7/2022 states he received about 80% pain relief or benefit for 1 week then he fell and the pain came back and was worse for a bit. He is back to baseline now. He follows Dr Sunita Eid. I referred for elevated screen labs. He complains of any ER visits or new health issues since last visit. muscle strain  chest wall from a fall      HPI  New pt here with c/o low back pain. He has had some falls in the past  denies nay other injures that could have  inured his back SI or hips. Randee Ventura on ice  Feb 2022 but has  had low back pain for many years. He has worked mostly [address] work. He is on disability for mental health,  heart issues and chronic pain. He has went [facility] in past  2013- 2017  He states they tried 1 injection in  the past and the needle bent   He c/o low back pain  mostly all axial facet mediated, no radicular s/s into BLE. He has back stiffness with spasms. He has limited ROM  with bending turning  increases the pain. He has LLE weakness at times with long term standing walking. He has had hip  and knee issues in the past also and followed [facility]. He had bursa hip injections  and knee steroid injections at [facility] with  mild to moderate relief. Patient pain increases with bending, lifting, twisting , turning torso, walking, standing, stairs, getting up and down, and housework or working at job. Treatments tried PT/HEP, ICE/HEAT, Chiropractor, NSAIDS, narcotics, muscle relaxer, and OTC rubs creams patches Lyrica 2012  Pain description sharp and aching  Pain rating  scale 1-10 highest  7  lowest  2  average   4  Alleviating Factors: rest Tramadol heat pad    Any leg weakness, saddle paresthesia, bowel or bladder incontinence yes or no? LLE weakness with long term standing.  walking      Any prior spine or ortho surgeon consult and with whom Yes Dr Monty Burgess many years ago , he recommended back surgery pt declined         Radiology:              Pain scale with out pain medications or at its worst is 9/10. Pain scale with pain medications or at its best is 5/10. Last dose of Tramadol  was today  Drug screen reviewed from 9/2022 and was appropriate  Pill count completed  today and WNL: Yes                     The patientis allergic to losartan potassium and mobic [meloxicam]. Subjective:      Review of Systems   Constitutional:  Positive for activity change. Negative for appetite change, chills, diaphoresis, fatigue, fever and unexpected weight change. HENT:  Negative for congestion, ear pain, hearing loss, mouth sores, nosebleeds, rhinorrhea, sinus pressure and sore throat. Eyes:  Negative for photophobia, pain and visual disturbance. Respiratory:  Negative for cough, chest tightness, shortness of breath and wheezing. KRISTEN   Cardiovascular:  Negative for chest pain and palpitations. HTN CHF MI 2008   Gastrointestinal:  Negative for abdominal pain, constipation, diarrhea, nausea and vomiting. Endocrine: Negative for cold intolerance, heat intolerance, polydipsia, polyphagia and polyuria. DM   Genitourinary:  Negative for decreased urine volume, difficulty urinating, frequency and hematuria. Musculoskeletal:  Positive for arthralgias, back pain, gait problem, joint swelling and myalgias. Negative for neck pain and neck stiffness. GOUT DISH   Skin:  Negative for color change and rash. Allergic/Immunologic: Negative for food allergies and immunocompromised state. Neurological:  Negative for dizziness, tremors, seizures, syncope, facial asymmetry, speech difficulty, weakness, light-headedness, numbness and headaches. Hematological:  Does not bruise/bleed easily. Psychiatric/Behavioral:  Negative for agitation, behavioral problems, confusion, decreased concentration, dysphoric mood, hallucinations, self-injury, sleep disturbance and suicidal ideas.  The patient is nervous/anxious. The patient is not hyperactive. Anxiety depression      Objective:      Vitals       Vitals:     11/22/22 0821   BP: 134/84   Site: Left Upper Arm   Position: Sitting   Cuff Size: Large Adult   Pulse: 70   Weight: (!) 348 lb (157.9 kg)   Height: 6' 4\" (1.93 m)            Physical Exam  Vitals and nursing note reviewed. Constitutional:       General: He is not in acute distress. Appearance: He is well-developed. He is not diaphoretic. HENT:      Head: Normocephalic and atraumatic. Right Ear: External ear normal.      Left Ear: External ear normal.      Nose: Nose normal.      Mouth/Throat:      Pharynx: No oropharyngeal exudate. Eyes:      General: No scleral icterus. Right eye: No discharge. Left eye: No discharge. Conjunctiva/sclera: Conjunctivae normal.      Pupils: Pupils are equal, round, and reactive to light. Neck:      Thyroid: No thyromegaly. Cardiovascular:      Rate and Rhythm: Normal rate and regular rhythm. Heart sounds: Normal heart sounds. No murmur heard. No friction rub. No gallop. Pulmonary:      Effort: Pulmonary effort is normal. No respiratory distress. Breath sounds: Normal breath sounds. No wheezing or rales. Chest:      Chest wall: No tenderness. Abdominal:      General: Bowel sounds are normal. There is no distension. Palpations: Abdomen is soft. Tenderness: There is no abdominal tenderness. There is no guarding or rebound. Musculoskeletal:      Cervical back: Full passive range of motion without pain, normal range of motion and neck supple. No edema, erythema or rigidity. No muscular tenderness. Normal range of motion. Thoracic back: Spasms, tenderness and bony tenderness present. Lumbar back: Spasms, tenderness and bony tenderness present. Decreased range of motion. Back:     Right hip: Bony tenderness present. Left hip: Bony tenderness present. Right knee:  Bony tenderness present. Left knee: Bony tenderness present. Skin:     General: Skin is warm. Coloration: Skin is not pale. Findings: No erythema or rash. Neurological:      Mental Status: He is alert and oriented to person, place, and time. He is not disoriented. Cranial Nerves: No cranial nerve deficit. Sensory: No sensory deficit. Motor: Weakness present. No atrophy or abnormal muscle tone. Coordination: Coordination normal.      Gait: Gait abnormal.      Deep Tendon Reflexes: Reflexes are normal and symmetric. Babinski sign absent on the right side. Reflex Scores:       Tricep reflexes are 2+ on the right side and 2+ on the left side. Bicep reflexes are 2+ on the right side and 2+ on the left side. Brachioradialis reflexes are 2+ on the right side and 2+ on the left side. Patellar reflexes are 2+ on the right side and 2+ on the left side. Achilles reflexes are 2+ on the right side and 2+ on the left side. Comments: LLE is shorter than right    Motor 5/5 BUE BLE    Psychiatric:         Attention and Perception: He is attentive. Mood and Affect: Mood is not anxious or depressed. Affect is not labile, blunt, angry or inappropriate. Speech: He is communicative. Speech is not rapid and pressured, delayed, slurred or tangential.         Behavior: Behavior is not agitated, slowed, aggressive, withdrawn, hyperactive or combative. Thought Content: Thought content is not paranoid or delusional. Thought content does not include homicidal or suicidal ideation. Thought content does not include homicidal or suicidal plan. Cognition and Memory: Memory is not impaired. He does not exhibit impaired recent memory or impaired remote memory. Judgment: Judgment is not impulsive or inappropriate. BECCA  Patricks test  positive  Yeoman's  or Gaenslen's positive  Kemps  positive     Assessment:      1.  Spondylosis of lumbar region without myelopathy or radiculopathy    2. DISH (diffuse idiopathic skeletal hyperostosis)    3. Spinal stenosis of lumbar region without neurogenic claudication    4. SI (sacroiliac) joint inflammation (HCC)    5. Chronic pain syndrome    6. Inflammatory arthritis    7. Acute idiopathic gout of right foot       Plan:      OARRS reviewed. Current MED: 15  Patient was not offered naloxone for home. Testing Labs or Radiology reviewed: Lumbar   Procedures:Lumbar facet MBB #2 @ L4-5,5-S1 bilateral   Discussed with patient about risks with procedure including infection, reaction to medication, increased pain, or bleeding. Medications:Tramadol   If patient is on blood thinners will need approval to hold yes or no:ASA per Dr Saira Torres   Does patient have implanted devices? Stimulators, AICD or Pacemaker:N/A               Return for Lumbar Facet MBB @ L4-5, 5-S1 bilateral #2, Follow up after procedure.

## 2023-01-11 NOTE — ANESTHESIA POSTPROCEDURE EVALUATION
Department of Anesthesiology  Postprocedure Note    Patient: Justin Boyer MRN: [id number]  YOB: 1965  Date of evaluation: 1/11/2023      Procedure Summary     Date: 01/11/23 Room / Location: [address] [place] [address] 03 / 138 [facility]    Anesthesia Start: 4639 Anesthesia Stop: 0411    Procedure: Lumbar Facet Medial Branch Block Lumbar 4-5, 5-S1 bilateral (Bilateral: Back) Diagnosis:       Spondylosis of lumbar region without myelopathy or radiculopathy      (Spondylosis of lumbar region without myelopathy or radiculopathy [M47.816])    Surgeons: Sagar Alford MD Responsible Provider: Aaron Gale MD    Anesthesia Type: MAC ASA Status: 3          Anesthesia Type: MAC    Driss Phase I:      Driss Phase II: Driss Score: 10      Anesthesia Post Evaluation    Patient location during evaluation: bedside  Patient participation: complete - patient cannot participate  Level of consciousness: awake and alert  Airway patency: patent  Nausea & Vomiting: no nausea and no vomiting  Complications: no  Cardiovascular status: hemodynamically stable  Respiratory status: acceptable  Hydration status: euvolemic      [address]  POST-ANESTHESIA NOTE       Name:  Justin Boyer Age:  62 y.o.   MRN:  [id number]      Last Vitals:  /75   Pulse 81   Temp 97.6 °F (36.4 °C) (Temporal)   Resp 16   Ht 6' 4\" (1.93 m)   Wt (!) 348 lb 6.4 oz (158 kg)   SpO2 95%   BMI 42.41 kg/m²   Patient Vitals for the past 4 hrs:   BP Temp Temp src Pulse Resp SpO2 Height Weight   01/11/23 1247 125/75 97.6 °F (36.4 °C) Temporal 81 16 95 % -- --   01/11/23 1142 137/87 97.5 °F (36.4 °C) Temporal 77 16 96 % 6' 4\" (1.93 m) (!) 348 lb 6.4 oz (158 kg)       Level of Consciousness:  Awake    Respiratory:  Stable    Oxygen Saturation:  Stable    Cardiovascular:  Stable    Hydration:  Adequate    PONV:  Stable    Post-op Pain:  Adequate analgesia    Post-op Assessment:  No apparent anesthetic complications    Additional Follow-Up / Treatment / Comment:  Ryan Chambers MD  January 11, 2023   1:08 PM

## 2023-01-11 NOTE — H&P
[address] [address]  History and Physical Update    Pt Name: Sari Palmer. MRN: [id number]  YOB: 1965  Date of evaluation: 1/11/2023      I have examined the patient and reviewed the H&P/Consult and there are no changes to the patient or plans.         Electronically signed by Jeni Rubinstein, MD on 1/11/2023 at 12:31 PM

## 2023-01-11 NOTE — ANESTHESIA PRE PROCEDURE
Department of Anesthesiology  Preprocedure Note       Name:  Harika Burrows. Age:  62 y.o.  :  1965                                          MRN:  [id number]         Date:  2023      Surgeon: Lizbet Diamond):  Nba Fenton MD    Procedure: Procedure(s):  Lumbar Facet Medial Branch Block Lumbar 4-5, 5-S1 bilateral    Medications prior to admission:   Prior to Admission medications    Medication Sig Start Date End Date Taking? Authorizing Provider   traMADol (ULTRAM) 50 MG tablet Take 1 tablet by mouth every 8 hours as needed for Pain for up to 30 days. Max Daily Amount: 150 mg 23  MARCO Brewer CNP   naproxen (NAPROSYN) 500 MG tablet Take 1 tablet by mouth 2 times daily (with meals) for 30 doses 11/30/22 12/15/22  Stephon Concepcion PA-C   tiZANidine (ZANAFLEX) 4 MG tablet Take 1 tablet by mouth every 6 hours as needed (muscle spasm) 22   Stephon Concepcion PA-C   lidocaine (LIDODERM) 5 % Place 1 patch onto the skin daily 12 hours on, 12 hours off. 22   Stephon Concepcion PA-C   spironolactone (ALDACTONE) 25 MG tablet Take 25 mg by mouth daily  Patient not taking: Reported on 2023    Historical Provider, MD   raNITIdine HCl (RANITIDINE 150 MAX STRENGTH PO) Take 150 mg by mouth daily  Patient not taking: Reported on 2023    Historical Provider, MD   dapagliflozin (FARXIGA) 10 MG tablet Take 1 tablet by mouth every morning 22   MARCO Franco CNP   bisacodyl (DULCOLAX) 5 MG EC tablet See Prep Instructions 22   MARCO Peña CNP   polyethylene glycol (GLYCOLAX) 17 GM/SCOOP powder Dispense 238 Gram Bottle.   Use as Directed 22   MARCO Peña CNP   albuterol sulfate HFA (VENTOLIN HFA) 108 (90 Base) MCG/ACT inhaler Inhale 2 puffs into the lungs 4 times daily as needed for Wheezing 2/10/22   MARCO Wilson CNP   traZODone (DESYREL) 50 MG tablet Take 50 mg by mouth nightly    Historical Provider, MD umeclidinium-vilanterol (ANORO ELLIPTA) 62.5-25 MCG/INH AEPB inhaler Inhale 1 puff into the lungs daily    Historical Provider, MD   triamcinolone (KENALOG) 0.1 % cream Apply topically 2 times daily Apply topically 2 times daily. Historical Provider, MD   CPAP Machine MISC Please provide a new CPAP machine with pressure of 10. The new CPAP machine must have capabilities to give down load report regarding >4hour compliance and residual AHI. PAP must have auto capability and detailed download capability 2/1/22   MARCO Turner - CNP   lisinopril (PRINIVIL;ZESTRIL) 10 MG tablet TAKE 1 TABLET BY MOUTH ONCE DAILY 8/9/21   Historical Provider, MD   Blood Pressure Monitoring (BLOOD PRESSURE MONITOR AUTOMAT) GENET 1 applicator by Does not apply route daily 10/7/21   MARCO Stoddard - CNP   vitamin C (VITAMIN C) 500 MG tablet Take 1 tablet by mouth daily 11/29/20   Vidya Camacho MD   dicyclomine (BENTYL) 10 MG capsule Take 1 capsule by mouth every 6 hours as needed (cramps) 12/1/19   Tai Hubbard PA-C   ondansetron (ZOFRAN ODT) 4 MG disintegrating tablet Take 1 tablet by mouth every 8 hours as needed for Nausea 12/1/19   Tai Hubbard PA-C   acetaminophen (APAP EXTRA STRENGTH) 500 MG tablet Take 1 tablet by mouth every 6 hours as needed for Pain 10/23/19   Tai Hubbard PA-C   metFORMIN (GLUCOPHAGE-XR) 500 MG extended release tablet Take 500 mg by mouth daily (with breakfast)    Historical Provider, MD   allopurinol (ZYLOPRIM) 300 MG tablet Take 1 tablet by mouth daily 9/26/16   Yaw Connell MD   atorvastatin (LIPITOR) 20 MG tablet TAKE 1 TABLET BY MOUTH ONE TIME A DAY 9/26/16   Yaw Connell MD   bumetanide (BUMEX) 0.5 MG tablet Take 2 tablets daily. If weight gain 3Ibs in 1 day or 5Ibs in 1 week, take 2 tablets in AM and PM as needed.  9/26/16   Yaw Connell MD   carvedilol (COREG) 25 MG tablet Take 1 tablet by mouth 2 times daily 9/26/16   Liban Pryor Alicia Gunter MD   meclizine (ANTIVERT) 25 MG tablet Take 1 tablet by mouth 3 times daily as needed 7/5/16   Elinor Garcia MD   carBAMazepine (TEGRETOL) 200 MG tablet Take 200 mg by mouth 2 times daily     Historical Provider, MD   Blood Pressure KIT This is a prescription for a blood pressure cuff for at home use. 9/17/15   Elinor Garcia MD   risperiDONE (RISPERDAL) 2 MG tablet Take 0.5 mg by mouth 2 times daily     Historical Provider, MD   citalopram (CELEXA) 20 MG tablet Take 1 tablet by mouth daily. 2/11/13   Elinor Garcia MD   aspirin EC 81 MG EC tablet Take 81 mg by mouth daily. With food; blood thinner    Historical Provider, MD       Current medications:    No current outpatient medications on file. No current facility-administered medications for this visit. Allergies: Allergies   Allergen Reactions    Losartan Potassium     Mobic [Meloxicam]        Problem List:    Patient Active Problem List   Diagnosis Code    Depression F32. A    Vertigo R42    Chronic pain syndrome G89.4    CHF (congestive heart failure) ([facility]) I50.9    Morbid obesity ([facility]) E66.01    Gout M10.9    Hyperlipidemia E78.5    Arrhythmia I49.9    Anxiety F41.9    Panic attacks F41.0    Fatigue R53.83    Swelling R60.9    Bronchitis J40    Dyspnea R06.00    Cardiomyopathy, nonischemic ([facility]) I42.8    Hypercapnia R06.89    Bronchospasm J98.01    Spondylolysis M43.00    Anterolisthesis M43.10    Constipation, chronic K59.09    Lipoma of back D17.1    COPD (chronic obstructive pulmonary disease) ([facility]) J44.9    Chest pain R07.9    Acute respiratory failure with hypoxia and hypercarbia ([facility]) J96.01, J96.02    Shortness of breath R06.02    Acute on chronic respiratory failure with hypoxia and hypercapnia ([facility]) J96.21, J96.22    Cor pulmonale, chronic ([facility]) I27.81    Acute on chronic diastolic congestive heart failure ([facility]) I50.33    Hypokalemia E87.6    Hypernatremia E87.0    Essential hypertension I10    Chronic obstructive pulmonary disease with acute exacerbation ([facility]) J44.1    Coronary artery disease involving native coronary artery of native heart without angina pectoris I25.10    Chronic back pain M54.9, G89.29    Acute respiratory acidosis ([facility]) J96.02    Ventricular tachycardia I47.20    Congestive heart failure, NYHA class 3 ([facility]) I50.9    KRISTEN on CPAP G47.33, Z99.89    Chronic combined systolic and diastolic heart failure ([facility]) I50.42    COVID-19 U07.1    Lumbar spondylosis M47.816       Past Medical History:        Diagnosis Date    Anxiety     Arrhythmia     Arthritis     CAD (coronary artery disease)     Chest pain     HX OF:    CHF (congestive heart failure) ([facility])     Chronic back pain     COPD (chronic obstructive pulmonary disease) ([facility])     Depression     Diverticula of colon     Fatigue     Heart attack ([facility])     Hyperlipidemia     Hypertension     Panic attacks     Pneumonia     Sleep apnea     SOB (shortness of breath)     Spondylosis     sponylolisthesis L5    Swelling        Past Surgical History:        Procedure Laterality Date    CARDIAC CATHETERIZATION  01/30/2008    EF 20%    CARDIOVASCULAR STRESS TEST  02/16/2009    EF 52%    COLONOSCOPY      PAIN MANAGEMENT PROCEDURE Bilateral 11/7/2022    Lumbar Facet Medial Branch Block Lumbar 4-5,5-Sacral 1 bilateral performed by Dan Kehr, MD at [facility] / [address] [place] [address]  5/17/12    mole on abd-left arm-Dr. Elissa Patricio    TRANSTHORACIC ECHOCARDIOGRAM  07/22/2011    EF 55-65%       Social History:    Social History     Tobacco Use    Smoking status: Never     Passive exposure: Yes    Smokeless tobacco: Never   Substance Use Topics    Alcohol use: Yes     Alcohol/week: 0.0 standard drinks     Comment: beer                                 Counseling given: Not Answered      Vital Signs (Current):    There were no vitals filed for this visit.                                            BP Readings from Last 3 Encounters:   01/11/23 137/87   11/30/22 (!) 126/93   11/22/22 134/84       NPO Status:                                                                                 BMI:   Wt Readings from Last 3 Encounters:   01/11/23 (!) 348 lb 6.4 oz (158 kg)   11/30/22 (!) 348 lb (157.9 kg)   11/22/22 (!) 348 lb (157.9 kg)     There is no height or weight on file to calculate BMI.    CBC:   Lab Results   Component Value Date/Time    WBC 5.8 01/28/2022 08:14 AM    RBC 5.14 01/28/2022 08:14 AM    RBC 4.76 09/14/2011 08:17 AM    HGB 14.5 01/28/2022 08:14 AM    HCT 46.4 01/28/2022 08:14 AM    MCV 90.3 01/28/2022 08:14 AM    RDW 16.4 06/10/2018 01:35 AM     01/28/2022 08:14 AM       CMP:   Lab Results   Component Value Date/Time     01/11/2023 10:17 AM    K 4.7 01/11/2023 10:17 AM    K 4.8 11/28/2020 04:24 AM     01/11/2023 10:17 AM    CO2 29 01/11/2023 10:17 AM    BUN 14 01/11/2023 10:17 AM    CREATININE 0.9 01/11/2023 10:17 AM    LABGLOM >60 01/11/2023 10:17 AM    GLUCOSE 94 01/11/2023 10:17 AM    GLUCOSE 107 05/16/2012 06:20 AM    PROT 7.0 01/11/2023 10:17 AM    CALCIUM 8.8 01/11/2023 10:17 AM    BILITOT 0.7 01/11/2023 10:17 AM    ALKPHOS 89 01/11/2023 10:17 AM    AST 22 01/11/2023 10:17 AM    ALT 31 01/11/2023 10:17 AM       POC Tests:   Recent Labs     01/11/23  1142   POCGLU 95       Coags:   Lab Results   Component Value Date/Time    INR 1.02 11/28/2020 04:24 AM    APTT 32.8 11/28/2020 04:24 AM       HCG (If Applicable): No results found for: PREGTESTUR, PREGSERUM, HCG, HCGQUANT     ABGs: No results found for: PHART, PO2ART, FBG1OYZ, WOK3CWH, BEART, [id number]     Type & Screen (If Applicable):  Lab Results   Component Value Date    LABRH POS 11/27/2020       Drug/Infectious Status (If Applicable):  No results found for: HIV, HEPCAB    COVID-19 Screening (If Applicable):   Lab Results   Component Value Date/Time COVID19 NOT DETECTED 02/10/2022 08:25 AM           Anesthesia Evaluation  Patient summary reviewed no history of anesthetic complications:   Airway: Mallampati: II  TM distance: >3 FB   Neck ROM: full  Mouth opening: > = 3 FB   Dental:          Pulmonary:   (+) COPD:  sleep apnea:  decreased breath sounds                            Cardiovascular:    (+) hypertension:, CAD:, CHF:,         Rhythm: regular                      Neuro/Psych:   (+) psychiatric history:            GI/Hepatic/Renal:   (+) morbid obesity          Endo/Other:                     Abdominal:   (+) obese,           Vascular: Other Findings:             Anesthesia Plan      MAC     ASA 3       Induction: intravenous. MIPS: prophylactic pharmacologic antiemetic agents not administered perioperatively for documented reasons. Anesthetic plan and risks discussed with patient.       Plan discussed with CRNA and surgical team.                    Maria De Jesus Fox MD   1/11/2023

## 2023-01-11 NOTE — PROGRESS NOTES
1247: Patient to Phase II Recovery via bed in stable condition on room air. Denies any pain, nausea, or shortness of breath. 1250: Patient resting and eating snack at this time. 1254: IV removed. No complications. 1257: Patient getting dressed. 1301: Patient discharged at this time.

## 2023-01-11 NOTE — OP NOTE
Pre-Procedure Note    Patient Name: Sisi Orourke YOB: 1965  Room/Bed: [facility]/NONE  Medical Record Number: [id number]  Date: 1/11/23      Indication:  Lower back pain    Consent: On file. Vital Signs:   Vitals:    01/11/23 1142   BP: 137/87   Pulse: 77   Resp: 16   Temp: 97.5 °F (36.4 °C)   SpO2: 96%       Pre-Sedation Documentation and Exam:   Vital signs have been reviewed (see flow sheet for vitals). Sedation/ Anesthesia Plan:   MAC    Patient is an appropriate candidate for plan of sedation: yes    Preoperative Diagnosis:   L-spondylosis     Postoperative Diagnosis:   as above     Procedure Performed:  Diagnostic/Confirmatory median branch blocks at the levels of L4-5 and L5-S1 bilateral under fluoroscopic guidance #2. Indication for the Procedure: The patient has a history of chronic low back pain that is not responding well to the conservative treatment. The patient's pain is mostly axial in nature. Pain is interfering with the activities of daily living. Physical examination revealed facet tenderness and facet loading is positive. The procedure and risks  were discussed with the patient and an informed consent was obtained. Procedure:      Patient is placed in prone position and skin over  the back was prepped and draped in sterile manner. Then using fluoroscopy the junction of the transverse process of the vertebra with the superior process of the facet joint was observed and the view was optimized. The skin and deep tissues posterior were infiltrated with  20  ml of 1% lidocaine. Then a #22-gauge 5-1/2 inch spinal needle was introduced through the skin wheal under fluoroscopy guidance such that the tip of the needle lies at the junction of the transverse process L3/L4/L5 with the superior processes of the facet joint. . Then after negative aspiration a total of 12 ml of 0.25% Marcaine and depomedrol (total 80 mg) was injected through the needles.     EBL-0    For L5 Median branch block the junction of the ala of  the sacrum with the superior articular process of the facet joint was taken as a reference point and L4 median branch the junction of the transverse process the L5 with the superolateral possible facet joint was taken to the point and healthy median branch the junction of the transverse process of L4 with the superior lateral process of the facet joint was taken as a reference point. For S1 median branch the most lateral and superior aspect of S1 foramina was taken as a reference point. Patient's vital signs and neurological status remained stable through  the procedure and post procedural  Period. Patient was instructed to keep track of pain for next 24 hours. every hour and bring it with next visit. Patient tollerated the procedure well and was discharged home in stable condition.         Electronically signed by Barbara Bobby MD on 1/11/23 at 12:36 PM EST

## (undated) DEVICE — GLOVE ORANGE PI 7 1/2   MSG9075

## (undated) DEVICE — HYPODERMIC SAFETY NEEDLE: Brand: MAGELLAN

## (undated) DEVICE — NEEDLE SPNL 22GA L3.5IN BLK HUB S STL REG WALL FIT STYL W/

## (undated) DEVICE — GAUZE SPONGES,USP TYPE VII GAUZE, 12 PLY: Brand: CURITY

## (undated) DEVICE — CHLORAPREP 26ML CLEAR

## (undated) DEVICE — SYRINGE MED 10ML LUERLOCK TIP W/O SFTY DISP

## (undated) DEVICE — TOWEL,OR,DSP,ST,BLUE,STD,4/PK,20PK/CS: Brand: MEDLINE

## (undated) DEVICE — NEEDLE SYR 18GA L1.5IN RED PLAS HUB S STL BLNT FILL W/O

## (undated) DEVICE — SHEET,DRAPE,3/4,53X77,STERILE: Brand: MEDLINE